# Patient Record
Sex: MALE | Race: WHITE | Employment: FULL TIME | URBAN - NONMETROPOLITAN AREA
[De-identification: names, ages, dates, MRNs, and addresses within clinical notes are randomized per-mention and may not be internally consistent; named-entity substitution may affect disease eponyms.]

---

## 2017-01-05 ENCOUNTER — HOSPITAL ENCOUNTER (INPATIENT)
Age: 52
LOS: 4 days | Discharge: HOME | DRG: 885 | End: 2017-01-09
Attending: EMERGENCY MEDICINE | Admitting: PSYCHIATRY & NEUROLOGY
Payer: COMMERCIAL

## 2017-01-05 DIAGNOSIS — R45.851 DEPRESSION WITH SUICIDAL IDEATION: Primary | ICD-10-CM

## 2017-01-05 DIAGNOSIS — F32.A DEPRESSION WITH SUICIDAL IDEATION: Primary | ICD-10-CM

## 2017-01-05 LAB
ACETAMINOPHEN LEVEL: <15 UG/ML
ALBUMIN SERPL-MCNC: 5.2 G/DL (ref 3.5–5.2)
ALP BLD-CCNC: 108 U/L (ref 40–130)
ALT SERPL-CCNC: 14 U/L (ref 5–41)
AMPHETAMINE SCREEN, URINE: NEGATIVE
ANION GAP SERPL CALCULATED.3IONS-SCNC: 16 MMOL/L (ref 7–19)
AST SERPL-CCNC: 20 U/L (ref 5–40)
BARBITURATE SCREEN URINE: NEGATIVE
BENZODIAZEPINE SCREEN, URINE: POSITIVE
BILIRUB SERPL-MCNC: 0.3 MG/DL (ref 0.2–1.2)
BILIRUBIN URINE: NEGATIVE
BLOOD, URINE: NEGATIVE
BUN BLDV-MCNC: 11 MG/DL (ref 6–20)
CALCIUM SERPL-MCNC: 10.3 MG/DL (ref 8.6–10)
CANNABINOID SCREEN URINE: NEGATIVE
CHLORIDE BLD-SCNC: 97 MMOL/L (ref 98–111)
CLARITY: ABNORMAL
CO2: 30 MMOL/L (ref 22–29)
COCAINE METABOLITE SCREEN URINE: NEGATIVE
COLOR: YELLOW
CREAT SERPL-MCNC: 1 MG/DL (ref 0.5–1.2)
ETHANOL: <10 MG/DL (ref 0–0.08)
GFR NON-AFRICAN AMERICAN: >60
GLOBULIN: 2.4 G/DL
GLUCOSE BLD-MCNC: 102 MG/DL (ref 74–109)
GLUCOSE URINE: NEGATIVE MG/DL
HCT VFR BLD CALC: 46.3 % (ref 42–52)
HEMOGLOBIN: 15.6 G/DL (ref 14–18)
KETONES, URINE: 15 MG/DL
LEUKOCYTE ESTERASE, URINE: NEGATIVE
Lab: ABNORMAL
MCH RBC QN AUTO: 30.8 PG (ref 27–31)
MCHC RBC AUTO-ENTMCNC: 33.7 G/DL (ref 33–37)
MCV RBC AUTO: 91.3 FL (ref 80–94)
NITRITE, URINE: NEGATIVE
OPIATE SCREEN URINE: NEGATIVE
PDW BLD-RTO: 13.2 % (ref 11.5–14.5)
PH UA: 7
PLATELET # BLD: 223 K/UL (ref 130–400)
PMV BLD AUTO: 11.4 FL (ref 7.4–10.4)
POTASSIUM SERPL-SCNC: 4.3 MMOL/L (ref 3.5–5)
PROTEIN UA: NEGATIVE MG/DL
RBC # BLD: 5.07 M/UL (ref 4.7–6.1)
SALICYLATE, SERUM: <3 MG/DL (ref 3–10)
SODIUM BLD-SCNC: 143 MMOL/L (ref 136–145)
SPECIFIC GRAVITY UA: 1.02
TOTAL PROTEIN: 7.6 G/DL (ref 6.6–8.7)
UROBILINOGEN, URINE: 0.2 E.U./DL
WBC # BLD: 7.8 K/UL (ref 4.8–10.8)

## 2017-01-05 PROCEDURE — G0480 DRUG TEST DEF 1-7 CLASSES: HCPCS

## 2017-01-05 PROCEDURE — 85027 COMPLETE CBC AUTOMATED: CPT

## 2017-01-05 PROCEDURE — 1240000000 HC EMOTIONAL WELLNESS R&B

## 2017-01-05 PROCEDURE — 36415 COLL VENOUS BLD VENIPUNCTURE: CPT

## 2017-01-05 PROCEDURE — 80307 DRUG TEST PRSMV CHEM ANLYZR: CPT

## 2017-01-05 PROCEDURE — 81003 URINALYSIS AUTO W/O SCOPE: CPT

## 2017-01-05 PROCEDURE — 6370000000 HC RX 637 (ALT 250 FOR IP): Performed by: PSYCHIATRY & NEUROLOGY

## 2017-01-05 PROCEDURE — 99284 EMERGENCY DEPT VISIT MOD MDM: CPT | Performed by: EMERGENCY MEDICINE

## 2017-01-05 PROCEDURE — 99285 EMERGENCY DEPT VISIT HI MDM: CPT

## 2017-01-05 PROCEDURE — 80053 COMPREHEN METABOLIC PANEL: CPT

## 2017-01-05 RX ORDER — MAGNESIUM HYDROXIDE/ALUMINUM HYDROXICE/SIMETHICONE 120; 1200; 1200 MG/30ML; MG/30ML; MG/30ML
30 SUSPENSION ORAL PRN
Status: DISCONTINUED | OUTPATIENT
Start: 2017-01-05 | End: 2017-01-09 | Stop reason: HOSPADM

## 2017-01-05 RX ORDER — DIAZEPAM 10 MG/1
10 TABLET ORAL DAILY
Status: ON HOLD | COMMUNITY
End: 2017-01-09 | Stop reason: HOSPADM

## 2017-01-05 RX ORDER — INDOMETHACIN 50 MG/1
50 CAPSULE ORAL 2 TIMES DAILY WITH MEALS
Status: ON HOLD | COMMUNITY
End: 2017-01-09 | Stop reason: HOSPADM

## 2017-01-05 RX ORDER — DIAZEPAM 5 MG/1
10 TABLET ORAL EVERY 6 HOURS PRN
Status: DISCONTINUED | OUTPATIENT
Start: 2017-01-05 | End: 2017-01-06

## 2017-01-05 RX ORDER — RABEPRAZOLE SODIUM 20 MG/1
20 TABLET, DELAYED RELEASE ORAL DAILY
Status: ON HOLD | COMMUNITY
End: 2017-01-09 | Stop reason: HOSPADM

## 2017-01-05 RX ORDER — IBUPROFEN 400 MG/1
400 TABLET ORAL EVERY 6 HOURS PRN
Status: DISCONTINUED | OUTPATIENT
Start: 2017-01-05 | End: 2017-01-06

## 2017-01-05 RX ORDER — ACETAMINOPHEN 325 MG/1
650 TABLET ORAL EVERY 4 HOURS PRN
Status: DISCONTINUED | OUTPATIENT
Start: 2017-01-05 | End: 2017-01-09 | Stop reason: HOSPADM

## 2017-01-05 RX ORDER — PANTOPRAZOLE SODIUM 40 MG/1
40 TABLET, DELAYED RELEASE ORAL
Status: DISCONTINUED | OUTPATIENT
Start: 2017-01-06 | End: 2017-01-09 | Stop reason: HOSPADM

## 2017-01-05 RX ORDER — LOSARTAN POTASSIUM 25 MG/1
25 TABLET ORAL DAILY
Status: DISCONTINUED | OUTPATIENT
Start: 2017-01-05 | End: 2017-01-09 | Stop reason: HOSPADM

## 2017-01-05 RX ORDER — TRAZODONE HYDROCHLORIDE 50 MG/1
50 TABLET ORAL NIGHTLY PRN
Status: DISCONTINUED | OUTPATIENT
Start: 2017-01-05 | End: 2017-01-09 | Stop reason: HOSPADM

## 2017-01-05 RX ORDER — INDOMETHACIN 25 MG/1
50 CAPSULE ORAL 2 TIMES DAILY WITH MEALS
Status: DISCONTINUED | OUTPATIENT
Start: 2017-01-05 | End: 2017-01-09 | Stop reason: HOSPADM

## 2017-01-05 RX ADMIN — DIAZEPAM 10 MG: 5 TABLET ORAL at 20:28

## 2017-01-06 PROBLEM — F31.81 BIPOLAR 2 DISORDER, MAJOR DEPRESSIVE EPISODE (HCC): Status: ACTIVE | Noted: 2017-01-06

## 2017-01-06 PROCEDURE — 6370000000 HC RX 637 (ALT 250 FOR IP): Performed by: PSYCHIATRY & NEUROLOGY

## 2017-01-06 PROCEDURE — 90791 PSYCH DIAGNOSTIC EVALUATION: CPT | Performed by: PSYCHIATRY & NEUROLOGY

## 2017-01-06 PROCEDURE — 1240000000 HC EMOTIONAL WELLNESS R&B

## 2017-01-06 RX ORDER — LAMOTRIGINE 25 MG/1
25 TABLET ORAL 2 TIMES DAILY
Status: DISCONTINUED | OUTPATIENT
Start: 2017-01-06 | End: 2017-01-09 | Stop reason: HOSPADM

## 2017-01-06 RX ORDER — NICOTINE 21 MG/24HR
1 PATCH, TRANSDERMAL 24 HOURS TRANSDERMAL DAILY
Status: DISCONTINUED | OUTPATIENT
Start: 2017-01-06 | End: 2017-01-09 | Stop reason: HOSPADM

## 2017-01-06 RX ORDER — LORAZEPAM 0.5 MG/1
0.25 TABLET ORAL 4 TIMES DAILY
Status: DISCONTINUED | OUTPATIENT
Start: 2017-01-06 | End: 2017-01-09

## 2017-01-06 RX ADMIN — LORAZEPAM 0.25 MG: 0.5 TABLET ORAL at 16:41

## 2017-01-06 RX ADMIN — TRAZODONE HYDROCHLORIDE 50 MG: 50 TABLET ORAL at 21:07

## 2017-01-06 RX ADMIN — LORAZEPAM 0.25 MG: 0.5 TABLET ORAL at 21:08

## 2017-01-06 RX ADMIN — PANTOPRAZOLE SODIUM 40 MG: 40 TABLET, DELAYED RELEASE ORAL at 06:21

## 2017-01-06 RX ADMIN — LOSARTAN POTASSIUM 25 MG: 25 TABLET ORAL at 09:39

## 2017-01-06 RX ADMIN — INDOMETHACIN 50 MG: 25 CAPSULE ORAL at 16:40

## 2017-01-06 RX ADMIN — LORAZEPAM 0.25 MG: 0.5 TABLET ORAL at 13:21

## 2017-01-06 RX ADMIN — LAMOTRIGINE 25 MG: 25 TABLET ORAL at 21:08

## 2017-01-06 RX ADMIN — LAMOTRIGINE 25 MG: 25 TABLET ORAL at 13:21

## 2017-01-06 ASSESSMENT — SLEEP AND FATIGUE QUESTIONNAIRES
AVERAGE NUMBER OF SLEEP HOURS: 4
DO YOU HAVE DIFFICULTY SLEEPING: COMMENT

## 2017-01-06 ASSESSMENT — LIFESTYLE VARIABLES: HISTORY_ALCOHOL_USE: NO

## 2017-01-06 ASSESSMENT — PATIENT HEALTH QUESTIONNAIRE - PHQ9: SUM OF ALL RESPONSES TO PHQ QUESTIONS 1-9: 18

## 2017-01-06 ASSESSMENT — PAIN SCALES - GENERAL
PAINLEVEL_OUTOF10: 4
PAINLEVEL_OUTOF10: 0

## 2017-01-07 LAB
TSH SERPL DL<=0.05 MIU/L-ACNC: 2.54 UIU/ML (ref 0.27–4.2)
VITAMIN B-12: 657 PG/ML (ref 211–946)
VITAMIN D 25-HYDROXY: 40.4 NG/ML

## 2017-01-07 PROCEDURE — 82607 VITAMIN B-12: CPT

## 2017-01-07 PROCEDURE — 6370000000 HC RX 637 (ALT 250 FOR IP): Performed by: PSYCHIATRY & NEUROLOGY

## 2017-01-07 PROCEDURE — 99233 SBSQ HOSP IP/OBS HIGH 50: CPT | Performed by: PSYCHIATRY & NEUROLOGY

## 2017-01-07 PROCEDURE — 36415 COLL VENOUS BLD VENIPUNCTURE: CPT

## 2017-01-07 PROCEDURE — 84443 ASSAY THYROID STIM HORMONE: CPT

## 2017-01-07 PROCEDURE — 1240000000 HC EMOTIONAL WELLNESS R&B

## 2017-01-07 PROCEDURE — 82306 VITAMIN D 25 HYDROXY: CPT

## 2017-01-07 RX ADMIN — MAGNESIUM HYDROXIDE 30 ML: 400 SUSPENSION ORAL at 21:27

## 2017-01-07 RX ADMIN — INDOMETHACIN 50 MG: 25 CAPSULE ORAL at 16:05

## 2017-01-07 RX ADMIN — LORAZEPAM 0.25 MG: 0.5 TABLET ORAL at 08:42

## 2017-01-07 RX ADMIN — LORAZEPAM 0.25 MG: 0.5 TABLET ORAL at 16:06

## 2017-01-07 RX ADMIN — TRAZODONE HYDROCHLORIDE 50 MG: 50 TABLET ORAL at 21:06

## 2017-01-07 RX ADMIN — LORAZEPAM 0.25 MG: 0.5 TABLET ORAL at 12:18

## 2017-01-07 RX ADMIN — LORAZEPAM 0.25 MG: 0.5 TABLET ORAL at 21:05

## 2017-01-07 RX ADMIN — PANTOPRAZOLE SODIUM 40 MG: 40 TABLET, DELAYED RELEASE ORAL at 06:13

## 2017-01-07 RX ADMIN — INDOMETHACIN 50 MG: 25 CAPSULE ORAL at 08:42

## 2017-01-07 RX ADMIN — LAMOTRIGINE 25 MG: 25 TABLET ORAL at 21:05

## 2017-01-07 RX ADMIN — LAMOTRIGINE 25 MG: 25 TABLET ORAL at 08:45

## 2017-01-07 ASSESSMENT — PAIN SCALES - GENERAL
PAINLEVEL_OUTOF10: 3
PAINLEVEL_OUTOF10: 4

## 2017-01-08 PROCEDURE — 6370000000 HC RX 637 (ALT 250 FOR IP): Performed by: PSYCHIATRY & NEUROLOGY

## 2017-01-08 PROCEDURE — 1240000000 HC EMOTIONAL WELLNESS R&B

## 2017-01-08 RX ADMIN — LORAZEPAM 0.25 MG: 0.5 TABLET ORAL at 14:34

## 2017-01-08 RX ADMIN — INDOMETHACIN 50 MG: 25 CAPSULE ORAL at 08:38

## 2017-01-08 RX ADMIN — TRAZODONE HYDROCHLORIDE 50 MG: 50 TABLET ORAL at 21:06

## 2017-01-08 RX ADMIN — MAGNESIUM HYDROXIDE 30 ML: 400 SUSPENSION ORAL at 20:09

## 2017-01-08 RX ADMIN — PANTOPRAZOLE SODIUM 40 MG: 40 TABLET, DELAYED RELEASE ORAL at 06:41

## 2017-01-08 RX ADMIN — LORAZEPAM 0.25 MG: 0.5 TABLET ORAL at 08:38

## 2017-01-08 RX ADMIN — LORAZEPAM 0.25 MG: 0.5 TABLET ORAL at 16:54

## 2017-01-08 RX ADMIN — LORAZEPAM 0.25 MG: 0.5 TABLET ORAL at 21:06

## 2017-01-08 RX ADMIN — LAMOTRIGINE 25 MG: 25 TABLET ORAL at 08:38

## 2017-01-08 RX ADMIN — LAMOTRIGINE 25 MG: 25 TABLET ORAL at 21:06

## 2017-01-08 RX ADMIN — INDOMETHACIN 50 MG: 25 CAPSULE ORAL at 16:53

## 2017-01-08 RX ADMIN — LOSARTAN POTASSIUM 25 MG: 25 TABLET ORAL at 08:38

## 2017-01-08 ASSESSMENT — PAIN SCALES - GENERAL
PAINLEVEL_OUTOF10: 3
PAINLEVEL_OUTOF10: 3

## 2017-01-09 VITALS
OXYGEN SATURATION: 100 % | DIASTOLIC BLOOD PRESSURE: 88 MMHG | TEMPERATURE: 98.2 F | RESPIRATION RATE: 20 BRPM | BODY MASS INDEX: 18.78 KG/M2 | HEIGHT: 76 IN | SYSTOLIC BLOOD PRESSURE: 145 MMHG | HEART RATE: 59 BPM | WEIGHT: 154.25 LBS

## 2017-01-09 PROCEDURE — 6370000000 HC RX 637 (ALT 250 FOR IP): Performed by: PSYCHIATRY & NEUROLOGY

## 2017-01-09 PROCEDURE — 99239 HOSP IP/OBS DSCHRG MGMT >30: CPT | Performed by: PSYCHIATRY & NEUROLOGY

## 2017-01-09 PROCEDURE — 5130000000 HC BRIDGE APPOINTMENT

## 2017-01-09 RX ORDER — NICOTINE 21 MG/24HR
1 PATCH, TRANSDERMAL 24 HOURS TRANSDERMAL DAILY
Qty: 30 PATCH | Refills: 1 | Status: SHIPPED | OUTPATIENT
Start: 2017-01-09 | End: 2017-03-03 | Stop reason: SDUPTHER

## 2017-01-09 RX ORDER — INDOMETHACIN 50 MG/1
50 CAPSULE ORAL 2 TIMES DAILY WITH MEALS
Qty: 60 CAPSULE | Refills: 3 | Status: SHIPPED | OUTPATIENT
Start: 2017-01-09 | End: 2017-06-17 | Stop reason: ALTCHOICE

## 2017-01-09 RX ORDER — TRAZODONE HYDROCHLORIDE 50 MG/1
50 TABLET ORAL NIGHTLY PRN
Qty: 30 TABLET | Refills: 1 | Status: SHIPPED | OUTPATIENT
Start: 2017-01-09 | End: 2017-05-01 | Stop reason: SDUPTHER

## 2017-01-09 RX ORDER — LORAZEPAM 0.5 MG/1
0.5 TABLET ORAL 4 TIMES DAILY
Status: DISCONTINUED | OUTPATIENT
Start: 2017-01-09 | End: 2017-01-09 | Stop reason: HOSPADM

## 2017-01-09 RX ORDER — PANTOPRAZOLE SODIUM 40 MG/1
40 TABLET, DELAYED RELEASE ORAL
Qty: 30 TABLET | Refills: 3 | Status: SHIPPED | OUTPATIENT
Start: 2017-01-09 | End: 2017-06-17 | Stop reason: ALTCHOICE

## 2017-01-09 RX ORDER — LOSARTAN POTASSIUM 25 MG/1
25 TABLET ORAL DAILY
Qty: 30 TABLET | Refills: 1 | Status: SHIPPED | OUTPATIENT
Start: 2017-01-09 | End: 2017-03-03 | Stop reason: SDUPTHER

## 2017-01-09 RX ORDER — ACETAMINOPHEN 325 MG/1
650 TABLET ORAL EVERY 4 HOURS PRN
Qty: 120 TABLET | Refills: 3 | Status: SHIPPED | OUTPATIENT
Start: 2017-01-09 | End: 2017-06-17 | Stop reason: ALTCHOICE

## 2017-01-09 RX ORDER — LAMOTRIGINE 25 MG/1
50 TABLET ORAL 2 TIMES DAILY
Qty: 120 TABLET | Refills: 1 | Status: SHIPPED | OUTPATIENT
Start: 2017-01-09 | End: 2017-03-03 | Stop reason: SDUPTHER

## 2017-01-09 RX ORDER — LORAZEPAM 0.5 MG/1
0.5 TABLET ORAL 4 TIMES DAILY
Qty: 120 TABLET | Refills: 1 | Status: SHIPPED | OUTPATIENT
Start: 2017-01-09 | End: 2017-02-08

## 2017-01-09 RX ORDER — MAGNESIUM HYDROXIDE/ALUMINUM HYDROXICE/SIMETHICONE 120; 1200; 1200 MG/30ML; MG/30ML; MG/30ML
30 SUSPENSION ORAL PRN
Qty: 1 BOTTLE | Refills: 0 | Status: SHIPPED | OUTPATIENT
Start: 2017-01-09 | End: 2017-06-17 | Stop reason: ALTCHOICE

## 2017-01-09 RX ADMIN — LOSARTAN POTASSIUM 25 MG: 25 TABLET ORAL at 13:45

## 2017-01-09 RX ADMIN — PANTOPRAZOLE SODIUM 40 MG: 40 TABLET, DELAYED RELEASE ORAL at 06:39

## 2017-01-09 RX ADMIN — LORAZEPAM 0.25 MG: 0.5 TABLET ORAL at 13:46

## 2017-01-09 RX ADMIN — INDOMETHACIN 50 MG: 25 CAPSULE ORAL at 09:05

## 2017-01-09 RX ADMIN — LORAZEPAM 0.25 MG: 0.5 TABLET ORAL at 09:06

## 2017-01-09 RX ADMIN — LAMOTRIGINE 25 MG: 25 TABLET ORAL at 09:06

## 2017-01-09 ASSESSMENT — PAIN SCALES - GENERAL: PAINLEVEL_OUTOF10: 3

## 2017-01-23 ENCOUNTER — OFFICE VISIT (OUTPATIENT)
Dept: PSYCHIATRY | Age: 52
End: 2017-01-23
Payer: COMMERCIAL

## 2017-01-23 VITALS
DIASTOLIC BLOOD PRESSURE: 76 MMHG | OXYGEN SATURATION: 98 % | SYSTOLIC BLOOD PRESSURE: 119 MMHG | HEART RATE: 75 BPM | HEIGHT: 76 IN | WEIGHT: 167.4 LBS | BODY MASS INDEX: 20.38 KG/M2

## 2017-01-23 DIAGNOSIS — F31.81 BIPOLAR 2 DISORDER, MAJOR DEPRESSIVE EPISODE (HCC): Primary | ICD-10-CM

## 2017-01-23 PROCEDURE — 90833 PSYTX W PT W E/M 30 MIN: CPT | Performed by: PSYCHIATRY & NEUROLOGY

## 2017-01-23 PROCEDURE — 99214 OFFICE O/P EST MOD 30 MIN: CPT | Performed by: PSYCHIATRY & NEUROLOGY

## 2017-01-23 RX ORDER — LORAZEPAM 0.5 MG/1
0.5 TABLET ORAL 4 TIMES DAILY
Qty: 120 TABLET | Refills: 0 | Status: SHIPPED | OUTPATIENT
Start: 2017-01-23 | End: 2017-02-22

## 2017-01-23 RX ORDER — LAMOTRIGINE 25 MG/1
75 TABLET ORAL 2 TIMES DAILY
Qty: 180 TABLET | Refills: 0 | Status: SHIPPED | OUTPATIENT
Start: 2017-01-23 | End: 2017-02-26 | Stop reason: SDUPTHER

## 2017-03-06 RX ORDER — LAMOTRIGINE 25 MG/1
50 TABLET ORAL 2 TIMES DAILY
Qty: 120 TABLET | Refills: 1 | Status: SHIPPED | OUTPATIENT
Start: 2017-03-06 | End: 2017-05-01 | Stop reason: SDUPTHER

## 2017-03-06 RX ORDER — LOSARTAN POTASSIUM 25 MG/1
25 TABLET ORAL DAILY
Qty: 30 TABLET | Refills: 1 | Status: SHIPPED | OUTPATIENT
Start: 2017-03-06 | End: 2017-05-01 | Stop reason: SDUPTHER

## 2017-03-06 RX ORDER — NICOTINE 21 MG/24HR
1 PATCH, TRANSDERMAL 24 HOURS TRANSDERMAL DAILY
Qty: 30 PATCH | Refills: 1 | Status: SHIPPED | OUTPATIENT
Start: 2017-03-06 | End: 2017-05-01 | Stop reason: SDUPTHER

## 2017-03-08 ENCOUNTER — OFFICE VISIT (OUTPATIENT)
Dept: PSYCHIATRY | Age: 52
End: 2017-03-08
Payer: COMMERCIAL

## 2017-03-08 VITALS
BODY MASS INDEX: 21.38 KG/M2 | WEIGHT: 175.6 LBS | OXYGEN SATURATION: 100 % | SYSTOLIC BLOOD PRESSURE: 128 MMHG | HEART RATE: 59 BPM | HEIGHT: 76 IN | DIASTOLIC BLOOD PRESSURE: 79 MMHG

## 2017-03-08 DIAGNOSIS — F31.81 BIPOLAR 2 DISORDER, MAJOR DEPRESSIVE EPISODE (HCC): Primary | ICD-10-CM

## 2017-03-08 PROCEDURE — 99214 OFFICE O/P EST MOD 30 MIN: CPT | Performed by: PSYCHIATRY & NEUROLOGY

## 2017-03-08 RX ORDER — LORAZEPAM 0.5 MG/1
0.5 TABLET ORAL EVERY 8 HOURS PRN
Qty: 90 TABLET | Refills: 1 | Status: SHIPPED | OUTPATIENT
Start: 2017-03-08 | End: 2017-04-07

## 2017-03-08 RX ORDER — LAMOTRIGINE 150 MG/1
150 TABLET ORAL DAILY
Qty: 30 TABLET | Refills: 3 | Status: SHIPPED | OUTPATIENT
Start: 2017-03-08 | End: 2017-06-28 | Stop reason: SDUPTHER

## 2017-03-08 RX ORDER — LOSARTAN POTASSIUM AND HYDROCHLOROTHIAZIDE 12.5; 5 MG/1; MG/1
TABLET ORAL DAILY
COMMUNITY
Start: 2016-12-19 | End: 2018-03-03 | Stop reason: SDUPTHER

## 2017-03-08 RX ORDER — BUPROPION HYDROCHLORIDE 100 MG/1
100 TABLET, EXTENDED RELEASE ORAL DAILY
Qty: 30 TABLET | Refills: 1 | Status: SHIPPED | OUTPATIENT
Start: 2017-03-08 | End: 2017-05-01 | Stop reason: SDUPTHER

## 2017-05-01 ENCOUNTER — OFFICE VISIT (OUTPATIENT)
Dept: PSYCHIATRY | Age: 52
End: 2017-05-01
Payer: COMMERCIAL

## 2017-05-01 VITALS
SYSTOLIC BLOOD PRESSURE: 136 MMHG | OXYGEN SATURATION: 100 % | WEIGHT: 177.4 LBS | HEART RATE: 54 BPM | BODY MASS INDEX: 24.03 KG/M2 | HEIGHT: 72 IN | DIASTOLIC BLOOD PRESSURE: 84 MMHG

## 2017-05-01 DIAGNOSIS — F31.81 BIPOLAR 2 DISORDER, MAJOR DEPRESSIVE EPISODE (HCC): ICD-10-CM

## 2017-05-01 DIAGNOSIS — F31.81 BIPOLAR 2 DISORDER, MAJOR DEPRESSIVE EPISODE (HCC): Primary | ICD-10-CM

## 2017-05-01 PROCEDURE — 99213 OFFICE O/P EST LOW 20 MIN: CPT | Performed by: PSYCHIATRY & NEUROLOGY

## 2017-05-01 PROCEDURE — 90833 PSYTX W PT W E/M 30 MIN: CPT | Performed by: PSYCHIATRY & NEUROLOGY

## 2017-05-01 RX ORDER — LAMOTRIGINE 25 MG/1
50 TABLET ORAL 2 TIMES DAILY
Qty: 120 TABLET | Refills: 1 | Status: SHIPPED | OUTPATIENT
Start: 2017-05-01 | End: 2017-06-17 | Stop reason: ALTCHOICE

## 2017-05-01 RX ORDER — NICOTINE 21 MG/24HR
1 PATCH, TRANSDERMAL 24 HOURS TRANSDERMAL DAILY
Qty: 30 PATCH | Refills: 1 | Status: SHIPPED | OUTPATIENT
Start: 2017-05-01 | End: 2017-07-02 | Stop reason: SDUPTHER

## 2017-05-01 RX ORDER — TRAZODONE HYDROCHLORIDE 50 MG/1
50 TABLET ORAL NIGHTLY PRN
Qty: 30 TABLET | Refills: 1 | Status: SHIPPED | OUTPATIENT
Start: 2017-05-01 | End: 2017-06-17 | Stop reason: ALTCHOICE

## 2017-05-01 RX ORDER — LOSARTAN POTASSIUM 25 MG/1
25 TABLET ORAL DAILY
Qty: 30 TABLET | Refills: 1 | Status: SHIPPED | OUTPATIENT
Start: 2017-05-01 | End: 2017-06-17 | Stop reason: ALTCHOICE

## 2017-05-01 RX ORDER — LORAZEPAM 0.5 MG/1
0.5 TABLET ORAL EVERY 8 HOURS PRN
Qty: 90 TABLET | Refills: 1 | Status: SHIPPED | OUTPATIENT
Start: 2017-05-01 | End: 2017-05-31

## 2017-05-01 RX ORDER — BUPROPION HYDROCHLORIDE 100 MG/1
TABLET, EXTENDED RELEASE ORAL
Qty: 30 TABLET | Refills: 0 | Status: SHIPPED | OUTPATIENT
Start: 2017-05-01 | End: 2017-05-25 | Stop reason: SDUPTHER

## 2017-05-25 DIAGNOSIS — F31.81 BIPOLAR 2 DISORDER, MAJOR DEPRESSIVE EPISODE (HCC): ICD-10-CM

## 2017-05-25 RX ORDER — BUPROPION HYDROCHLORIDE 100 MG/1
TABLET, EXTENDED RELEASE ORAL
Qty: 30 TABLET | Refills: 0 | Status: SHIPPED | OUTPATIENT
Start: 2017-05-25 | End: 2017-06-27 | Stop reason: SDUPTHER

## 2017-06-17 ENCOUNTER — OFFICE VISIT (OUTPATIENT)
Dept: URGENT CARE | Age: 52
End: 2017-06-17
Payer: COMMERCIAL

## 2017-06-17 VITALS
OXYGEN SATURATION: 98 % | TEMPERATURE: 98.5 F | HEIGHT: 76 IN | BODY MASS INDEX: 22.16 KG/M2 | WEIGHT: 182 LBS | SYSTOLIC BLOOD PRESSURE: 124 MMHG | RESPIRATION RATE: 20 BRPM | HEART RATE: 62 BPM | DIASTOLIC BLOOD PRESSURE: 72 MMHG

## 2017-06-17 DIAGNOSIS — T15.91XA FOREIGN BODY, EYE, RIGHT, INITIAL ENCOUNTER: Primary | ICD-10-CM

## 2017-06-17 PROCEDURE — 90471 IMMUNIZATION ADMIN: CPT | Performed by: NURSE PRACTITIONER

## 2017-06-17 PROCEDURE — 90714 TD VACC NO PRESV 7 YRS+ IM: CPT | Performed by: NURSE PRACTITIONER

## 2017-06-17 PROCEDURE — 99213 OFFICE O/P EST LOW 20 MIN: CPT | Performed by: NURSE PRACTITIONER

## 2017-06-17 RX ORDER — LORAZEPAM 0.5 MG/1
TABLET ORAL
Refills: 1 | COMMUNITY
Start: 2017-06-07 | End: 2017-08-02 | Stop reason: SDUPTHER

## 2017-06-17 RX ORDER — SUMATRIPTAN 100 MG/1
100 TABLET, FILM COATED ORAL PRN
Refills: 3 | COMMUNITY
Start: 2017-05-09 | End: 2019-09-27 | Stop reason: SDUPTHER

## 2017-06-17 RX ORDER — TOBRAMYCIN 3 MG/ML
1 SOLUTION/ DROPS OPHTHALMIC EVERY 4 HOURS
Qty: 1 BOTTLE | Refills: 0 | Status: SHIPPED | OUTPATIENT
Start: 2017-06-17 | End: 2017-06-24

## 2017-06-17 RX ORDER — RABEPRAZOLE SODIUM 20 MG/1
20 TABLET, DELAYED RELEASE ORAL DAILY
COMMUNITY
Start: 2017-04-23 | End: 2017-11-09 | Stop reason: ALTCHOICE

## 2017-06-17 ASSESSMENT — ENCOUNTER SYMPTOMS
BLURRED VISION: 0
SORE THROAT: 0
EYE PAIN: 1
EYE REDNESS: 1
PHOTOPHOBIA: 0
DOUBLE VISION: 0
NAUSEA: 0
FOREIGN BODY SENSATION: 1
VOMITING: 0
EYE DISCHARGE: 0

## 2017-06-27 DIAGNOSIS — F31.81 BIPOLAR 2 DISORDER, MAJOR DEPRESSIVE EPISODE (HCC): ICD-10-CM

## 2017-06-27 RX ORDER — BUPROPION HYDROCHLORIDE 100 MG/1
TABLET, EXTENDED RELEASE ORAL
Qty: 30 TABLET | Refills: 3 | Status: SHIPPED | OUTPATIENT
Start: 2017-06-27 | End: 2017-10-29 | Stop reason: SDUPTHER

## 2017-06-28 DIAGNOSIS — F31.81 BIPOLAR 2 DISORDER, MAJOR DEPRESSIVE EPISODE (HCC): ICD-10-CM

## 2017-06-28 RX ORDER — LAMOTRIGINE 150 MG/1
TABLET ORAL
Qty: 30 TABLET | Refills: 0 | Status: SHIPPED | OUTPATIENT
Start: 2017-06-28 | End: 2017-08-02 | Stop reason: SDUPTHER

## 2017-07-04 RX ORDER — NICOTINE 21 MG/24HR
PATCH, TRANSDERMAL 24 HOURS TRANSDERMAL
Qty: 28 PATCH | Refills: 0 | Status: SHIPPED | OUTPATIENT
Start: 2017-07-04 | End: 2017-11-09 | Stop reason: ALTCHOICE

## 2017-07-27 ENCOUNTER — TELEPHONE (OUTPATIENT)
Dept: PSYCHIATRY | Age: 52
End: 2017-07-27

## 2017-07-27 RX ORDER — LAMOTRIGINE 25 MG/1
TABLET ORAL
COMMUNITY
Start: 2017-06-25 | End: 2017-09-05 | Stop reason: DRUGHIGH

## 2017-08-01 ENCOUNTER — TELEPHONE (OUTPATIENT)
Dept: INTERNAL MEDICINE | Age: 52
End: 2017-08-01

## 2017-08-02 DIAGNOSIS — F31.81 BIPOLAR 2 DISORDER, MAJOR DEPRESSIVE EPISODE (HCC): ICD-10-CM

## 2017-08-02 RX ORDER — LAMOTRIGINE 150 MG/1
TABLET ORAL
Qty: 30 TABLET | Refills: 0 | Status: SHIPPED | OUTPATIENT
Start: 2017-08-02 | End: 2017-09-04 | Stop reason: SDUPTHER

## 2017-08-02 RX ORDER — LORAZEPAM 0.5 MG/1
TABLET ORAL
Qty: 30 TABLET | Refills: 0 | Status: SHIPPED | OUTPATIENT
Start: 2017-08-02 | End: 2017-09-13 | Stop reason: SDUPTHER

## 2017-08-04 ENCOUNTER — TELEPHONE (OUTPATIENT)
Dept: PSYCHIATRY | Age: 52
End: 2017-08-04

## 2017-09-04 DIAGNOSIS — F31.81 BIPOLAR 2 DISORDER, MAJOR DEPRESSIVE EPISODE (HCC): ICD-10-CM

## 2017-09-05 RX ORDER — LAMOTRIGINE 150 MG/1
TABLET ORAL
Qty: 30 TABLET | Refills: 0 | Status: SHIPPED | OUTPATIENT
Start: 2017-09-05 | End: 2017-10-03 | Stop reason: SDUPTHER

## 2017-09-11 ENCOUNTER — TELEPHONE (OUTPATIENT)
Dept: INTERNAL MEDICINE | Age: 52
End: 2017-09-11

## 2017-09-14 RX ORDER — LORAZEPAM 0.5 MG/1
TABLET ORAL
Qty: 90 TABLET | Refills: 0 | Status: SHIPPED | OUTPATIENT
Start: 2017-09-14 | End: 2017-10-10 | Stop reason: SDUPTHER

## 2017-09-15 ENCOUNTER — TELEPHONE (OUTPATIENT)
Dept: PSYCHIATRY | Age: 52
End: 2017-09-15

## 2017-10-03 DIAGNOSIS — F31.81 BIPOLAR 2 DISORDER, MAJOR DEPRESSIVE EPISODE (HCC): ICD-10-CM

## 2017-10-04 RX ORDER — LAMOTRIGINE 150 MG/1
TABLET ORAL
Qty: 30 TABLET | Refills: 0 | Status: SHIPPED | OUTPATIENT
Start: 2017-10-04 | End: 2017-11-02 | Stop reason: SDUPTHER

## 2017-10-04 NOTE — TELEPHONE ENCOUNTER
Pharmacy sent  Last OV 5-1-17 last ov canceled per office  Next OV 10-24-17    Requested Prescriptions     Pending Prescriptions Disp Refills    lamoTRIgine (LAMICTAL) 150 MG tablet [Pharmacy Med Name: LAMOTRIGINE 150MG TABLETS] 30 tablet 0     Sig: TAKE 1 TABLET BY MOUTH EVERY DAY     10/4/2017 8:55 AM   Progress Note        Olman Hurst 1965  Psychotherapy Time Spent: 18 min      Psychotherapy Topics: health    Chief Complaint   Patient presents with    Medication Refill     Follow up appointment. History of cindy and depression  Subjective:      Patient reports side effects as follows: none. Reports NO suicidal ideation. Reports compliance with medications as good . Pt says he can tell that he is feeling better. Says that about two weeks ago he noticed that he was starting to feel like he did before he \"got sick\". Wife is present and she agrees with the patient. No major issues today. Review of Systems - History obtained from chart review and the patient  Psychological ROS: positive for - anxiety  14 point review is negative except for above  Current Meds:    Prior to Admission medications    Medication Sig Start Date End Date Taking?  Authorizing Provider   LORazepam (ATIVAN) 0.5 MG tablet TAKE 1 TABLET BY MOUTH EVERY 8 HOURS AS NEEDED FOR PAIN 9/14/17   GUILLAUME Hirsch   lamoTRIgine (LAMICTAL) 150 MG tablet TAKE 1 TABLET BY MOUTH EVERY DAY 9/5/17   GUILLAUME Hirsch   nicotine (NICODERM CQ) 21 MG/24HR PLACE 1 PATCH ON THE SKIN EVERY DAY REMOVE AT BEDTIME 7/4/17   Leo Bundy DO   buPROPion [facility] SR) 100 MG extended release tablet TAKE 1 TABLET BY MOUTH EVERY DAY 6/27/17   Leo Bundy DO   RABEprazole (ACIPHEX) 20 MG tablet Take 20 mg by mouth daily  4/23/17   Historical Provider, MD   SUMAtriptan (IMITREX) 100 MG tablet Take 100 mg by mouth once as needed  5/9/17   Historical Provider, MD   losartan-hydrochlorothiazide (HYZAAR) 50-12.5 MG per tablet Take by mouth daily

## 2017-10-11 RX ORDER — LORAZEPAM 0.5 MG/1
TABLET ORAL
Qty: 90 TABLET | Refills: 0 | Status: SHIPPED | OUTPATIENT
Start: 2017-10-11 | End: 2017-11-13 | Stop reason: SDUPTHER

## 2017-10-12 NOTE — TELEPHONE ENCOUNTER
Called into [facility]  Ativan 0.5 mg tablet  Left on prescriber vm per  Dr. Aldana Getting
tablet Take by mouth daily 12/19/16   Historical Provider, MD       @    YASH:  Patient is  A & O x3. Appearance:  well-appearing  Cognition:  Recent memory intact , remote memory intact , good fund of knowledge, above average intelligence level. Speech:  normal  Language: Naming: intact; Word Finding: intact  Conversation no evidence of delusions  Behavior:  Cooperative  Mood: anxious  Affect: congruent with mood  Thought Content: negative   Thought Process: goal directed  Judgement Insight:  normal and appropriate  Gait and Station:normal gait and station   Musculoskeletal:no issues    Assesment: Bipolar Type 2, EDUARDO  Plan:  1. The risks, benefits, side effects, indications, contraindications, and adverse effects of the medications have been discussed. 2. The pt has verbalized understanding and has capacity to give informed consent. 3. The Ramonita Bae report has been reviewed according to [facility] regulations. 4. Supportive therapy offered. 5. Individual therapy: options discussed  6. Follow up: 3 months7. The patient has been advised to call with any problems. 8. Controlled substance Treatment Plan: this is a maintenance dose. .  9. The above listed medications have been continued, modifications in meds and other orders/labs as follows: per his PCP   No orders of the defined types were placed in this encounter. No orders of the defined types were placed in this encounter. 10. Additional comments:    ·  Cognitive reframing used as a therapeutic intervention              Cori Godfrey Ed.D.D.O.DFAPA

## 2017-10-23 DIAGNOSIS — Z00.00 GENERAL MEDICAL EXAMINATION: Primary | ICD-10-CM

## 2017-10-29 DIAGNOSIS — F31.81 BIPOLAR 2 DISORDER, MAJOR DEPRESSIVE EPISODE (HCC): ICD-10-CM

## 2017-10-30 RX ORDER — BUPROPION HYDROCHLORIDE 100 MG/1
TABLET, EXTENDED RELEASE ORAL
Qty: 30 TABLET | Refills: 0 | Status: SHIPPED | OUTPATIENT
Start: 2017-10-30 | End: 2017-11-09 | Stop reason: ALTCHOICE

## 2017-11-02 DIAGNOSIS — F31.81 BIPOLAR 2 DISORDER, MAJOR DEPRESSIVE EPISODE (HCC): ICD-10-CM

## 2017-11-02 DIAGNOSIS — Z00.00 GENERAL MEDICAL EXAMINATION: ICD-10-CM

## 2017-11-02 LAB
ALBUMIN SERPL-MCNC: 4.3 G/DL (ref 3.5–5.2)
ALP BLD-CCNC: 81 U/L (ref 40–130)
ALT SERPL-CCNC: 17 U/L (ref 5–41)
AMYLASE: 114 U/L (ref 28–100)
ANION GAP SERPL CALCULATED.3IONS-SCNC: 15 MMOL/L (ref 7–19)
AST SERPL-CCNC: 24 U/L (ref 5–40)
BILIRUB SERPL-MCNC: <0.2 MG/DL (ref 0.2–1.2)
BUN BLDV-MCNC: 15 MG/DL (ref 6–20)
CALCIUM SERPL-MCNC: 9.5 MG/DL (ref 8.6–10)
CHLORIDE BLD-SCNC: 101 MMOL/L (ref 98–111)
CHOLESTEROL, TOTAL: 164 MG/DL (ref 160–199)
CO2: 27 MMOL/L (ref 22–29)
CREAT SERPL-MCNC: 1.1 MG/DL (ref 0.5–1.2)
GFR NON-AFRICAN AMERICAN: >60
GLUCOSE BLD-MCNC: 96 MG/DL (ref 74–109)
HCT VFR BLD CALC: 42 % (ref 42–52)
HDLC SERPL-MCNC: 62 MG/DL (ref 55–121)
HEMOGLOBIN: 14 G/DL (ref 14–18)
LDL CHOLESTEROL CALCULATED: 91 MG/DL
LIPASE: 56 U/L (ref 13–60)
MCH RBC QN AUTO: 30.1 PG (ref 27–31)
MCHC RBC AUTO-ENTMCNC: 33.3 G/DL (ref 33–37)
MCV RBC AUTO: 90.3 FL (ref 80–94)
PDW BLD-RTO: 12.8 % (ref 11.5–14.5)
PLATELET # BLD: 197 K/UL (ref 130–400)
PMV BLD AUTO: 10.7 FL (ref 9.4–12.4)
POTASSIUM SERPL-SCNC: 4.1 MMOL/L (ref 3.5–5)
PROSTATE SPECIFIC ANTIGEN: 1.83 NG/ML (ref 0–4)
RBC # BLD: 4.65 M/UL (ref 4.7–6.1)
SODIUM BLD-SCNC: 143 MMOL/L (ref 136–145)
TOTAL PROTEIN: 6.5 G/DL (ref 6.6–8.7)
TRIGL SERPL-MCNC: 54 MG/DL (ref 0–149)
TSH SERPL DL<=0.05 MIU/L-ACNC: 2.17 UIU/ML (ref 0.27–4.2)
WBC # BLD: 7.5 K/UL (ref 4.8–10.8)

## 2017-11-02 RX ORDER — LAMOTRIGINE 150 MG/1
TABLET ORAL
Qty: 30 TABLET | Refills: 2 | Status: SHIPPED | OUTPATIENT
Start: 2017-11-02 | End: 2017-11-09

## 2017-11-09 ENCOUNTER — OFFICE VISIT (OUTPATIENT)
Dept: INTERNAL MEDICINE | Age: 52
End: 2017-11-09
Payer: COMMERCIAL

## 2017-11-09 VITALS
WEIGHT: 183 LBS | SYSTOLIC BLOOD PRESSURE: 118 MMHG | HEIGHT: 76 IN | BODY MASS INDEX: 22.29 KG/M2 | DIASTOLIC BLOOD PRESSURE: 66 MMHG | RESPIRATION RATE: 18 BRPM | HEART RATE: 61 BPM | OXYGEN SATURATION: 98 %

## 2017-11-09 DIAGNOSIS — Z00.00 ANNUAL PHYSICAL EXAM: Primary | ICD-10-CM

## 2017-11-09 DIAGNOSIS — Z12.11 COLON CANCER SCREENING: ICD-10-CM

## 2017-11-09 DIAGNOSIS — K59.02 CONSTIPATION DUE TO OUTLET DYSFUNCTION: ICD-10-CM

## 2017-11-09 DIAGNOSIS — F31.81 BIPOLAR 2 DISORDER, MAJOR DEPRESSIVE EPISODE (HCC): ICD-10-CM

## 2017-11-09 DIAGNOSIS — M62.89 PELVIC FLOOR DYSFUNCTION: ICD-10-CM

## 2017-11-09 PROCEDURE — 99386 PREV VISIT NEW AGE 40-64: CPT | Performed by: INTERNAL MEDICINE

## 2017-11-09 RX ORDER — LAMOTRIGINE 200 MG/1
200 TABLET ORAL DAILY
Qty: 90 TABLET | Refills: 3 | Status: SHIPPED | OUTPATIENT
Start: 2017-11-09 | End: 2019-11-28 | Stop reason: SDUPTHER

## 2017-11-09 RX ORDER — BUPROPION HYDROCHLORIDE 300 MG/1
100 TABLET ORAL EVERY MORNING
COMMUNITY
End: 2017-11-30 | Stop reason: SDUPTHER

## 2017-11-09 ASSESSMENT — PATIENT HEALTH QUESTIONNAIRE - PHQ9
SUM OF ALL RESPONSES TO PHQ QUESTIONS 1-9: 1
SUM OF ALL RESPONSES TO PHQ9 QUESTIONS 1 & 2: 1
2. FEELING DOWN, DEPRESSED OR HOPELESS: 1
1. LITTLE INTEREST OR PLEASURE IN DOING THINGS: 0

## 2017-11-09 NOTE — PROGRESS NOTES
Chief Complaint   Patient presents with    Annual Exam     Mr. Otf Lopes is here today for his annual wellness exam with complaints of right shoulder pain.  Shoulder Pain       HPI: Patient is here today for annual physical exam and follow-up of bipolar disorder he feels a little more depressed. He is not suicidal and not currently agitated feels like he may be getting a little more down. He's also having right shoulder pain pain with range of motion of his right shoulder he has ongoing issues with pelvic floor pain and difficulty with bowel movements. Is a long-standing problem since 2008. Past Medical History:   Diagnosis Date    Bipolar disorder ([address]     BPH (benign prostatic hypertrophy)     Constipation     Depression     Headache(784.0)     Hypertension     Pelvic floor dysfunction        Past Surgical History:   Procedure Laterality Date    ACHILLES TENDON SURGERY      CHOLECYSTECTOMY      TONSILLECTOMY AND ADENOIDECTOMY         History reviewed. No pertinent family history. Social History     Social History    Marital status:      Spouse name: N/A    Number of children: N/A    Years of education: N/A     Occupational History    Not on file.      Social History Main Topics    Smoking status: Former Smoker    Smokeless tobacco: Never Used    Alcohol use No    Drug use: No    Sexual activity: Not on file     Other Topics Concern    Not on file     Social History Narrative    No narrative on file       No Known Allergies    Current Outpatient Prescriptions   Medication Sig Dispense Refill    buPROPion (WELLBUTRIN XL) 300 MG extended release tablet Take 100 mg by mouth every morning       lamoTRIgine (LAMICTAL) 200 MG tablet Take 1 tablet by mouth daily 90 tablet 3    SUMAtriptan (IMITREX) 100 MG tablet Take 100 mg by mouth once as needed   3    losartan-hydrochlorothiazide (HYZAAR) 50-12.5 MG per tablet Take by mouth daily      LORazepam (ATIVAN) 0.5 MG tablet TAKE 1 TABLET BY MOUTH EVERY 8 HOURS AS NEEDED FOR ANXIETY 90 tablet 0     No current facility-administered medications for this visit. Review of Systems   Constitutional: Negative for chills, fatigue and fever. HENT: Negative for congestion and sinus pressure. Eyes: Negative for discharge and redness. Respiratory: Negative for cough and shortness of breath. Cardiovascular: Negative for chest pain, palpitations and leg swelling. Gastrointestinal: Positive for constipation. Negative for abdominal distention and abdominal pain. Genitourinary: Negative for dysuria, frequency and urgency. Musculoskeletal: Positive for arthralgias. Negative for back pain. Skin: Negative for rash and wound. Neurological: Negative for dizziness, light-headedness and headaches. Psychiatric/Behavioral: Positive for dysphoric mood. Negative for sleep disturbance. The patient is not nervous/anxious. /66 (Site: Right Arm)   Pulse 61   Resp 18   Ht 6' 4\" (1.93 m)   Wt 183 lb (83 kg)   SpO2 98%   BMI 22.28 kg/m²     Physical Exam   Constitutional: He is oriented to person, place, and time. He appears well-developed. No distress. HENT:   Right Ear: External ear normal. Tympanic membrane is not injected. Left Ear: External ear normal. Tympanic membrane is not injected. Mouth/Throat: Oropharynx is clear and moist. No oropharyngeal exudate. Eyes: Conjunctivae are normal. No scleral icterus. Neck: Neck supple. Carotid bruit is not present. No thyroid mass and no thyromegaly present. Cardiovascular: Normal rate, regular rhythm, S1 normal and S2 normal.  Exam reveals no S3 and no S4. No murmur heard. Pulses:       Carotid pulses are 0 on the right side, and 0 on the left side. Pulmonary/Chest: Effort normal and breath sounds normal. No respiratory distress. Abdominal: Soft. Normal appearance and bowel sounds are normal. He exhibits no distension and no mass. There is no hepatosplenomegaly.

## 2017-11-13 RX ORDER — LORAZEPAM 0.5 MG/1
TABLET ORAL
Qty: 90 TABLET | Refills: 0 | Status: SHIPPED | OUTPATIENT
Start: 2017-11-13 | End: 2017-12-10 | Stop reason: SDUPTHER

## 2017-11-13 NOTE — TELEPHONE ENCOUNTER
Called pt Ativan into [facility] per Sreekanth Ramsey NP/ Dr. Maverick Senior. Left message on pharmacist vm.

## 2017-11-13 NOTE — TELEPHONE ENCOUNTER
Pharmacy sent for a refill of the following Rx. Pt was last seen on 05/01/17 and will be scheduled a follow up by office. Requested Prescriptions     Signed Prescriptions Disp Refills    LORazepam (ATIVAN) 0.5 MG tablet 90 tablet 0     Sig: TAKE 1 TABLET BY MOUTH EVERY 8 HOURS AS NEEDED FOR ANXIETY     Authorizing Provider: Alexi Haynes     11/13/2017 3:26 PM   Progress Note        Irvin Villarreal 1965  Psychotherapy Time Spent: 18 min      Psychotherapy Topics: health    Chief Complaint   Patient presents with    Medication Refill     Follow up appointment. History of cindy and depression  Subjective:      Patient reports side effects as follows: none. Reports NO suicidal ideation. Reports compliance with medications as good . Pt says he can tell that he is feeling better. Says that about two weeks ago he noticed that he was starting to feel like he did before he \"got sick\". Wife is present and she agrees with the patient. No major issues today. Review of Systems - History obtained from chart review and the patient  Psychological ROS: positive for - anxiety  14 point review is negative except for above  Current Meds:    Prior to Admission medications    Medication Sig Start Date End Date Taking? Authorizing Provider   LORazepam (ATIVAN) 0.5 MG tablet TAKE 1 TABLET BY MOUTH EVERY 8 HOURS AS NEEDED FOR ANXIETY 11/13/17  Yes Jari Pallas, APRN   buPROPion (WELLBUTRIN XL) 300 MG extended release tablet Take 100 mg by mouth every morning     Historical Provider, MD   lamoTRIgine (LAMICTAL) 200 MG tablet Take 1 tablet by mouth daily 11/9/17   Joe Rucker MD   SUMAtriptan (IMITREX) 100 MG tablet Take 100 mg by mouth once as needed  5/9/17   Historical Provider, MD   losartan-hydrochlorothiazide [facility]) 50-12.5 MG per tablet Take by mouth daily 12/19/16   Historical Provider, MD       @    MSE:  Patient is  A & O x3.   Appearance:  well-appearing  Cognition:  Recent memory intact , remote memory

## 2017-11-21 PROBLEM — Z00.00 ANNUAL PHYSICAL EXAM: Status: ACTIVE | Noted: 2017-11-21

## 2017-11-21 ASSESSMENT — ENCOUNTER SYMPTOMS
CONSTIPATION: 1
COUGH: 0
ABDOMINAL PAIN: 0
EYE REDNESS: 0
SINUS PRESSURE: 0
BACK PAIN: 0
SHORTNESS OF BREATH: 0
ABDOMINAL DISTENTION: 0
EYE DISCHARGE: 0

## 2017-11-30 RX ORDER — BUPROPION HYDROCHLORIDE 300 MG/1
100 TABLET ORAL EVERY MORNING
Qty: 90 TABLET | Refills: 3 | Status: SHIPPED | OUTPATIENT
Start: 2017-11-30 | End: 2017-12-04 | Stop reason: SDUPTHER

## 2017-12-04 RX ORDER — BUPROPION HYDROCHLORIDE 300 MG/1
100 TABLET ORAL EVERY MORNING
Qty: 90 TABLET | Refills: 3 | Status: SHIPPED | OUTPATIENT
Start: 2017-12-04 | End: 2019-12-17

## 2017-12-11 RX ORDER — LORAZEPAM 0.5 MG/1
TABLET ORAL
Qty: 90 TABLET | Refills: 0 | Status: SHIPPED | OUTPATIENT
Start: 2017-12-11

## 2017-12-12 NOTE — TELEPHONE ENCOUNTER
Called pt Ativan into [facility] per Avtar Stewart NP/ Micki Shirley NP. Pt notified.
intact , good fund of knowledge, above average intelligence level. Speech:  normal  Language: Naming: intact; Word Finding: intact  Conversation no evidence of delusions  Behavior:  Cooperative  Mood: anxious  Affect: congruent with mood  Thought Content: negative   Thought Process: goal directed  Judgement Insight:  normal and appropriate  Gait and Station:normal gait and station   Musculoskeletal:no issues    Assesment: Bipolar Type 2, EDUARDO  Plan:  1. The risks, benefits, side effects, indications, contraindications, and adverse effects of the medications have been discussed. 2. The pt has verbalized understanding and has capacity to give informed consent. 3. The [facility] report has been reviewed according to [facility] regulations. 4. Supportive therapy offered. 5. Individual therapy: options discussed  6. Follow up: 3 months7. The patient has been advised to call with any problems. 8. Controlled substance Treatment Plan: this is a maintenance dose. .  9. The above listed medications have been continued, modifications in meds and other orders/labs as follows: per his PCP   No orders of the defined types were placed in this encounter. No orders of the defined types were placed in this encounter. 10. Additional comments:    ·  Cognitive reframing used as a therapeutic intervention              Mohan Godfrey Ed.D.D.O.DFAPA

## 2018-01-02 ENCOUNTER — OFFICE VISIT (OUTPATIENT)
Dept: GASTROENTEROLOGY | Age: 53
End: 2018-01-02
Payer: COMMERCIAL

## 2018-01-02 VITALS
HEIGHT: 76 IN | HEART RATE: 73 BPM | BODY MASS INDEX: 22.19 KG/M2 | SYSTOLIC BLOOD PRESSURE: 140 MMHG | DIASTOLIC BLOOD PRESSURE: 82 MMHG | WEIGHT: 182.2 LBS | OXYGEN SATURATION: 98 %

## 2018-01-02 DIAGNOSIS — Z86.010 HISTORY OF COLON POLYPS: Primary | ICD-10-CM

## 2018-01-02 DIAGNOSIS — M62.89 PELVIC FLOOR DYSFUNCTION: ICD-10-CM

## 2018-01-02 PROBLEM — Z86.0100 HISTORY OF COLON POLYPS: Status: ACTIVE | Noted: 2018-01-02

## 2018-01-02 PROCEDURE — 99214 OFFICE O/P EST MOD 30 MIN: CPT | Performed by: NURSE PRACTITIONER

## 2018-01-02 RX ORDER — RABEPRAZOLE SODIUM 20 MG/1
20 TABLET, DELAYED RELEASE ORAL DAILY
COMMUNITY
End: 2018-01-14 | Stop reason: SDUPTHER

## 2018-01-02 ASSESSMENT — ENCOUNTER SYMPTOMS
VOMITING: 0
CHEST TIGHTNESS: 0
BACK PAIN: 0
COUGH: 0
VOICE CHANGE: 0
DIARRHEA: 0
BLOOD IN STOOL: 0
SHORTNESS OF BREATH: 0
ABDOMINAL DISTENTION: 0
NAUSEA: 0
RECTAL PAIN: 0
ABDOMINAL PAIN: 0
CONSTIPATION: 0
SORE THROAT: 0

## 2018-01-02 NOTE — PROGRESS NOTES
mood and affect. His behavior is normal. Thought content normal. Cognition and memory are normal.       Assessment:      1. History of colon polyps    2. Pelvic floor dysfunction            Plan:      Schedule colonoscopy  screening    Instruct on bowel prep. Nothing to eat or drink after midnight the day of the exam.  Unable to drive for 24 hours after the procedure. No aspirin or nonsteroidal anti-inflammatories for 5 days before procedure. Risks, benefits and alternatives to colonoscopy were discussed. Patient voices understanding of risk of perforation, bleeding and infection. Time was allowed for questions which were answered to patients satisfaction. Patient is agreeable to proceed. Plan for anesthesia: MAC  no reported complications    I have discussed with the patient and/or the patient representative the indication, alternatives, and the possible risks and/or complications of the planned anesthesia methods.

## 2018-01-15 RX ORDER — RABEPRAZOLE SODIUM 20 MG/1
TABLET, DELAYED RELEASE ORAL
Qty: 90 TABLET | Refills: 3 | Status: SHIPPED | OUTPATIENT
Start: 2018-01-15 | End: 2018-12-30 | Stop reason: SDUPTHER

## 2018-01-30 ENCOUNTER — TELEPHONE (OUTPATIENT)
Dept: INTERNAL MEDICINE | Age: 53
End: 2018-01-30

## 2018-02-10 ENCOUNTER — OFFICE VISIT (OUTPATIENT)
Dept: URGENT CARE | Age: 53
End: 2018-02-10
Payer: COMMERCIAL

## 2018-02-10 VITALS
HEART RATE: 82 BPM | TEMPERATURE: 98.5 F | HEIGHT: 76 IN | SYSTOLIC BLOOD PRESSURE: 141 MMHG | OXYGEN SATURATION: 98 % | WEIGHT: 185 LBS | DIASTOLIC BLOOD PRESSURE: 86 MMHG | BODY MASS INDEX: 22.53 KG/M2

## 2018-02-10 DIAGNOSIS — R05.9 COUGH: ICD-10-CM

## 2018-02-10 DIAGNOSIS — J06.9 VIRAL UPPER RESPIRATORY TRACT INFECTION: Primary | ICD-10-CM

## 2018-02-10 LAB
INFLUENZA A ANTIBODY: NORMAL
INFLUENZA B ANTIBODY: NORMAL
S PYO AG THROAT QL: NORMAL

## 2018-02-10 PROCEDURE — 87880 STREP A ASSAY W/OPTIC: CPT | Performed by: NURSE PRACTITIONER

## 2018-02-10 PROCEDURE — 99213 OFFICE O/P EST LOW 20 MIN: CPT | Performed by: NURSE PRACTITIONER

## 2018-02-10 PROCEDURE — 87804 INFLUENZA ASSAY W/OPTIC: CPT | Performed by: NURSE PRACTITIONER

## 2018-02-10 RX ORDER — BENZONATATE 100 MG/1
100 CAPSULE ORAL 3 TIMES DAILY PRN
Qty: 21 CAPSULE | Refills: 0 | Status: SHIPPED | OUTPATIENT
Start: 2018-02-10 | End: 2018-02-17

## 2018-02-10 ASSESSMENT — ENCOUNTER SYMPTOMS
NAUSEA: 0
COUGH: 1
DIARRHEA: 0
RHINORRHEA: 1
ABDOMINAL PAIN: 0
SHORTNESS OF BREATH: 0
SORE THROAT: 1
VOMITING: 0

## 2018-02-10 NOTE — PATIENT INSTRUCTIONS
light yellow or clear like water. Hot fluids, such as tea or soup, may help relieve congestion in your nose and throat. If you have kidney, heart, or liver disease and have to limit fluids, talk with your doctor before you increase the amount of fluids you drink. · Try to clear mucus from your lungs by breathing deeply and coughing. · Gargle with warm salt water once an hour. This can help reduce swelling and throat pain. Use 1 teaspoon of salt mixed in 1 cup of warm water. · Do not smoke or allow others to smoke around you. If you need help quitting, talk to your doctor about stop-smoking programs and medicines. These can increase your chances of quitting for good. To avoid spreading the virus  · Cough or sneeze into a tissue. Then throw the tissue away. · If you don't have a tissue, use your hand to cover your cough or sneeze. Then clean your hand. You can also cough into your sleeve. · Wash your hands often. Use soap and warm water. Wash for 15 to 20 seconds each time. · If you don't have soap and water near you, you can clean your hands with alcohol wipes or gel. When should you call for help? Call your doctor now or seek immediate medical care if:  ? · You have a new or higher fever. ? · Your fever lasts more than 48 hours. ? · You have trouble breathing. ? · You have a fever with a stiff neck or a severe headache. ? · You are sensitive to light. ? · You feel very sleepy or confused. ? Watch closely for changes in your health, and be sure to contact your doctor if:  ? · You do not get better as expected. Where can you learn more? Go to https://Autrement (HotelHotel)peLoandesk.CoreOS. org and sign in to your Linki account. Enter D245 in the [facility] box to learn more about \"Viral Respiratory Infection: Care Instructions. \"     If you do not have an account, please click on the \"Sign Up Now\" link. Current as of:  May 12, 2017  Content Version: 11.5  © 5227-0649 Healthwise, Incorporated. Care instructions adapted under license by [facility] ([facility]). If you have questions about a medical condition or this instruction, always ask your healthcare professional. [address] any warranty or liability for your use of this information. Patient Education        Cough: Care Instructions  Your Care Instructions    A cough is your body's response to something that bothers your throat or airways. Many things can cause a cough. You might cough because of a cold or the flu, bronchitis, or asthma. Smoking, postnasal drip, allergies, and stomach acid that backs up into your throat also can cause coughs. A cough is a symptom, not a disease. Most coughs stop when the cause, such as a cold, goes away. You can take a few steps at home to cough less and feel better. Follow-up care is a key part of your treatment and safety. Be sure to make and go to all appointments, and call your doctor if you are having problems. It's also a good idea to know your test results and keep a list of the medicines you take. How can you care for yourself at home? · Drink lots of water and other fluids. This helps thin the mucus and soothes a dry or sore throat. Honey or lemon juice in hot water or tea may ease a dry cough. · Take cough medicine as directed by your doctor. · Prop up your head on pillows to help you breathe and ease a dry cough. · Try cough drops to soothe a dry or sore throat. Cough drops don't stop a cough. Medicine-flavored cough drops are no better than candy-flavored drops or hard candy. · Do not smoke. Avoid secondhand smoke. If you need help quitting, talk to your doctor about stop-smoking programs and medicines. These can increase your chances of quitting for good. When should you call for help? Call 911 anytime you think you may need emergency care. For example, call if:  ? · You have severe trouble breathing.    ?Call your doctor now or seek immediate medical care if:  ?

## 2018-02-10 NOTE — PROGRESS NOTES
[address] [facility] [address] [address]  Dept: [phone]  Loc: [phone]    Cheko Solano is a 45 y.o. male who presents today for his medical conditions/complaints as noted below. Cheko Solano is c/o of Cough; Head Congestion; and Pharyngitis        HPI:     Cough   This is a new problem. The current episode started yesterday. The problem has been unchanged. The cough is non-productive. Associated symptoms include chills, headaches, rhinorrhea and a sore throat. Pertinent negatives include no fever, rash or shortness of breath. Nothing aggravates the symptoms. He has tried OTC cough suppressant for the symptoms. The treatment provided mild relief. Pharyngitis   This is a new problem. The current episode started yesterday. The problem has been unchanged. Associated symptoms include chills, congestion, coughing, headaches and a sore throat. Pertinent negatives include no abdominal pain, fever, nausea, rash or vomiting.        Past Medical History:   Diagnosis Date    Bipolar disorder ([address]     BPH (benign prostatic hypertrophy)     Constipation     Depression     GERD (gastroesophageal reflux disease)     Headache(784.0)     Hypertension     Pelvic floor dysfunction       Past Surgical History:   Procedure Laterality Date    ACHILLES TENDON SURGERY      CHOLECYSTECTOMY      TONSILLECTOMY AND ADENOIDECTOMY         Family History   Problem Relation Age of Onset    Colon Cancer Neg Hx     Colon Polyps Neg Hx     Liver Cancer Neg Hx     Liver Disease Neg Hx     Esophageal Cancer Neg Hx     Stomach Cancer Neg Hx     Rectal Cancer Neg Hx        Social History   Substance Use Topics    Smoking status: Former Smoker    Smokeless tobacco: Never Used    Alcohol use No      Current Outpatient Prescriptions   Medication Sig Dispense Refill    benzonatate (TESSALON PERLES) 100 MG capsule Take 1 capsule by mouth 3 times daily as needed for Cough 21 capsule 0    RABEprazole (ACIPHEX) 20 MG tablet TAKE 1 TABLET DAILY 90 tablet 3    LORazepam (ATIVAN) 0.5 MG tablet TAKE 1 TABLET BY MOUTH EVERY 8 HOURS AS NEEDED FOR ANXIETY 90 tablet 0    buPROPion (WELLBUTRIN XL) 300 MG extended release tablet Take 1 tablet by mouth every morning 90 tablet 3    lamoTRIgine (LAMICTAL) 200 MG tablet Take 1 tablet by mouth daily 90 tablet 3    SUMAtriptan (IMITREX) 100 MG tablet Take 100 mg by mouth once as needed   3    losartan-hydrochlorothiazide (HYZAAR) 50-12.5 MG per tablet Take by mouth daily       No current facility-administered medications for this visit. No Known Allergies    Health Maintenance   Topic Date Due    Hepatitis C screen  1965    HIV screen  06/10/1978    Colon cancer screen colonoscopy  06/10/2013    DTaP/Tdap/Td vaccine (1 - Tdap) 06/18/2017    Flu vaccine (1) 09/01/2017    Potassium monitoring  11/02/2018    Creatinine monitoring  11/02/2018    Lipid screen  11/02/2022       Subjective:      Review of Systems   Constitutional: Positive for chills. Negative for fever. HENT: Positive for congestion, rhinorrhea and sore throat. Respiratory: Positive for cough. Negative for shortness of breath. Gastrointestinal: Negative for abdominal pain, diarrhea, nausea and vomiting. Skin: Negative for rash. Neurological: Positive for headaches. Objective:     Physical Exam   Constitutional: He is oriented to person, place, and time. He appears well-developed and well-nourished. No distress. HENT:   Head: Normocephalic. Right Ear: External ear and ear canal normal. A middle ear effusion (clear) is present. Left Ear: External ear and ear canal normal.   Nose: Nose normal.   Mouth/Throat: Uvula is midline and mucous membranes are normal. Posterior oropharyngeal erythema present. No posterior oropharyngeal edema. Eyes: Pupils are equal, round, and reactive to light. Neck: Normal range of motion. These can increase your chances of quitting for good. When should you call for help? Call 911 anytime you think you may need emergency care. For example, call if:  ? · You have severe trouble breathing. ?Call your doctor now or seek immediate medical care if:  ? · You cough up blood. ? · You have new or worse trouble breathing. ? · You have a new or higher fever. ? · You have a new rash. ? Watch closely for changes in your health, and be sure to contact your doctor if:  ? · You cough more deeply or more often, especially if you notice more mucus or a change in the color of your mucus. ? · You have new symptoms, such as a sore throat, an earache, or sinus pain. ? · You do not get better as expected. Where can you learn more? Go to https://eMinorpepiceweb.IQ Elite. org and sign in to your shoutr account. Enter D279 in the GoPago box to learn more about \"Cough: Care Instructions. \"     If you do not have an account, please click on the \"Sign Up Now\" link. Current as of: May 12, 2017  Content Version: 11.5  © 5737-8372 Healthwise, Incorporated. Care instructions adapted under license by [facility] ([facility]). If you have questions about a medical condition or this instruction, always ask your healthcare professional. [address] any warranty or liability for your use of this information.              Electronically signed by GUILLAUME Leonardo on 2/10/2018 at 8:42 AM

## 2018-03-05 RX ORDER — LOSARTAN POTASSIUM AND HYDROCHLOROTHIAZIDE 12.5; 5 MG/1; MG/1
TABLET ORAL
Qty: 90 TABLET | Refills: 3 | Status: SHIPPED | OUTPATIENT
Start: 2018-03-05 | End: 2018-12-11 | Stop reason: SDUPTHER

## 2018-04-12 PROBLEM — Z00.00 ANNUAL PHYSICAL EXAM: Status: RESOLVED | Noted: 2017-11-21 | Resolved: 2018-04-12

## 2018-04-12 PROBLEM — Z12.11 COLON CANCER SCREENING: Status: RESOLVED | Noted: 2017-11-09 | Resolved: 2018-04-12

## 2018-08-27 ENCOUNTER — HOSPITAL ENCOUNTER (INPATIENT)
Age: 53
LOS: 4 days | Discharge: HOME | DRG: 392 | End: 2018-09-01
Attending: EMERGENCY MEDICINE | Admitting: SURGERY
Payer: COMMERCIAL

## 2018-08-27 DIAGNOSIS — K57.20 DIVERTICULITIS OF LARGE INTESTINE WITH PERFORATION WITHOUT ABSCESS OR BLEEDING: Primary | ICD-10-CM

## 2018-08-27 PROCEDURE — 99285 EMERGENCY DEPT VISIT HI MDM: CPT

## 2018-08-27 RX ORDER — ASPIRIN 325 MG
325 TABLET ORAL DAILY
COMMUNITY

## 2018-08-27 ASSESSMENT — PAIN SCALES - GENERAL: PAINLEVEL_OUTOF10: 8

## 2018-08-27 ASSESSMENT — PAIN DESCRIPTION - ORIENTATION: ORIENTATION: LEFT;LOWER

## 2018-08-27 ASSESSMENT — PAIN DESCRIPTION - LOCATION: LOCATION: ABDOMEN

## 2018-08-27 ASSESSMENT — PAIN DESCRIPTION - PAIN TYPE: TYPE: ACUTE PAIN

## 2018-08-27 ASSESSMENT — PAIN DESCRIPTION - FREQUENCY: FREQUENCY: CONTINUOUS

## 2018-08-27 ASSESSMENT — PAIN DESCRIPTION - DESCRIPTORS: DESCRIPTORS: SHARP;STABBING

## 2018-08-28 ENCOUNTER — APPOINTMENT (OUTPATIENT)
Dept: CT IMAGING | Age: 53
DRG: 392 | End: 2018-08-28
Payer: COMMERCIAL

## 2018-08-28 PROBLEM — K57.92 DIVERTICULITIS: Status: ACTIVE | Noted: 2018-08-28

## 2018-08-28 PROBLEM — K21.9 GASTROESOPHAGEAL REFLUX DISEASE WITHOUT ESOPHAGITIS: Chronic | Status: ACTIVE | Noted: 2018-08-28

## 2018-08-28 PROBLEM — I10 ESSENTIAL HYPERTENSION: Chronic | Status: ACTIVE | Noted: 2018-08-28

## 2018-08-28 PROBLEM — F31.81 BIPOLAR 2 DISORDER, MAJOR DEPRESSIVE EPISODE (HCC): Chronic | Status: ACTIVE | Noted: 2017-01-06

## 2018-08-28 LAB
ALBUMIN SERPL-MCNC: 4 G/DL (ref 3.5–5.2)
ALP BLD-CCNC: 78 U/L (ref 40–130)
ALT SERPL-CCNC: 18 U/L (ref 5–41)
ANION GAP SERPL CALCULATED.3IONS-SCNC: 11 MMOL/L (ref 7–19)
AST SERPL-CCNC: 22 U/L (ref 5–40)
BASOPHILS ABSOLUTE: 0 K/UL (ref 0–0.2)
BASOPHILS RELATIVE PERCENT: 0.2 % (ref 0–1)
BILIRUB SERPL-MCNC: <0.2 MG/DL (ref 0.2–1.2)
BILIRUBIN URINE: NEGATIVE
BLOOD, URINE: NEGATIVE
BUN BLDV-MCNC: 15 MG/DL (ref 6–20)
CALCIUM SERPL-MCNC: 9.3 MG/DL (ref 8.6–10)
CHLORIDE BLD-SCNC: 101 MMOL/L (ref 98–111)
CLARITY: CLEAR
CO2: 29 MMOL/L (ref 22–29)
COLOR: YELLOW
CREAT SERPL-MCNC: 1.2 MG/DL (ref 0.5–1.2)
EOSINOPHILS ABSOLUTE: 0.3 K/UL (ref 0–0.6)
EOSINOPHILS RELATIVE PERCENT: 2.4 % (ref 0–5)
GFR NON-AFRICAN AMERICAN: >60
GLUCOSE BLD-MCNC: 126 MG/DL (ref 74–109)
GLUCOSE URINE: NEGATIVE MG/DL
HCT VFR BLD CALC: 41.4 % (ref 42–52)
HEMOGLOBIN: 13.4 G/DL (ref 14–18)
KETONES, URINE: NEGATIVE MG/DL
LEUKOCYTE ESTERASE, URINE: NEGATIVE
LIPASE: 60 U/L (ref 13–60)
LYMPHOCYTES ABSOLUTE: 1.1 K/UL (ref 1.1–4.5)
LYMPHOCYTES RELATIVE PERCENT: 10.4 % (ref 20–40)
MCH RBC QN AUTO: 29.3 PG (ref 27–31)
MCHC RBC AUTO-ENTMCNC: 32.4 G/DL (ref 33–37)
MCV RBC AUTO: 90.6 FL (ref 80–94)
MONOCYTES ABSOLUTE: 0.5 K/UL (ref 0–0.9)
MONOCYTES RELATIVE PERCENT: 4.9 % (ref 0–10)
NEUTROPHILS ABSOLUTE: 8.7 K/UL (ref 1.5–7.5)
NEUTROPHILS RELATIVE PERCENT: 81.8 % (ref 50–65)
NITRITE, URINE: NEGATIVE
PDW BLD-RTO: 12.8 % (ref 11.5–14.5)
PH UA: 7
PLATELET # BLD: 205 K/UL (ref 130–400)
PMV BLD AUTO: 10.2 FL (ref 9.4–12.4)
POTASSIUM SERPL-SCNC: 3.8 MMOL/L (ref 3.5–5)
PROTEIN UA: NEGATIVE MG/DL
RBC # BLD: 4.57 M/UL (ref 4.7–6.1)
SODIUM BLD-SCNC: 141 MMOL/L (ref 136–145)
SPECIFIC GRAVITY UA: 1.04
TOTAL PROTEIN: 6 G/DL (ref 6.6–8.7)
URINE REFLEX TO CULTURE: NORMAL
UROBILINOGEN, URINE: 0.2 E.U./DL
WBC # BLD: 10.6 K/UL (ref 4.8–10.8)

## 2018-08-28 PROCEDURE — 2580000003 HC RX 258: Performed by: SURGERY

## 2018-08-28 PROCEDURE — 74177 CT ABD & PELVIS W/CONTRAST: CPT

## 2018-08-28 PROCEDURE — 6360000004 HC RX CONTRAST MEDICATION: Performed by: EMERGENCY MEDICINE

## 2018-08-28 PROCEDURE — 81003 URINALYSIS AUTO W/O SCOPE: CPT

## 2018-08-28 PROCEDURE — 6370000000 HC RX 637 (ALT 250 FOR IP): Performed by: NURSE PRACTITIONER

## 2018-08-28 PROCEDURE — 99223 1ST HOSP IP/OBS HIGH 75: CPT | Performed by: SURGERY

## 2018-08-28 PROCEDURE — 51798 US URINE CAPACITY MEASURE: CPT

## 2018-08-28 PROCEDURE — 6360000002 HC RX W HCPCS: Performed by: EMERGENCY MEDICINE

## 2018-08-28 PROCEDURE — 1210000000 HC MED SURG R&B

## 2018-08-28 PROCEDURE — 80053 COMPREHEN METABOLIC PANEL: CPT

## 2018-08-28 PROCEDURE — 2700000000 HC OXYGEN THERAPY PER DAY

## 2018-08-28 PROCEDURE — 2580000003 HC RX 258: Performed by: EMERGENCY MEDICINE

## 2018-08-28 PROCEDURE — 6360000002 HC RX W HCPCS: Performed by: SURGERY

## 2018-08-28 PROCEDURE — 96376 TX/PRO/DX INJ SAME DRUG ADON: CPT

## 2018-08-28 PROCEDURE — 85025 COMPLETE CBC W/AUTO DIFF WBC: CPT

## 2018-08-28 PROCEDURE — 2500000003 HC RX 250 WO HCPCS: Performed by: EMERGENCY MEDICINE

## 2018-08-28 PROCEDURE — 96365 THER/PROPH/DIAG IV INF INIT: CPT

## 2018-08-28 PROCEDURE — 83690 ASSAY OF LIPASE: CPT

## 2018-08-28 PROCEDURE — 99285 EMERGENCY DEPT VISIT HI MDM: CPT | Performed by: EMERGENCY MEDICINE

## 2018-08-28 PROCEDURE — 99253 IP/OBS CNSLTJ NEW/EST LOW 45: CPT | Performed by: FAMILY MEDICINE

## 2018-08-28 PROCEDURE — 36415 COLL VENOUS BLD VENIPUNCTURE: CPT

## 2018-08-28 PROCEDURE — 96375 TX/PRO/DX INJ NEW DRUG ADDON: CPT

## 2018-08-28 PROCEDURE — 6370000000 HC RX 637 (ALT 250 FOR IP): Performed by: SURGERY

## 2018-08-28 RX ORDER — PANTOPRAZOLE SODIUM 40 MG/1
40 TABLET, DELAYED RELEASE ORAL
Status: DISCONTINUED | OUTPATIENT
Start: 2018-08-29 | End: 2018-09-01 | Stop reason: HOSPADM

## 2018-08-28 RX ORDER — LOSARTAN POTASSIUM AND HYDROCHLOROTHIAZIDE 12.5; 5 MG/1; MG/1
1 TABLET ORAL DAILY
Status: DISCONTINUED | OUTPATIENT
Start: 2018-08-28 | End: 2018-08-28 | Stop reason: SDUPTHER

## 2018-08-28 RX ORDER — 0.9 % SODIUM CHLORIDE 0.9 %
1000 INTRAVENOUS SOLUTION INTRAVENOUS ONCE
Status: COMPLETED | OUTPATIENT
Start: 2018-08-28 | End: 2018-08-28

## 2018-08-28 RX ORDER — ONDANSETRON 2 MG/ML
4 INJECTION INTRAMUSCULAR; INTRAVENOUS EVERY 4 HOURS PRN
Status: DISCONTINUED | OUTPATIENT
Start: 2018-08-28 | End: 2018-09-01 | Stop reason: HOSPADM

## 2018-08-28 RX ORDER — LORAZEPAM 0.5 MG/1
0.5 TABLET ORAL EVERY 8 HOURS PRN
Status: DISCONTINUED | OUTPATIENT
Start: 2018-08-28 | End: 2018-08-29

## 2018-08-28 RX ORDER — LOSARTAN POTASSIUM 50 MG/1
50 TABLET ORAL DAILY
Status: DISCONTINUED | OUTPATIENT
Start: 2018-08-28 | End: 2018-08-31

## 2018-08-28 RX ORDER — MORPHINE SULFATE/0.9% NACL/PF 1 MG/ML
2 SYRINGE (ML) INJECTION
Status: DISCONTINUED | OUTPATIENT
Start: 2018-08-28 | End: 2018-09-01 | Stop reason: HOSPADM

## 2018-08-28 RX ORDER — ONDANSETRON 2 MG/ML
4 INJECTION INTRAMUSCULAR; INTRAVENOUS ONCE
Status: COMPLETED | OUTPATIENT
Start: 2018-08-28 | End: 2018-08-28

## 2018-08-28 RX ORDER — SODIUM CHLORIDE 0.9 % (FLUSH) 0.9 %
10 SYRINGE (ML) INJECTION PRN
Status: DISCONTINUED | OUTPATIENT
Start: 2018-08-28 | End: 2018-09-01 | Stop reason: HOSPADM

## 2018-08-28 RX ORDER — BUPROPION HYDROCHLORIDE 300 MG/1
100 TABLET ORAL EVERY MORNING
Status: DISCONTINUED | OUTPATIENT
Start: 2018-08-29 | End: 2018-08-28 | Stop reason: SDUPTHER

## 2018-08-28 RX ORDER — ACETAMINOPHEN 325 MG/1
650 TABLET ORAL EVERY 4 HOURS PRN
Status: DISCONTINUED | OUTPATIENT
Start: 2018-08-28 | End: 2018-09-01 | Stop reason: HOSPADM

## 2018-08-28 RX ORDER — SODIUM CHLORIDE 9 MG/ML
INJECTION, SOLUTION INTRAVENOUS CONTINUOUS
Status: DISCONTINUED | OUTPATIENT
Start: 2018-08-28 | End: 2018-09-01 | Stop reason: HOSPADM

## 2018-08-28 RX ORDER — HYDROMORPHONE HCL IN 0.9% NACL 0.5 MG/ML
1 SYRINGE (ML) INTRAVENOUS
Status: DISCONTINUED | OUTPATIENT
Start: 2018-08-28 | End: 2018-09-01 | Stop reason: HOSPADM

## 2018-08-28 RX ORDER — GUAIFENESIN 400 MG/1
400 TABLET ORAL EVERY 4 HOURS PRN
COMMUNITY
End: 2018-10-25

## 2018-08-28 RX ORDER — BUPROPION HYDROCHLORIDE 150 MG/1
300 TABLET ORAL DAILY
Status: DISCONTINUED | OUTPATIENT
Start: 2018-08-29 | End: 2018-09-01 | Stop reason: HOSPADM

## 2018-08-28 RX ORDER — LAMOTRIGINE 100 MG/1
200 TABLET ORAL DAILY
Status: DISCONTINUED | OUTPATIENT
Start: 2018-08-28 | End: 2018-09-01 | Stop reason: HOSPADM

## 2018-08-28 RX ORDER — NICOTINE 21 MG/24HR
1 PATCH, TRANSDERMAL 24 HOURS TRANSDERMAL DAILY
Status: DISCONTINUED | OUTPATIENT
Start: 2018-08-28 | End: 2018-09-01 | Stop reason: HOSPADM

## 2018-08-28 RX ORDER — HYDROMORPHONE HCL IN 0.9% NACL 0.5 MG/ML
0.5 SYRINGE (ML) INTRAVENOUS
Status: DISCONTINUED | OUTPATIENT
Start: 2018-08-28 | End: 2018-09-01 | Stop reason: HOSPADM

## 2018-08-28 RX ORDER — HYDROCHLOROTHIAZIDE 12.5 MG/1
12.5 CAPSULE, GELATIN COATED ORAL DAILY
Status: DISCONTINUED | OUTPATIENT
Start: 2018-08-28 | End: 2018-08-31

## 2018-08-28 RX ORDER — GUAIFENESIN 200 MG/1
400 TABLET ORAL EVERY 4 HOURS PRN
Status: DISCONTINUED | OUTPATIENT
Start: 2018-08-28 | End: 2018-09-01 | Stop reason: HOSPADM

## 2018-08-28 RX ORDER — SODIUM CHLORIDE 0.9 % (FLUSH) 0.9 %
10 SYRINGE (ML) INJECTION EVERY 12 HOURS SCHEDULED
Status: DISCONTINUED | OUTPATIENT
Start: 2018-08-28 | End: 2018-09-01 | Stop reason: HOSPADM

## 2018-08-28 RX ORDER — AZITHROMYCIN 1 G
1 PACKET (EA) ORAL ONCE
COMMUNITY
End: 2018-09-26 | Stop reason: ALTCHOICE

## 2018-08-28 RX ORDER — ASPIRIN 325 MG
325 TABLET ORAL DAILY
Status: DISCONTINUED | OUTPATIENT
Start: 2018-08-28 | End: 2018-08-28

## 2018-08-28 RX ADMIN — Medication 2 MG: at 05:58

## 2018-08-28 RX ADMIN — SODIUM CHLORIDE: 9 INJECTION, SOLUTION INTRAVENOUS at 02:42

## 2018-08-28 RX ADMIN — ENOXAPARIN SODIUM 40 MG: 40 INJECTION SUBCUTANEOUS at 08:41

## 2018-08-28 RX ADMIN — Medication 1 MG: at 02:42

## 2018-08-28 RX ADMIN — ACETAMINOPHEN 650 MG: 325 TABLET ORAL at 22:07

## 2018-08-28 RX ADMIN — SODIUM CHLORIDE: 9 INJECTION, SOLUTION INTRAVENOUS at 21:58

## 2018-08-28 RX ADMIN — Medication 1 MG: at 00:41

## 2018-08-28 RX ADMIN — Medication 1 MG: at 21:58

## 2018-08-28 RX ADMIN — IOPAMIDOL 95 ML: 755 INJECTION, SOLUTION INTRAVENOUS at 01:05

## 2018-08-28 RX ADMIN — Medication 0.5 MG: at 14:09

## 2018-08-28 RX ADMIN — ONDANSETRON HYDROCHLORIDE 4 MG: 2 INJECTION, SOLUTION INTRAMUSCULAR; INTRAVENOUS at 21:58

## 2018-08-28 RX ADMIN — CEFTRIAXONE 1 G: 1 INJECTION, POWDER, FOR SOLUTION INTRAMUSCULAR; INTRAVENOUS at 23:58

## 2018-08-28 RX ADMIN — SODIUM CHLORIDE 1000 ML: 9 INJECTION, SOLUTION INTRAVENOUS at 00:41

## 2018-08-28 RX ADMIN — Medication 1 MG: at 17:19

## 2018-08-28 RX ADMIN — Medication 10 ML: at 21:59

## 2018-08-28 RX ADMIN — METRONIDAZOLE 500 MG: 500 INJECTION, SOLUTION INTRAVENOUS at 11:39

## 2018-08-28 RX ADMIN — Medication 2 MG: at 08:50

## 2018-08-28 RX ADMIN — METRONIDAZOLE 500 MG: 500 INJECTION, SOLUTION INTRAVENOUS at 18:47

## 2018-08-28 RX ADMIN — METRONIDAZOLE 500 MG: 500 INJECTION, SOLUTION INTRAVENOUS at 02:42

## 2018-08-28 RX ADMIN — ONDANSETRON HYDROCHLORIDE 4 MG: 2 INJECTION, SOLUTION INTRAMUSCULAR; INTRAVENOUS at 00:41

## 2018-08-28 RX ADMIN — Medication 2 MG: at 12:35

## 2018-08-28 RX ADMIN — CEFTRIAXONE 1 G: 1 INJECTION, POWDER, FOR SOLUTION INTRAMUSCULAR; INTRAVENOUS at 02:42

## 2018-08-28 RX ADMIN — SODIUM CHLORIDE: 9 INJECTION, SOLUTION INTRAVENOUS at 11:39

## 2018-08-28 RX ADMIN — ONDANSETRON HYDROCHLORIDE 4 MG: 2 INJECTION, SOLUTION INTRAMUSCULAR; INTRAVENOUS at 16:41

## 2018-08-28 RX ADMIN — LORAZEPAM 0.5 MG: 0.5 TABLET ORAL at 17:18

## 2018-08-28 ASSESSMENT — ENCOUNTER SYMPTOMS
BACK PAIN: 0
NAUSEA: 0
DIARRHEA: 0
VOMITING: 0
ABDOMINAL PAIN: 1
SHORTNESS OF BREATH: 0
RHINORRHEA: 0
SORE THROAT: 0

## 2018-08-28 ASSESSMENT — PAIN SCALES - GENERAL
PAINLEVEL_OUTOF10: 8
PAINLEVEL_OUTOF10: 7
PAINLEVEL_OUTOF10: 3
PAINLEVEL_OUTOF10: 3
PAINLEVEL_OUTOF10: 7
PAINLEVEL_OUTOF10: 9
PAINLEVEL_OUTOF10: 3
PAINLEVEL_OUTOF10: 7
PAINLEVEL_OUTOF10: 3
PAINLEVEL_OUTOF10: 6
PAINLEVEL_OUTOF10: 3
PAINLEVEL_OUTOF10: 8
PAINLEVEL_OUTOF10: 8
PAINLEVEL_OUTOF10: 6

## 2018-08-28 NOTE — ED PROVIDER NOTES
[facility]  eMERGENCY dEPARTMENT eNCOUnter      Pt Name: Obdulia Mace  MRN: [id number]  [place] 1965  Date of evaluation: 8/27/2018  Provider: Mora Joshi MD    [address] [place] [address]       Chief Complaint   Patient presents with    Abdominal Pain     Started around 2000 described as sharp, stabbing and sensitive in left lower Quadrant. HISTORY OF PRESENT ILLNESS   (Location/Symptom, Timing/Onset, Context/Setting, Quality, Duration, Modifying Factors, Severity)  Note limiting factors. Obdulia Mace is a 52 y.o. male who presents to the emergency department With severe sharp stabbing left lower quadrant abdominal pain radiating to his rectum. It started abruptly around 8:00. Patient denies any history of diverticulitis or colitis in the past. No dysuria or hematuria. No history of kidney stones. Hurts to lie on his left side. Denies any nausea vomiting or diarrhea. No bloody stool. HPI    Nursing Notes were reviewed. REVIEW OF SYSTEMS    (2-9 systems for level 4, 10 or more for level 5)     Review of Systems   Constitutional: Negative for chills and fever. HENT: Negative for rhinorrhea and sore throat. Respiratory: Negative for shortness of breath. Cardiovascular: Negative for chest pain and leg swelling. Gastrointestinal: Positive for abdominal pain. Negative for diarrhea, nausea and vomiting. Genitourinary: Negative for difficulty urinating. Musculoskeletal: Negative for back pain and neck pain. Skin: Negative for rash. Neurological: Negative for weakness and headaches. Psychiatric/Behavioral: Negative for confusion. A complete review of systems was performed and is negative except as noted above in the HPI.        PAST MEDICAL HISTORY     Past Medical History:   Diagnosis Date    Arthritis     Bipolar disorder ([facility] [address]     BPH (benign prostatic hypertrophy)     Constipation     Depression     GERD (gastroesophageal reflux disease)     Headache(784.0)     tobacco: Never Used    Alcohol use No    Drug use: No    Sexual activity: Not Asked     Other Topics Concern    None     Social History Narrative    None       SCREENINGS    Ransom Coma Scale  Eye Opening: Spontaneous  Best Verbal Response: Oriented  Best Motor Response: Obeys commands  Ransom Coma Scale Score: 15        PHYSICAL EXAM    (up to 7 for level 4, 8 or more for level 5)     ED Triage Vitals [08/27/18 2333]   BP Temp Temp Source Pulse Resp SpO2 Height Weight   126/71 97.8 °F (36.6 °C) Oral 61 18 98 % 6' 4\" (1.93 m) 185 lb (83.9 kg)       Physical Exam   Constitutional: He is oriented to person, place, and time. He appears well-developed and well-nourished. No distress. HENT:   Head: Normocephalic and atraumatic. Eyes: Pupils are equal, round, and reactive to light. Neck: Normal range of motion. Neck supple. Cardiovascular: Normal rate, regular rhythm, normal heart sounds and intact distal pulses. Pulmonary/Chest: Effort normal and breath sounds normal. No respiratory distress. Abdominal: Soft. Bowel sounds are normal. He exhibits no distension. There is tenderness (left lower quadrant). There is guarding. Musculoskeletal: Normal range of motion. He exhibits no edema. Neurological: He is alert and oriented to person, place, and time. No cranial nerve deficit. He exhibits normal muscle tone. Coordination normal.   Skin: Skin is warm and dry. No rash noted. He is not diaphoretic. Psychiatric: He has a normal mood and affect. His behavior is normal.   Nursing note and vitals reviewed.       DIAGNOSTIC RESULTS     EKG: All EKG's are interpreted by the Emergency Department Physician who either signs or Co-signs this chart in the absence of a cardiologist.      RADIOLOGY:   Non-plain film images such as CT, Ultrasound and MRI are read by the radiologist. Plain radiographic images are visualized and preliminarily interpreted by the emergency physician with the below (Please note that portions of this note were completed with a voice recognition program.  Efforts were made to edit the dictations but occasionally words are mis-transcribed.)    Chintan Lema MD (electronically signed)  Attending Emergency Physician           Chintan Lema MD  08/28/18 5905

## 2018-08-28 NOTE — PROGRESS NOTES
4 Eyes Skin Assessment    Nupur Garcia is being assessed upon: Admission    I agree that I, Sandy Victoria, along with Cole Yang RN (either 2 RN's or 1 LPN and 1 RN) have performed a thorough Head to Toe Skin Assessment on the patient. ALL assessment sites listed below have been assessed. Areas assessed by both nurses:     [x]   Head, Face, and Ears   [x]   Shoulders, Back, and Chest  [x]   Arms, Elbows, and Hands   [x]   Coccyx, Sacrum, and Ischium  [x]   Legs, Feet, and Heels    Does the Patient have Skin Breakdown?  No    Andrew Prevention initiated: No  Wound Care Orders initiated: No    WOC nurse consulted for Pressure Injury (Stage 3,4, Unstageable, DTI, NWPT, and Complex wounds) and New or Established Ostomies: No        Primary Nurse eSignature: Sandy Victoria RN on 8/28/2018 at 4:02 AM      Co-Signer eSignature: Electronically signed by Cole Yang RN on 8/28/18 at 4:19 AM

## 2018-08-28 NOTE — CONSULTS
MD   Stopped at 08/28/18 1353    0.9 % sodium chloride infusion   Intravenous Continuous Beck Medellin  mL/hr at 08/28/18 1139      sodium chloride flush 0.9 % injection 10 mL  10 mL Intravenous 2 times per day Bre Wayne MD        sodium chloride flush 0.9 % injection 10 mL  10 mL Intravenous PRN Bre Wayne MD        acetaminophen (TYLENOL) tablet 650 mg  650 mg Oral Q4H PRN Bre Wayne MD        enoxaparin (LOVENOX) injection 40 mg  40 mg Subcutaneous Daily Bre Wayne MD   40 mg at 08/28/18 0841    morphine PCA 1 mg/mL  2 mg Intravenous Q2H PRN Bre Wayne MD   2 mg at 08/28/18 1235    ondansetron (ZOFRAN) injection 4 mg  4 mg Intravenous Q4H PRN Bre Wayne MD        nicotine (NICODERM CQ) 21 MG/24HR 1 patch  1 patch Transdermal Daily Bre Wayne MD   1 patch at 08/28/18 5532    HYDROmorphone (DILAUDID) 0.5-0.9 MG/ML-% injection 0.5 mg  0.5 mg Intravenous Q2H PRN Bre Wayne MD   0.5 mg at 08/28/18 1409    aspirin tablet 325 mg  325 mg Oral Daily GUILLAUME Boswell        [START ON 8/29/2018] buPROPion (WELLBUTRIN XL) extended release tablet 300 mg  300 mg Oral QAM GUILLAUME Boswell        guaiFENesin tablet 400 mg  400 mg Oral Q4H PRN GUILLAUME Boswell        lamoTRIgine (LAMICTAL) tablet 200 mg  200 mg Oral Daily GUILLAUME Boswell        LORazepam (ATIVAN) tablet 0.5 mg  0.5 mg Oral Q8H PRN GUILLAUME Boswell        losartan-hydrochlorothiazide (HYZAAR) 50-12.5 MG per tablet 1 tablet  1 tablet Oral Daily GUILLAUME Boswell        [START ON 8/29/2018] pantoprazole (PROTONIX) tablet 40 mg  40 mg Oral QAM AC GUILLAUME Boswell           Social History:  Reports that he has quit smoking. He has never used smokeless tobacco. He reports that he does not drink alcohol or use drugs.     Family History:  Family History   Problem Relation Age of Onset    Colon Cancer Neg Hx     Colon Polyps Neg Hx     Liver Cancer Neg Hx     Liver Disease Neg Hx     Esophageal Cancer Neg Hx     Stomach Cancer Neg Hx     Rectal Cancer Neg Hx        REVIEW OF SYSTEMS:  General: Denies any fever or chills. Denies any unexplained weight loss or gain. Denies any change in activity or endurance. HEENT: Denies any headaches or visual changes. Respiratory: Denies any cough or hoarseness. Cardiac: Denies any chest pain or pressure. Denies any palpitations. Denies any presyncope or syncope. Denies any orthopnea or PND. Denies any lower extremity edema. GI: Abdominal pain, LLQ radiating to rectum. No nausea/vomiting. : Denies any hematuria, frequency, hesitancy, or dysuria. Musculoskeletal: Denies any pain or swelling in his joints. Neurological: Denies any paraesthesias. Denies any history of seizure or stroke symptoms. Psychological: Denies any problems with anxiety or depression. All other systems are negative except where stated above. PHYSICAL EXAM:  BP (!) 146/80   Pulse 82   Temp 99.6 °F (37.6 °C) (Oral)   Resp 18   Ht 6' 4\" (1.93 m)   Wt 188 lb 3.2 oz (85.4 kg)   SpO2 96%   BMI 22.91 kg/m²   General appearance: Demonstrates a well-developed, well-nourished male who is alert and oriented in no acute distress. NECK: Supple. NO JVD. No carotids bruits auscultated. Chest: Clear to auscultation bilaterally without wheezes or rhonchi. Cardiac: Normal heart tones with regular rate and rhythm, S1, S2 normal. No murmurs, gallops, or rubs auscultated. Abdomen: Soft, tender in mid to left side without guarding; non-distended normal bowel sounds no masses, no organomegaly. Extremities: No clubbing or cyanosis. No peripheral edema. Peripheral pulses palpable. Skin: Skin color, texture, turgor normal. No rashes or lesions  Neurologic: Grossly intact. LABS AND DIAGNOSTICS:    CT Abdomen Pelvis with IV Contrast:  1.  Findings suspicious for a perforated sigmoid diverticulitis with a small amount of extraluminal free air and dependent free fluid in the pelvis. No loculated abscess. 2. Distended stomach may relate to ileus. Moderate volume of stool in the colon. 3. Moderate atherosclerotic changes of the abdominal aorta extending into the iliac arteries. CBC with Differential:   Lab Results   Component Value Date    WBC 10.6 08/27/2018    RBC 4.57 08/27/2018    HGB 13.4 08/27/2018    HCT 41.4 08/27/2018     08/27/2018    MCV 90.6 08/27/2018    LYMPHOPCT 10.4 08/27/2018     BMP:   Lab Results   Component Value Date     08/27/2018    K 3.8 08/27/2018     08/27/2018    CO2 29 08/27/2018    BUN 15 08/27/2018    CREATININE 1.2 08/27/2018    CALCIUM 9.3 08/27/2018    LABGLOM >60 08/27/2018    GLUCOSE 126 08/27/2018         ASSESSMENT:    1. Ruptured Diverticulum - Followed by General Surgery  2. Essential HTN  3. Bipolar Disorder  4. GERD  5. Pelvic Floor Dysfunction     PLAN:    1. Resume Home Medications as Clinically Indicated  2. Medically stable, call if needed.     Cindi Breaux, UGILLAUME

## 2018-08-28 NOTE — ED NOTES
Pt placed on 2L NC, 02 sat dropping down to 80% after pain meds. MD notified.       Shazia Reed RN  08/28/18 3597

## 2018-08-29 PROCEDURE — 6360000002 HC RX W HCPCS: Performed by: SURGERY

## 2018-08-29 PROCEDURE — 6370000000 HC RX 637 (ALT 250 FOR IP): Performed by: INTERNAL MEDICINE

## 2018-08-29 PROCEDURE — 2580000003 HC RX 258: Performed by: EMERGENCY MEDICINE

## 2018-08-29 PROCEDURE — 99232 SBSQ HOSP IP/OBS MODERATE 35: CPT | Performed by: INTERNAL MEDICINE

## 2018-08-29 PROCEDURE — APPSS15 APP SPLIT SHARED TIME 0-15 MINUTES: Performed by: NURSE PRACTITIONER

## 2018-08-29 PROCEDURE — 2700000000 HC OXYGEN THERAPY PER DAY

## 2018-08-29 PROCEDURE — 2500000003 HC RX 250 WO HCPCS: Performed by: EMERGENCY MEDICINE

## 2018-08-29 PROCEDURE — 1210000000 HC MED SURG R&B

## 2018-08-29 PROCEDURE — 6370000000 HC RX 637 (ALT 250 FOR IP): Performed by: NURSE PRACTITIONER

## 2018-08-29 PROCEDURE — 6370000000 HC RX 637 (ALT 250 FOR IP): Performed by: SURGERY

## 2018-08-29 PROCEDURE — 2580000003 HC RX 258: Performed by: SURGERY

## 2018-08-29 PROCEDURE — 99232 SBSQ HOSP IP/OBS MODERATE 35: CPT | Performed by: SURGERY

## 2018-08-29 RX ORDER — TAMSULOSIN HYDROCHLORIDE 0.4 MG/1
0.4 CAPSULE ORAL DAILY
Status: DISCONTINUED | OUTPATIENT
Start: 2018-08-29 | End: 2018-09-01 | Stop reason: HOSPADM

## 2018-08-29 RX ORDER — PROMETHAZINE HYDROCHLORIDE 25 MG/ML
12.5 INJECTION, SOLUTION INTRAMUSCULAR; INTRAVENOUS EVERY 6 HOURS PRN
Status: DISCONTINUED | OUTPATIENT
Start: 2018-08-29 | End: 2018-09-01 | Stop reason: HOSPADM

## 2018-08-29 RX ORDER — PROMETHAZINE HYDROCHLORIDE 25 MG/ML
6.25 INJECTION, SOLUTION INTRAMUSCULAR; INTRAVENOUS EVERY 6 HOURS PRN
Status: DISCONTINUED | OUTPATIENT
Start: 2018-08-29 | End: 2018-08-29

## 2018-08-29 RX ORDER — LORAZEPAM 0.5 MG/1
0.5 TABLET ORAL 3 TIMES DAILY
Status: DISCONTINUED | OUTPATIENT
Start: 2018-08-29 | End: 2018-09-01 | Stop reason: HOSPADM

## 2018-08-29 RX ADMIN — PROMETHAZINE HYDROCHLORIDE 6.25 MG: 25 INJECTION INTRAMUSCULAR; INTRAVENOUS at 14:19

## 2018-08-29 RX ADMIN — Medication 1 MG: at 03:21

## 2018-08-29 RX ADMIN — PROMETHAZINE HYDROCHLORIDE 6.25 MG: 25 INJECTION INTRAMUSCULAR; INTRAVENOUS at 21:12

## 2018-08-29 RX ADMIN — LORAZEPAM 0.5 MG: 0.5 TABLET ORAL at 15:03

## 2018-08-29 RX ADMIN — SODIUM CHLORIDE: 9 INJECTION, SOLUTION INTRAVENOUS at 21:13

## 2018-08-29 RX ADMIN — Medication 1 MG: at 00:03

## 2018-08-29 RX ADMIN — LORAZEPAM 0.5 MG: 0.5 TABLET ORAL at 21:12

## 2018-08-29 RX ADMIN — Medication 10 ML: at 21:12

## 2018-08-29 RX ADMIN — PANTOPRAZOLE SODIUM 40 MG: 40 TABLET, DELAYED RELEASE ORAL at 06:15

## 2018-08-29 RX ADMIN — LOSARTAN POTASSIUM 50 MG: 50 TABLET ORAL at 08:37

## 2018-08-29 RX ADMIN — BUPROPION HYDROCHLORIDE 300 MG: 150 TABLET, FILM COATED, EXTENDED RELEASE ORAL at 21:12

## 2018-08-29 RX ADMIN — ACETAMINOPHEN 650 MG: 325 TABLET ORAL at 06:21

## 2018-08-29 RX ADMIN — LORAZEPAM 0.5 MG: 0.5 TABLET ORAL at 08:36

## 2018-08-29 RX ADMIN — Medication 1 MG: at 21:12

## 2018-08-29 RX ADMIN — PROMETHAZINE HYDROCHLORIDE 12.5 MG: 25 INJECTION INTRAMUSCULAR; INTRAVENOUS at 22:13

## 2018-08-29 RX ADMIN — ACETAMINOPHEN 650 MG: 325 TABLET ORAL at 11:13

## 2018-08-29 RX ADMIN — METRONIDAZOLE 500 MG: 500 INJECTION, SOLUTION INTRAVENOUS at 11:06

## 2018-08-29 RX ADMIN — GUAIFENESIN 400 MG: 200 TABLET ORAL at 15:02

## 2018-08-29 RX ADMIN — METRONIDAZOLE 500 MG: 500 INJECTION, SOLUTION INTRAVENOUS at 03:09

## 2018-08-29 RX ADMIN — Medication 1 MG: at 15:03

## 2018-08-29 RX ADMIN — ONDANSETRON HYDROCHLORIDE 4 MG: 2 INJECTION, SOLUTION INTRAMUSCULAR; INTRAVENOUS at 03:21

## 2018-08-29 RX ADMIN — TAMSULOSIN HYDROCHLORIDE 0.4 MG: 0.4 CAPSULE ORAL at 11:13

## 2018-08-29 RX ADMIN — HYDROCHLOROTHIAZIDE 12.5 MG: 12.5 CAPSULE ORAL at 08:36

## 2018-08-29 RX ADMIN — ENOXAPARIN SODIUM 40 MG: 40 INJECTION SUBCUTANEOUS at 08:37

## 2018-08-29 RX ADMIN — Medication 1 MG: at 11:05

## 2018-08-29 RX ADMIN — METRONIDAZOLE 500 MG: 500 INJECTION, SOLUTION INTRAVENOUS at 21:12

## 2018-08-29 RX ADMIN — GUAIFENESIN 400 MG: 200 TABLET ORAL at 21:12

## 2018-08-29 RX ADMIN — Medication 1 MG: at 08:19

## 2018-08-29 RX ADMIN — ONDANSETRON HYDROCHLORIDE 4 MG: 2 INJECTION, SOLUTION INTRAMUSCULAR; INTRAVENOUS at 11:05

## 2018-08-29 RX ADMIN — LAMOTRIGINE 200 MG: 100 TABLET ORAL at 08:36

## 2018-08-29 RX ADMIN — GUAIFENESIN 400 MG: 200 TABLET ORAL at 08:45

## 2018-08-29 ASSESSMENT — PAIN SCALES - GENERAL
PAINLEVEL_OUTOF10: 3
PAINLEVEL_OUTOF10: 7
PAINLEVEL_OUTOF10: 2
PAINLEVEL_OUTOF10: 7
PAINLEVEL_OUTOF10: 3
PAINLEVEL_OUTOF10: 7
PAINLEVEL_OUTOF10: 3
PAINLEVEL_OUTOF10: 7
PAINLEVEL_OUTOF10: 5
PAINLEVEL_OUTOF10: 3
PAINLEVEL_OUTOF10: 7

## 2018-08-29 ASSESSMENT — PAIN DESCRIPTION - LOCATION: LOCATION: ABDOMEN

## 2018-08-29 ASSESSMENT — PAIN DESCRIPTION - PAIN TYPE: TYPE: ACUTE PAIN

## 2018-08-29 NOTE — PROGRESS NOTES
premix  500 mg Intravenous Q8H Todd Babcock  mL/hr at 08/29/18 1106 500 mg at 08/29/18 1106    0.9 % sodium chloride infusion   Intravenous Continuous Todd Babcock  mL/hr at 08/28/18 2158      sodium chloride flush 0.9 % injection 10 mL  10 mL Intravenous 2 times per day Zandra Sanabria MD   10 mL at 08/28/18 2159    sodium chloride flush 0.9 % injection 10 mL  10 mL Intravenous PRN Zandra Sanabria MD        acetaminophen (TYLENOL) tablet 650 mg  650 mg Oral Q4H PRN Zandra Sanabria MD   650 mg at 08/29/18 1113    enoxaparin (LOVENOX) injection 40 mg  40 mg Subcutaneous Daily Zandra Sanabria MD   40 mg at 08/29/18 7055    morphine PCA 1 mg/mL  2 mg Intravenous Q2H PRN Zandra Sanabria MD   2 mg at 08/28/18 1235    ondansetron (ZOFRAN) injection 4 mg  4 mg Intravenous Q4H PRN Zandra Sanabria MD   4 mg at 08/29/18 1105    nicotine (NICODERM CQ) 21 MG/24HR 1 patch  1 patch Transdermal Daily Zandra Sanabria MD   1 patch at 08/29/18 0841    HYDROmorphone (DILAUDID) 0.5-0.9 MG/ML-% injection 0.5 mg  0.5 mg Intravenous Q2H PRN Zandra Sanabria MD   0.5 mg at 08/28/18 1409    guaiFENesin tablet 400 mg  400 mg Oral Q4H PRN Cody Espino, APRN   400 mg at 08/29/18 0845    lamoTRIgine (LAMICTAL) tablet 200 mg  200 mg Oral Daily Cody Gona, APRN   200 mg at 08/29/18 0836    pantoprazole (PROTONIX) tablet 40 mg  40 mg Oral QAM AC Cody Espino, APRN   40 mg at 08/29/18 0615    buPROPion (WELLBUTRIN XL) extended release tablet 300 mg  300 mg Oral Daily Cody Espino, APRN        losartan (COZAAR) tablet 50 mg  50 mg Oral Daily Cody Kenzie, APRN   50 mg at 08/29/18 9024    And    hydrochlorothiazide (MICROZIDE) capsule 12.5 mg  12.5 mg Oral Daily GUILLAUME Ivey   12.5 mg at 08/29/18 0836    HYDROmorphone (DILAUDID) 0.5-0.9 MG/ML-% injection 1 mg  1 mg Intravenous Q2H PRN Zandra Sanabria MD   1 mg at 08/29/18 1105        Labs:     Recent Labs      08/27/18   2344   WBC  10.6 RBC  4.57*   HGB  13.4*   HCT  41.4*   MCV  90.6   MCH  29.3   MCHC  32.4*   PLT  205     Recent Labs      08/27/18   2344   NA  141   K  3.8   ANIONGAP  11   CL  101   CO2  29   BUN  15   CREATININE  1.2   GLUCOSE  126*   CALCIUM  9.3       Recent Labs      08/27/18   2344   AST  22   ALT  18   BILITOT  <0.2   ALKPHOS  78         Objective:   Vitals: /75   Pulse 63   Temp 98.4 °F (36.9 °C) (Temporal)   Resp 20   Ht 6' 4\" (1.93 m)   Wt 188 lb 3.2 oz (85.4 kg)   SpO2 98%   BMI 22.91 kg/m²   24HR INTAKE/OUTPUT:    Intake/Output Summary (Last 24 hours) at 08/29/18 1245  Last data filed at 08/29/18 0411   Gross per 24 hour   Intake             1600 ml   Output             1600 ml   Net                0 ml     General appearance: alert and cooperative with exam  HEENT: atraumatic, eyes with clear conjunctiva and normal lids, pupils and irises normal, external ears normal, lips normal.  Neck without masses, lympadenopathy, bruit, thyroid normal  Lungs: no increased work of breathing, clear to auscultation bilaterally  Heart: regular rate and rhythm, S1, S2 normal, no murmur, click, rub or gallop  Abdomen: soft with mild tenderness on left side without guarding; active bowel sounds  Extremities: extremities normal, atraumatic, no cyanosis or edema  Neurologic: No focal neurologic deficits, normal sensation, alert and oriented, affect and mood appropriate. Skin: no rashes, nodules. Assessment and Plan:     Principal Problem:    Diverticulitis of large intestine with perforation without abscess or bleeding - per General Surgery    Active Problems:    Essential hypertension - Controlled    Bipolar 2 disorder, major depressive episode (HCC) - Wellbutrin, Ativan    Pelvic floor dysfunction - Allred at present. Started on Flomax    Gastroesophageal reflux disease without esophagitis - PPI          Advance Directive: Full Code    DVT prophylaxis: Lovenox    Discharge planning: TBD.  Stable from medical standpoint, will continue to follow blood pressure. GUILLAUME Trejo    ------------------------------------------------------------------------  I have independently interviewed and examined Eva Vaughn. I have discussed key elements of the care plan with the PA or GUILLAUME and I agree with the findings and care plan as stated above unless otherwise noted.       Additional Comments:     Chief complaint: Abdominal pain      Objective:  VS as above  General appearance: alert and cooperative with exam  HEENT: atraumatic, eyes with clear conjunctiva and normal lids, pupils and irises normal, external ears normal, lips normal.  Neck without masses, lympadenopathy, bruit, thyroid normal  Lungs: no increased work of breathing, clear to auscultation bilaterally  Heart: regular rate and rhythm, S1, S2 normal, no murmur, click, rub or gallop  Abdomen: soft with mild tenderness on left side without guarding; active bowel sounds    Impression / Plan:  Change ativan from q8h to TID  Add flomax  Continue other Rx at current regimen    Electronically signed by Vikki Cordova MD on 8/29/2018 at 1:09 PM

## 2018-08-29 NOTE — PLAN OF CARE
Problem: Pain:  Goal: Pain level will decrease  Pain level will decrease   Outcome: Ongoing    Goal: Control of acute pain  Control of acute pain   Outcome: Ongoing      Problem:  Bowel/Gastric:  Goal: Bowel function will improve  Bowel function will improve   Outcome: Ongoing    Goal: Complications related to the disease process, condition or treatment will be avoided or minimized  Complications related to the disease process, condition or treatment will be avoided or minimized   Outcome: Ongoing

## 2018-08-29 NOTE — PROGRESS NOTES
Citlali Orlando M.D. [facility]  Daily Progress Note    Pt Name: [address] [place] [address] Record Number: [id number]  Date of Birth 1965   Today's Date: 8/29/2018    Chief Complaint:  Chief Complaint   Patient presents with    Abdominal Pain     Started around 2000 described as sharp, stabbing and sensitive in left lower Quadrant. SUBJECTIVE:     Asif Martins is doing well. Pain better on Dilaudid. Still a lot of nausea.   Try phenergan    Significantly less tender today    MEDS:     Scheduled Meds:   cefTRIAXone (ROCEPHIN) IV  1 g Intravenous Q24H    metroNIDAZOLE  500 mg Intravenous Q8H    sodium chloride flush  10 mL Intravenous 2 times per day    enoxaparin  40 mg Subcutaneous Daily    nicotine  1 patch Transdermal Daily    lamoTRIgine  200 mg Oral Daily    pantoprazole  40 mg Oral QAM AC    buPROPion  300 mg Oral Daily    losartan  50 mg Oral Daily    And    hydrochlorothiazide  12.5 mg Oral Daily     Continuous Infusions:   sodium chloride 100 mL/hr at 08/28/18 2158     PRN Meds:  sodium chloride flush 10 mL PRN   acetaminophen 650 mg Q4H PRN   morphine 2 mg Q2H PRN   ondansetron 4 mg Q4H PRN   HYDROmorphone 0.5 mg Q2H PRN   guaiFENesin 400 mg Q4H PRN   LORazepam 0.5 mg Q8H PRN   HYDROmorphone 1 mg Q2H PRN       OBJECTIVE:     Patient Vitals for the past 24 hrs:   BP Temp Temp src Pulse Resp SpO2   08/29/18 0614 133/80 98.6 °F (37 °C) Temporal 66 18 96 %   08/29/18 0411 (!) 100/50 96.8 °F (36 °C) Temporal 68 16 97 %   08/29/18 0036 114/74 97.8 °F (36.6 °C) Temporal 63 16 97 %   08/28/18 1510 (!) 146/80 99.6 °F (37.6 °C) Oral 82 18 96 %   08/28/18 1255 - 100.2 °F (37.9 °C) Oral - - -   08/28/18 1010 108/60 100.1 °F (37.8 °C) Temporal 84 18 95 %         Intake/Output Summary (Last 24 hours) at 08/29/18 0844  Last data filed at 08/29/18 0411   Gross per 24 hour   Intake             1600 ml   Output             1600 ml   Net                0 ml       In: 600 [P.O.:600]  Out: 1000 [[facility]:3950]    I/O last 3 completed shifts: In: 1600 [P.O.:800; I.V.:800]  Out: 1600 [Urine:1600]       Date 08/29/18 0000 - 08/29/18 2359   Shift 0000-0759 2419-8385 9242-3056 24 Hour Total   I  N  T  A  K  E   P.O.  (mL/kg/hr) 200  (0.3)   200    Shift Total  (mL/kg) 200  (2.3)   200  (2.3)   O  U  T  P  U  T   Urine  (mL/kg/hr) 400  (0.6)   400    Shift Total  (mL/kg) 400  (4.7)   400  (4.7)   Weight (kg) 85.4 85.4 85.4 85.4       Wt Readings from Last 3 Encounters:   08/28/18 188 lb 3.2 oz (85.4 kg)   02/10/18 185 lb (83.9 kg)   01/02/18 182 lb 3.2 oz (82.6 kg)        Body mass index is 22.91 kg/m². Diet: DIET CLEAR LIQUID;    LABS:     CBC: Recent Labs      08/27/18   2344   WBC  10.6   RBC  4.57*   HGB  13.4*   HCT  41.4*   MCV  90.6   MCH  29.3   MCHC  32.4*   RDW  12.8   PLT  205   MPV  10.2      Last 3 CMP:   Recent Labs      08/27/18   2344   NA  141   K  3.8   CL  101   CO2  29   BUN  15   CREATININE  1.2   GLUCOSE  126*   CALCIUM  9.3   PROT  6.0*   LABALBU  4.0   BILITOT  <0.2   ALKPHOS  78   AST  22   ALT  18          PHYSICAL EXAM:     CONSTITUTIONAL: Alert, appropriate, no acute distress  SKIN: warm, dry with no rashes or lesions  EYES: Non icteric  CHEST/LUNGS: CTA bilaterally  CARDIOVASCULAR: RRR    ABDOMEN: soft, ND, appropriately TTP, +BS  NEUROLOGIC: CN II-XI grossly intact, no motor or sensory deficits   EXTREMITIES: warm, well perfused, no edema     ASSESSMENT:       1. HD # 1  Patient Active Problem List   Diagnosis    Bipolar 2 disorder, major depressive episode (HCC)    Constipation    Pelvic floor dysfunction    History of colon polyps    Diverticulitis    Essential hypertension    Gastroesophageal reflux disease without esophagitis    Diverticulitis of large intestine with perforation without abscess or bleeding   2. PLAN:     1. Try  phenergan  2. Continue abx and clears  3. Dae Lizama M.D.  FACS  Electronically signed 8/29/2018 at 8:44

## 2018-08-30 LAB
ALBUMIN SERPL-MCNC: 3.3 G/DL (ref 3.5–5.2)
ALP BLD-CCNC: 62 U/L (ref 40–130)
ALT SERPL-CCNC: 14 U/L (ref 5–41)
ANION GAP SERPL CALCULATED.3IONS-SCNC: 13 MMOL/L (ref 7–19)
AST SERPL-CCNC: 15 U/L (ref 5–40)
BASOPHILS ABSOLUTE: 0 K/UL (ref 0–0.2)
BASOPHILS RELATIVE PERCENT: 0.2 % (ref 0–1)
BILIRUB SERPL-MCNC: <0.2 MG/DL (ref 0.2–1.2)
BUN BLDV-MCNC: 11 MG/DL (ref 6–20)
CALCIUM SERPL-MCNC: 8.4 MG/DL (ref 8.6–10)
CHLORIDE BLD-SCNC: 100 MMOL/L (ref 98–111)
CO2: 27 MMOL/L (ref 22–29)
CREAT SERPL-MCNC: 0.9 MG/DL (ref 0.5–1.2)
EOSINOPHILS ABSOLUTE: 0 K/UL (ref 0–0.6)
EOSINOPHILS RELATIVE PERCENT: 0.2 % (ref 0–5)
GFR NON-AFRICAN AMERICAN: >60
GLUCOSE BLD-MCNC: 89 MG/DL (ref 74–109)
HCT VFR BLD CALC: 37.1 % (ref 42–52)
HEMOGLOBIN: 12 G/DL (ref 14–18)
LYMPHOCYTES ABSOLUTE: 0.7 K/UL (ref 1.1–4.5)
LYMPHOCYTES RELATIVE PERCENT: 5.5 % (ref 20–40)
MCH RBC QN AUTO: 29.2 PG (ref 27–31)
MCHC RBC AUTO-ENTMCNC: 32.3 G/DL (ref 33–37)
MCV RBC AUTO: 90.3 FL (ref 80–94)
MONOCYTES ABSOLUTE: 0.9 K/UL (ref 0–0.9)
MONOCYTES RELATIVE PERCENT: 7.1 % (ref 0–10)
NEUTROPHILS ABSOLUTE: 11 K/UL (ref 1.5–7.5)
NEUTROPHILS RELATIVE PERCENT: 85.9 % (ref 50–65)
PDW BLD-RTO: 12.5 % (ref 11.5–14.5)
PLATELET # BLD: 162 K/UL (ref 130–400)
PMV BLD AUTO: 10.8 FL (ref 9.4–12.4)
POTASSIUM SERPL-SCNC: 3.7 MMOL/L (ref 3.5–5)
RBC # BLD: 4.11 M/UL (ref 4.7–6.1)
SODIUM BLD-SCNC: 140 MMOL/L (ref 136–145)
TOTAL PROTEIN: 5.1 G/DL (ref 6.6–8.7)
WBC # BLD: 12.8 K/UL (ref 4.8–10.8)

## 2018-08-30 PROCEDURE — 80053 COMPREHEN METABOLIC PANEL: CPT

## 2018-08-30 PROCEDURE — 6370000000 HC RX 637 (ALT 250 FOR IP): Performed by: SURGERY

## 2018-08-30 PROCEDURE — 99231 SBSQ HOSP IP/OBS SF/LOW 25: CPT | Performed by: NURSE PRACTITIONER

## 2018-08-30 PROCEDURE — 6360000002 HC RX W HCPCS: Performed by: SURGERY

## 2018-08-30 PROCEDURE — 99231 SBSQ HOSP IP/OBS SF/LOW 25: CPT | Performed by: SURGERY

## 2018-08-30 PROCEDURE — 6370000000 HC RX 637 (ALT 250 FOR IP): Performed by: NURSE PRACTITIONER

## 2018-08-30 PROCEDURE — 6360000002 HC RX W HCPCS: Performed by: EMERGENCY MEDICINE

## 2018-08-30 PROCEDURE — 2580000003 HC RX 258: Performed by: SURGERY

## 2018-08-30 PROCEDURE — 85025 COMPLETE CBC W/AUTO DIFF WBC: CPT

## 2018-08-30 PROCEDURE — 2500000003 HC RX 250 WO HCPCS: Performed by: EMERGENCY MEDICINE

## 2018-08-30 PROCEDURE — 6370000000 HC RX 637 (ALT 250 FOR IP): Performed by: INTERNAL MEDICINE

## 2018-08-30 PROCEDURE — 36415 COLL VENOUS BLD VENIPUNCTURE: CPT

## 2018-08-30 PROCEDURE — 2580000003 HC RX 258: Performed by: EMERGENCY MEDICINE

## 2018-08-30 PROCEDURE — 1210000000 HC MED SURG R&B

## 2018-08-30 RX ADMIN — LORAZEPAM 0.5 MG: 0.5 TABLET ORAL at 21:27

## 2018-08-30 RX ADMIN — CEFTRIAXONE 1 G: 1 INJECTION, POWDER, FOR SOLUTION INTRAMUSCULAR; INTRAVENOUS at 23:21

## 2018-08-30 RX ADMIN — BUPROPION HYDROCHLORIDE 300 MG: 150 TABLET, FILM COATED, EXTENDED RELEASE ORAL at 21:29

## 2018-08-30 RX ADMIN — GUAIFENESIN 400 MG: 200 TABLET ORAL at 18:38

## 2018-08-30 RX ADMIN — HYDROCHLOROTHIAZIDE 12.5 MG: 12.5 CAPSULE ORAL at 08:49

## 2018-08-30 RX ADMIN — LORAZEPAM 0.5 MG: 0.5 TABLET ORAL at 14:04

## 2018-08-30 RX ADMIN — SODIUM CHLORIDE: 9 INJECTION, SOLUTION INTRAVENOUS at 08:49

## 2018-08-30 RX ADMIN — CEFTRIAXONE 1 G: 1 INJECTION, POWDER, FOR SOLUTION INTRAMUSCULAR; INTRAVENOUS at 00:28

## 2018-08-30 RX ADMIN — METRONIDAZOLE 500 MG: 500 INJECTION, SOLUTION INTRAVENOUS at 18:29

## 2018-08-30 RX ADMIN — GUAIFENESIN 400 MG: 200 TABLET ORAL at 08:49

## 2018-08-30 RX ADMIN — METRONIDAZOLE 500 MG: 500 INJECTION, SOLUTION INTRAVENOUS at 04:16

## 2018-08-30 RX ADMIN — Medication 1 MG: at 10:46

## 2018-08-30 RX ADMIN — ENOXAPARIN SODIUM 40 MG: 40 INJECTION SUBCUTANEOUS at 08:50

## 2018-08-30 RX ADMIN — Medication 1 MG: at 23:21

## 2018-08-30 RX ADMIN — SODIUM CHLORIDE: 9 INJECTION, SOLUTION INTRAVENOUS at 18:24

## 2018-08-30 RX ADMIN — TAMSULOSIN HYDROCHLORIDE 0.4 MG: 0.4 CAPSULE ORAL at 08:49

## 2018-08-30 RX ADMIN — GUAIFENESIN 400 MG: 200 TABLET ORAL at 14:41

## 2018-08-30 RX ADMIN — PANTOPRAZOLE SODIUM 40 MG: 40 TABLET, DELAYED RELEASE ORAL at 05:09

## 2018-08-30 RX ADMIN — Medication 10 ML: at 21:27

## 2018-08-30 RX ADMIN — METRONIDAZOLE 500 MG: 500 INJECTION, SOLUTION INTRAVENOUS at 11:27

## 2018-08-30 RX ADMIN — LAMOTRIGINE 200 MG: 100 TABLET ORAL at 08:49

## 2018-08-30 RX ADMIN — Medication 1 MG: at 18:31

## 2018-08-30 RX ADMIN — LORAZEPAM 0.5 MG: 0.5 TABLET ORAL at 08:49

## 2018-08-30 RX ADMIN — PROMETHAZINE HYDROCHLORIDE 12.5 MG: 25 INJECTION INTRAMUSCULAR; INTRAVENOUS at 19:13

## 2018-08-30 RX ADMIN — LOSARTAN POTASSIUM 50 MG: 50 TABLET ORAL at 08:50

## 2018-08-30 RX ADMIN — Medication 1 MG: at 14:28

## 2018-08-30 ASSESSMENT — PAIN SCALES - GENERAL
PAINLEVEL_OUTOF10: 6
PAINLEVEL_OUTOF10: 5
PAINLEVEL_OUTOF10: 5
PAINLEVEL_OUTOF10: 7
PAINLEVEL_OUTOF10: 4
PAINLEVEL_OUTOF10: 7

## 2018-08-30 NOTE — CARE COORDINATION
Pt resides at home with spouse/significant other. Pt did not have any services prior to hospital admission. Pt does not need assistance with medication coverage at this time. Pt plan is to DC home to same support system when medically cleared from the hospital.  Will continue to monitor Pt status and update as needed.  Electronically signed by Jane Estrada on 8/30/2018 at 2:42 PM
I will START or STAY ON the medications listed below when I get home from the hospital:    Keppra 100 mg/mL oral solution  -- 0.5 mL twice per day for one week, then 0.5 mL in the morning and 1.0 mL at night ONLY IF ANOTHER SEIZURE OCCURS  -- Check with your doctor before becoming pregnant.  It is very important that you take or use this exactly as directed.  Do not skip doses or discontinue unless directed by your doctor.  May cause drowsiness or dizziness.  Obtain medical advice before taking any non-prescription drugs as some may affect the action of this medication.  This drug may impair the ability to drive or operate machinery.  Use care until you become familiar with its effects.    -- Indication: For Seizure    raNITIdine 15 mg/mL oral syrup  -- 2 milliliter(s) by mouth 2 times a day  -- Indication: For GERD (gastroesophageal reflux disease)

## 2018-08-30 NOTE — PLAN OF CARE
Problem: Pain:  Goal: Pain level will decrease  Pain level will decrease   Outcome: Ongoing    Goal: Control of acute pain  Control of acute pain   Outcome: Ongoing      Problem:  Bowel/Gastric:  Goal: Bowel function will improve  Bowel function will improve   Outcome: Ongoing    Goal: Complications related to the disease process, condition or treatment will be avoided or minimized  Complications related to the disease process, condition or treatment will be avoided or minimized   Outcome: Ongoing      Problem: Falls - Risk of:  Goal: Will remain free from falls  Will remain free from falls   Outcome: Ongoing    Goal: Absence of physical injury  Absence of physical injury   Outcome: Ongoing

## 2018-08-30 NOTE — PROGRESS NOTES
Bipolar 2 disorder, major depressive episode (HCC) - Wellbutrin, Ativan    Pelvic floor dysfunction - Allred at present. Started on Flomax    Gastroesophageal reflux disease without esophagitis - PPI          Advance Directive: Full Code    DVT prophylaxis: Lovenox    Discharge planning: TBD. Stable from medical standpoint. Call if needed.       Hoda Quezada, APRN

## 2018-08-31 LAB
ALBUMIN SERPL-MCNC: 2.8 G/DL (ref 3.5–5.2)
ALP BLD-CCNC: 58 U/L (ref 40–130)
ALT SERPL-CCNC: 13 U/L (ref 5–41)
ANION GAP SERPL CALCULATED.3IONS-SCNC: 12 MMOL/L (ref 7–19)
AST SERPL-CCNC: 21 U/L (ref 5–40)
BASOPHILS ABSOLUTE: 0 K/UL (ref 0–0.2)
BASOPHILS RELATIVE PERCENT: 0.2 % (ref 0–1)
BILIRUB SERPL-MCNC: <0.2 MG/DL (ref 0.2–1.2)
BUN BLDV-MCNC: 8 MG/DL (ref 6–20)
CALCIUM SERPL-MCNC: 8.2 MG/DL (ref 8.6–10)
CHLORIDE BLD-SCNC: 101 MMOL/L (ref 98–111)
CO2: 27 MMOL/L (ref 22–29)
CREAT SERPL-MCNC: 0.8 MG/DL (ref 0.5–1.2)
EOSINOPHILS ABSOLUTE: 0.1 K/UL (ref 0–0.6)
EOSINOPHILS RELATIVE PERCENT: 1.1 % (ref 0–5)
GFR NON-AFRICAN AMERICAN: >60
GLUCOSE BLD-MCNC: 112 MG/DL (ref 74–109)
HCT VFR BLD CALC: 37.4 % (ref 42–52)
HEMOGLOBIN: 12 G/DL (ref 14–18)
LYMPHOCYTES ABSOLUTE: 1.1 K/UL (ref 1.1–4.5)
LYMPHOCYTES RELATIVE PERCENT: 10.9 % (ref 20–40)
MCH RBC QN AUTO: 28.9 PG (ref 27–31)
MCHC RBC AUTO-ENTMCNC: 32.1 G/DL (ref 33–37)
MCV RBC AUTO: 90.1 FL (ref 80–94)
MONOCYTES ABSOLUTE: 0.9 K/UL (ref 0–0.9)
MONOCYTES RELATIVE PERCENT: 9.6 % (ref 0–10)
NEUTROPHILS ABSOLUTE: 7.5 K/UL (ref 1.5–7.5)
NEUTROPHILS RELATIVE PERCENT: 77.7 % (ref 50–65)
PDW BLD-RTO: 12.5 % (ref 11.5–14.5)
PLATELET # BLD: 175 K/UL (ref 130–400)
PMV BLD AUTO: 10.4 FL (ref 9.4–12.4)
POTASSIUM SERPL-SCNC: 3.3 MMOL/L (ref 3.5–5)
RBC # BLD: 4.15 M/UL (ref 4.7–6.1)
SODIUM BLD-SCNC: 140 MMOL/L (ref 136–145)
TOTAL PROTEIN: 5.4 G/DL (ref 6.6–8.7)
WBC # BLD: 9.6 K/UL (ref 4.8–10.8)

## 2018-08-31 PROCEDURE — 99231 SBSQ HOSP IP/OBS SF/LOW 25: CPT | Performed by: SURGERY

## 2018-08-31 PROCEDURE — 6360000002 HC RX W HCPCS: Performed by: EMERGENCY MEDICINE

## 2018-08-31 PROCEDURE — 2580000003 HC RX 258: Performed by: EMERGENCY MEDICINE

## 2018-08-31 PROCEDURE — 85025 COMPLETE CBC W/AUTO DIFF WBC: CPT

## 2018-08-31 PROCEDURE — 6370000000 HC RX 637 (ALT 250 FOR IP): Performed by: SURGERY

## 2018-08-31 PROCEDURE — 36415 COLL VENOUS BLD VENIPUNCTURE: CPT

## 2018-08-31 PROCEDURE — 2700000000 HC OXYGEN THERAPY PER DAY

## 2018-08-31 PROCEDURE — 99232 SBSQ HOSP IP/OBS MODERATE 35: CPT | Performed by: INTERNAL MEDICINE

## 2018-08-31 PROCEDURE — 6370000000 HC RX 637 (ALT 250 FOR IP): Performed by: NURSE PRACTITIONER

## 2018-08-31 PROCEDURE — 6370000000 HC RX 637 (ALT 250 FOR IP): Performed by: INTERNAL MEDICINE

## 2018-08-31 PROCEDURE — 2500000003 HC RX 250 WO HCPCS: Performed by: EMERGENCY MEDICINE

## 2018-08-31 PROCEDURE — 1210000000 HC MED SURG R&B

## 2018-08-31 PROCEDURE — 80053 COMPREHEN METABOLIC PANEL: CPT

## 2018-08-31 PROCEDURE — 6360000002 HC RX W HCPCS: Performed by: SURGERY

## 2018-08-31 RX ORDER — LOSARTAN POTASSIUM 50 MG/1
50 TABLET ORAL ONCE
Status: COMPLETED | OUTPATIENT
Start: 2018-08-31 | End: 2018-08-31

## 2018-08-31 RX ORDER — LOSARTAN POTASSIUM 100 MG/1
100 TABLET ORAL DAILY
Status: DISCONTINUED | OUTPATIENT
Start: 2018-09-01 | End: 2018-09-01 | Stop reason: HOSPADM

## 2018-08-31 RX ORDER — POTASSIUM CHLORIDE 20 MEQ/1
40 TABLET, EXTENDED RELEASE ORAL 2 TIMES DAILY
Status: COMPLETED | OUTPATIENT
Start: 2018-08-31 | End: 2018-08-31

## 2018-08-31 RX ORDER — HYDROCHLOROTHIAZIDE 12.5 MG/1
12.5 CAPSULE, GELATIN COATED ORAL DAILY
Status: DISCONTINUED | OUTPATIENT
Start: 2018-09-01 | End: 2018-09-01 | Stop reason: HOSPADM

## 2018-08-31 RX ADMIN — Medication 1 MG: at 18:30

## 2018-08-31 RX ADMIN — Medication 1 MG: at 03:21

## 2018-08-31 RX ADMIN — LAMOTRIGINE 200 MG: 100 TABLET ORAL at 08:21

## 2018-08-31 RX ADMIN — SODIUM CHLORIDE: 9 INJECTION, SOLUTION INTRAVENOUS at 08:19

## 2018-08-31 RX ADMIN — METRONIDAZOLE 500 MG: 500 INJECTION, SOLUTION INTRAVENOUS at 03:11

## 2018-08-31 RX ADMIN — GUAIFENESIN 400 MG: 200 TABLET ORAL at 23:40

## 2018-08-31 RX ADMIN — Medication 1 MG: at 12:02

## 2018-08-31 RX ADMIN — TAMSULOSIN HYDROCHLORIDE 0.4 MG: 0.4 CAPSULE ORAL at 08:21

## 2018-08-31 RX ADMIN — GUAIFENESIN 400 MG: 200 TABLET ORAL at 08:32

## 2018-08-31 RX ADMIN — Medication 1 MG: at 20:26

## 2018-08-31 RX ADMIN — HYDROCHLOROTHIAZIDE 12.5 MG: 12.5 CAPSULE ORAL at 08:29

## 2018-08-31 RX ADMIN — POTASSIUM CHLORIDE 40 MEQ: 20 TABLET, EXTENDED RELEASE ORAL at 10:48

## 2018-08-31 RX ADMIN — LORAZEPAM 0.5 MG: 0.5 TABLET ORAL at 20:17

## 2018-08-31 RX ADMIN — LOSARTAN POTASSIUM 50 MG: 50 TABLET ORAL at 14:02

## 2018-08-31 RX ADMIN — CEFTRIAXONE 1 G: 1 INJECTION, POWDER, FOR SOLUTION INTRAMUSCULAR; INTRAVENOUS at 23:40

## 2018-08-31 RX ADMIN — LORAZEPAM 0.5 MG: 0.5 TABLET ORAL at 14:02

## 2018-08-31 RX ADMIN — SODIUM CHLORIDE: 9 INJECTION, SOLUTION INTRAVENOUS at 20:17

## 2018-08-31 RX ADMIN — LORAZEPAM 0.5 MG: 0.5 TABLET ORAL at 08:22

## 2018-08-31 RX ADMIN — GUAIFENESIN 400 MG: 200 TABLET ORAL at 18:30

## 2018-08-31 RX ADMIN — METRONIDAZOLE 500 MG: 500 INJECTION, SOLUTION INTRAVENOUS at 18:30

## 2018-08-31 RX ADMIN — LOSARTAN POTASSIUM 50 MG: 50 TABLET ORAL at 08:21

## 2018-08-31 RX ADMIN — ENOXAPARIN SODIUM 40 MG: 40 INJECTION SUBCUTANEOUS at 08:21

## 2018-08-31 RX ADMIN — GUAIFENESIN 400 MG: 200 TABLET ORAL at 14:02

## 2018-08-31 RX ADMIN — BUPROPION HYDROCHLORIDE 300 MG: 150 TABLET, FILM COATED, EXTENDED RELEASE ORAL at 20:17

## 2018-08-31 RX ADMIN — POTASSIUM CHLORIDE 40 MEQ: 20 TABLET, EXTENDED RELEASE ORAL at 20:17

## 2018-08-31 RX ADMIN — PANTOPRAZOLE SODIUM 40 MG: 40 TABLET, DELAYED RELEASE ORAL at 08:29

## 2018-08-31 RX ADMIN — Medication 1 MG: at 09:47

## 2018-08-31 RX ADMIN — Medication 1 MG: at 23:45

## 2018-08-31 RX ADMIN — METRONIDAZOLE 500 MG: 500 INJECTION, SOLUTION INTRAVENOUS at 10:47

## 2018-08-31 ASSESSMENT — PAIN DESCRIPTION - LOCATION
LOCATION: ABDOMEN

## 2018-08-31 ASSESSMENT — PAIN SCALES - GENERAL
PAINLEVEL_OUTOF10: 7
PAINLEVEL_OUTOF10: 7
PAINLEVEL_OUTOF10: 4
PAINLEVEL_OUTOF10: 7
PAINLEVEL_OUTOF10: 4
PAINLEVEL_OUTOF10: 7
PAINLEVEL_OUTOF10: 5

## 2018-08-31 ASSESSMENT — PAIN DESCRIPTION - ORIENTATION: ORIENTATION: LEFT;LOWER

## 2018-08-31 ASSESSMENT — PAIN DESCRIPTION - PAIN TYPE
TYPE: ACUTE PAIN

## 2018-08-31 NOTE — PROGRESS NOTES
Hospitalist Progress Note  8/31/2018 1:37 PM  Subjective:   Admit Date: 8/27/2018  PCP: Kvng Segura MD    Chief Complaint: Abdominal Pain, Nausea    Subjective: Patient seen and examined. No acute events overnight. BP increased,  No new complaints. Review of Systems:   Constitutional: Denies fever or chills. Denies change in energy level or malaise. HEENT: Denies headaches or visual disturbances. Denies difficulty swallowing or sore throat. Respiratory: Denies cough or hoarseness. Denies shortness of breath. Cardiovascular: Denies chest pain or pressure. Denies palpitations. Denies presyncope/syncope. Denies orthopnea/PND. Denies lower extremity edema. Gastrointestinal:  Denies nausea/vomiting. Denies change in bowel habits or history of recent GI tract blood loss. Genitourinary: Denies urinary urgency or frequency. Denies dysuria, hematuria. Musculoskeletal: Denies pain or swelling in joints. Neurological: Denies paresthesias. Denies headache. Denies seizure or stroke symptoms. Behavioral/Psych: Denies problems with anxiety or depression. All other ROS negative except where stated above. DIET LOW FIBER;     Intake/Output Summary (Last 24 hours) at 08/31/18 1337  Last data filed at 08/31/18 1050   Gross per 24 hour   Intake          4153.17 ml   Output             5750 ml   Net         -1596.83 ml     Medications:   sodium chloride 100 mL/hr at 08/31/18 5549     Current Facility-Administered Medications   Medication Dose Route Frequency Provider Last Rate Last Dose    potassium chloride (KLOR-CON M) extended release tablet 40 mEq  40 mEq Oral BID Amairani Bolanos MD   40 mEq at 08/31/18 1048    [START ON 9/1/2018] losartan (COZAAR) tablet 100 mg  100 mg Oral Daily Lucia Montero MD        And    [START ON 9/1/2018] hydrochlorothiazide (MICROZIDE) capsule 12.5 mg  12.5 mg Oral Daily Lucia Montero MD        losartan (COZAAR) tablet 50 mg  50 mg Oral Once Lucia Montero MD  LORazepam (ATIVAN) tablet 0.5 mg  0.5 mg Oral TID Sofia Deluna MD   0.5 mg at 08/31/18 1464    tamsulosin (FLOMAX) capsule 0.4 mg  0.4 mg Oral Daily Sofia Deluna MD   0.4 mg at 08/31/18 2400    promethazine (PHENERGAN) injection 12.5 mg  12.5 mg Intravenous Q6H PRN Carmita Webb MD   12.5 mg at 08/30/18 1913    cefTRIAXone (ROCEPHIN) 1 g in sterile water 10 mL IV syringe  1 g Intravenous Q24H Karl Barbosa MD 0 mL/hr at 08/28/18 0258 1 g at 08/30/18 2321    metronidazole (FLAGYL) 500 mg in NaCl 100 mL IVPB premix  500 mg Intravenous Q8H Karl Barbosa MD   Stopped at 08/31/18 1147    0.9 % sodium chloride infusion   Intravenous Continuous Karl Barbosa  mL/hr at 08/31/18 0819      sodium chloride flush 0.9 % injection 10 mL  10 mL Intravenous 2 times per day Carmita Webb MD   10 mL at 08/30/18 2127    sodium chloride flush 0.9 % injection 10 mL  10 mL Intravenous PRN Carmita Webb MD        acetaminophen (TYLENOL) tablet 650 mg  650 mg Oral Q4H PRN Carmita Webb MD   650 mg at 08/29/18 1113    enoxaparin (LOVENOX) injection 40 mg  40 mg Subcutaneous Daily Carmita Webb MD   40 mg at 08/31/18 3438    morphine PCA 1 mg/mL  2 mg Intravenous Q2H PRN Carmita Webb MD   2 mg at 08/28/18 1235    ondansetron (ZOFRAN) injection 4 mg  4 mg Intravenous Q4H PRN Carmita Webb MD   4 mg at 08/29/18 1105    nicotine (NICODERM CQ) 21 MG/24HR 1 patch  1 patch Transdermal Daily Carmita Webb MD   1 patch at 08/31/18 0824    HYDROmorphone (DILAUDID) 0.5-0.9 MG/ML-% injection 0.5 mg  0.5 mg Intravenous Q2H PRN Carmita Webb MD   0.5 mg at 08/28/18 1409    guaiFENesin tablet 400 mg  400 mg Oral Q4H PRN Mahsa Seek, APRN   400 mg at 08/31/18 5717    lamoTRIgine (LAMICTAL) tablet 200 mg  200 mg Oral Daily Mahsa Seek, APRN   200 mg at 08/31/18 4562    pantoprazole (PROTONIX) tablet 40 mg  40 mg Oral QAM AC Mahsa Seek, APRN   40 mg at 08/31/18 8906    buPROPion (WELLBUTRIN XL) extended release tablet 300 mg  300 mg Oral Daily GUILLAUME Wilson   300 mg at 08/30/18 2129    HYDROmorphone (DILAUDID) 0.5-0.9 MG/ML-% injection 1 mg  1 mg Intravenous Q2H PRN Marquis Flowers MD   1 mg at 08/31/18 1202        Labs:     Recent Labs      08/30/18   0241  08/31/18   0221   WBC  12.8*  9.6   RBC  4.11*  4.15*   HGB  12.0*  12.0*   HCT  37.1*  37.4*   MCV  90.3  90.1   MCH  29.2  28.9   MCHC  32.3*  32.1*   PLT  162  175     Recent Labs      08/30/18   0241  08/31/18   0221   NA  140  140   K  3.7  3.3*   ANIONGAP  13  12   CL  100  101   CO2  27  27   BUN  11  8   CREATININE  0.9  0.8   GLUCOSE  89  112*   CALCIUM  8.4*  8.2*       Recent Labs      08/30/18   0241  08/31/18   0221   AST  15  21   ALT  14  13   BILITOT  <0.2  <0.2   ALKPHOS  62  58         Objective:   Vitals: /75   Pulse 63   Temp 98.4 °F (36.9 °C) (Temporal)   Resp 20   Ht 6' 4\" (1.93 m)   Wt 188 lb 3.2 oz (85.4 kg)   SpO2 98%   BMI 22.91 kg/m²   24HR INTAKE/OUTPUT:      Intake/Output Summary (Last 24 hours) at 08/31/18 1337  Last data filed at 08/31/18 1050   Gross per 24 hour   Intake          4153.17 ml   Output             5750 ml   Net         -1596.83 ml     General appearance: alert and cooperative with exam  HEENT: atraumatic, eyes with clear conjunctiva and normal lids, pupils and irises normal, external ears normal, lips normal.  Neck without masses, lympadenopathy, bruit, thyroid normal  Lungs: no increased work of breathing, clear to auscultation bilaterally  Heart: regular rate and rhythm, S1, S2 normal, no murmur, click, rub or gallop  Abdomen: soft with mild tenderness on left side without guarding; active bowel sounds  Extremities: extremities normal, atraumatic, no cyanosis or edema  Neurologic: No focal neurologic deficits, normal sensation, alert and oriented, affect and mood appropriate. Skin: no rashes, nodules.     Assessment and Plan:     Principal Problem:

## 2018-08-31 NOTE — PROGRESS NOTES
Nutrition Education    Type and Reason for Visit: Initial, Consult    Patient provided with Low Fiber Diet information including food lists and sample menu. · Verbally reviewed following information with Patient: Low Fiber Diet Info  · Written educational materials provided.     Electronically signed by All Reeder RD, LD on 8/31/18 at 12:53 PM

## 2018-08-31 NOTE — PROGRESS NOTES
# 2  Patient Active Problem List   Diagnosis    Bipolar 2 disorder, major depressive episode ([facility] [address]    Constipation    Pelvic floor dysfunction    History of colon polyps    Diverticulitis    Essential hypertension    Gastroesophageal reflux disease without esophagitis    Diverticulitis of large intestine with perforation without abscess or bleeding   2. PLAN:     1. Full liquids ? Low residue tomorrow  2. Genaro Dakin, M.D.  FACS  Electronically signed 8/30/2018 at 9:38 PM

## 2018-08-31 NOTE — PLAN OF CARE
Problem:  Bowel/Gastric:  Goal: Bowel function will improve  Bowel function will improve   Outcome: Ongoing    Goal: Complications related to the disease process, condition or treatment will be avoided or minimized  Complications related to the disease process, condition or treatment will be avoided or minimized   Outcome: Ongoing

## 2018-09-01 VITALS
OXYGEN SATURATION: 98 % | WEIGHT: 188.2 LBS | RESPIRATION RATE: 18 BRPM | SYSTOLIC BLOOD PRESSURE: 138 MMHG | DIASTOLIC BLOOD PRESSURE: 82 MMHG | HEART RATE: 78 BPM | BODY MASS INDEX: 22.92 KG/M2 | TEMPERATURE: 98.5 F | HEIGHT: 76 IN

## 2018-09-01 LAB
ALBUMIN SERPL-MCNC: 2.8 G/DL (ref 3.5–5.2)
ALP BLD-CCNC: 58 U/L (ref 40–130)
ALT SERPL-CCNC: 17 U/L (ref 5–41)
ANION GAP SERPL CALCULATED.3IONS-SCNC: 13 MMOL/L (ref 7–19)
AST SERPL-CCNC: 24 U/L (ref 5–40)
BASOPHILS ABSOLUTE: 0 K/UL (ref 0–0.2)
BASOPHILS RELATIVE PERCENT: 0.2 % (ref 0–1)
BILIRUB SERPL-MCNC: <0.2 MG/DL (ref 0.2–1.2)
BUN BLDV-MCNC: 11 MG/DL (ref 6–20)
CALCIUM SERPL-MCNC: 8.4 MG/DL (ref 8.6–10)
CHLORIDE BLD-SCNC: 101 MMOL/L (ref 98–111)
CO2: 26 MMOL/L (ref 22–29)
CREAT SERPL-MCNC: 0.9 MG/DL (ref 0.5–1.2)
EOSINOPHILS ABSOLUTE: 0.3 K/UL (ref 0–0.6)
EOSINOPHILS RELATIVE PERCENT: 3.2 % (ref 0–5)
GFR NON-AFRICAN AMERICAN: >60
GLUCOSE BLD-MCNC: 130 MG/DL (ref 74–109)
HCT VFR BLD CALC: 35.8 % (ref 42–52)
HEMOGLOBIN: 11.7 G/DL (ref 14–18)
LYMPHOCYTES ABSOLUTE: 1.1 K/UL (ref 1.1–4.5)
LYMPHOCYTES RELATIVE PERCENT: 12.1 % (ref 20–40)
MCH RBC QN AUTO: 29.7 PG (ref 27–31)
MCHC RBC AUTO-ENTMCNC: 32.7 G/DL (ref 33–37)
MCV RBC AUTO: 90.9 FL (ref 80–94)
MONOCYTES ABSOLUTE: 1.1 K/UL (ref 0–0.9)
MONOCYTES RELATIVE PERCENT: 11.8 % (ref 0–10)
NEUTROPHILS ABSOLUTE: 6.5 K/UL (ref 1.5–7.5)
NEUTROPHILS RELATIVE PERCENT: 72.3 % (ref 50–65)
PDW BLD-RTO: 12.6 % (ref 11.5–14.5)
PLATELET # BLD: 188 K/UL (ref 130–400)
PMV BLD AUTO: 10.5 FL (ref 9.4–12.4)
POTASSIUM SERPL-SCNC: 3.7 MMOL/L (ref 3.5–5)
RBC # BLD: 3.94 M/UL (ref 4.7–6.1)
SODIUM BLD-SCNC: 140 MMOL/L (ref 136–145)
TOTAL PROTEIN: 5.1 G/DL (ref 6.6–8.7)
WBC # BLD: 8.9 K/UL (ref 4.8–10.8)

## 2018-09-01 PROCEDURE — 85025 COMPLETE CBC W/AUTO DIFF WBC: CPT

## 2018-09-01 PROCEDURE — 80053 COMPREHEN METABOLIC PANEL: CPT

## 2018-09-01 PROCEDURE — 6370000000 HC RX 637 (ALT 250 FOR IP): Performed by: NURSE PRACTITIONER

## 2018-09-01 PROCEDURE — 6370000000 HC RX 637 (ALT 250 FOR IP): Performed by: SURGERY

## 2018-09-01 PROCEDURE — 6360000002 HC RX W HCPCS: Performed by: SURGERY

## 2018-09-01 PROCEDURE — 6370000000 HC RX 637 (ALT 250 FOR IP): Performed by: INTERNAL MEDICINE

## 2018-09-01 PROCEDURE — 2500000003 HC RX 250 WO HCPCS: Performed by: EMERGENCY MEDICINE

## 2018-09-01 PROCEDURE — 99231 SBSQ HOSP IP/OBS SF/LOW 25: CPT | Performed by: INTERNAL MEDICINE

## 2018-09-01 PROCEDURE — 36415 COLL VENOUS BLD VENIPUNCTURE: CPT

## 2018-09-01 RX ORDER — LEVOFLOXACIN 500 MG/1
500 TABLET, FILM COATED ORAL DAILY
Qty: 10 TABLET | Refills: 0 | Status: SHIPPED | OUTPATIENT
Start: 2018-09-01 | End: 2018-09-26 | Stop reason: SDUPTHER

## 2018-09-01 RX ADMIN — TAMSULOSIN HYDROCHLORIDE 0.4 MG: 0.4 CAPSULE ORAL at 08:08

## 2018-09-01 RX ADMIN — LORAZEPAM 0.5 MG: 0.5 TABLET ORAL at 13:54

## 2018-09-01 RX ADMIN — LORAZEPAM 0.5 MG: 0.5 TABLET ORAL at 08:08

## 2018-09-01 RX ADMIN — GUAIFENESIN 400 MG: 200 TABLET ORAL at 08:07

## 2018-09-01 RX ADMIN — METRONIDAZOLE 500 MG: 500 INJECTION, SOLUTION INTRAVENOUS at 02:47

## 2018-09-01 RX ADMIN — PANTOPRAZOLE SODIUM 40 MG: 40 TABLET, DELAYED RELEASE ORAL at 05:43

## 2018-09-01 RX ADMIN — LAMOTRIGINE 200 MG: 100 TABLET ORAL at 08:08

## 2018-09-01 RX ADMIN — Medication 1 MG: at 02:48

## 2018-09-01 RX ADMIN — ENOXAPARIN SODIUM 40 MG: 40 INJECTION SUBCUTANEOUS at 08:05

## 2018-09-01 RX ADMIN — METRONIDAZOLE 500 MG: 500 INJECTION, SOLUTION INTRAVENOUS at 11:07

## 2018-09-01 RX ADMIN — Medication 0.5 MG: at 09:57

## 2018-09-01 RX ADMIN — LOSARTAN POTASSIUM 100 MG: 100 TABLET ORAL at 08:08

## 2018-09-01 RX ADMIN — HYDROCHLOROTHIAZIDE 12.5 MG: 12.5 CAPSULE ORAL at 08:08

## 2018-09-01 ASSESSMENT — PAIN SCALES - GENERAL
PAINLEVEL_OUTOF10: 3
PAINLEVEL_OUTOF10: 7
PAINLEVEL_OUTOF10: 7

## 2018-09-01 ASSESSMENT — PAIN DESCRIPTION - PAIN TYPE: TYPE: ACUTE PAIN

## 2018-09-01 ASSESSMENT — PAIN DESCRIPTION - LOCATION: LOCATION: ABDOMEN

## 2018-09-01 NOTE — PROGRESS NOTES
Hospitalist Progress Note  9/1/2018 8:59 AM  Subjective:   Admit Date: 8/27/2018  PCP: Diane Mcnair MD    Chief Complaint: Abdominal Pain, Nausea    Subjective: BP better controlled overnight. Patient states that he feels well. Wants to go home. No new complaints. Review of Systems:   Constitutional: Denies fever or chills. Denies change in energy level or malaise. HEENT: Denies headaches or visual disturbances. Denies difficulty swallowing or sore throat. Respiratory: Denies cough or hoarseness. Denies shortness of breath. Cardiovascular: Denies chest pain or pressure. Denies palpitations. Denies presyncope/syncope. Denies orthopnea/PND. Denies lower extremity edema. Gastrointestinal:  Denies nausea/vomiting. Denies change in bowel habits or history of recent GI tract blood loss. Genitourinary: Denies urinary urgency or frequency. Denies dysuria, hematuria. Musculoskeletal: Denies pain or swelling in joints. Neurological: Denies paresthesias. Denies headache. Denies seizure or stroke symptoms. Behavioral/Psych: Denies problems with anxiety or depression. All other ROS negative except where stated above. DIET LOW FIBER;     Intake/Output Summary (Last 24 hours) at 09/01/18 0859  Last data filed at 09/01/18 0843   Gross per 24 hour   Intake          1738.33 ml   Output             2575 ml   Net          -836.67 ml     Medications:   sodium chloride 100 mL/hr at 08/31/18 2017     Current Facility-Administered Medications   Medication Dose Route Frequency Provider Last Rate Last Dose    losartan (COZAAR) tablet 100 mg  100 mg Oral Daily Galo Michael MD   100 mg at 09/01/18 1356    And    hydrochlorothiazide (MICROZIDE) capsule 12.5 mg  12.5 mg Oral Daily Galo Michael MD   12.5 mg at 09/01/18 6757    LORazepam (ATIVAN) tablet 0.5 mg  0.5 mg Oral TID Galo Michael MD   0.5 mg at 09/01/18 7703    tamsulosin (FLOMAX) capsule 0.4 mg  0.4 mg Oral Daily Galo Michael MD

## 2018-09-01 NOTE — PROGRESS NOTES
Josr Lagunas M.D. [facility]  Daily Progress Note    Pt Name: [address] [place] [address] Record Number: [id number]  Date of Birth 1965   Today's Date: 9/1/2018    Chief Complaint:  Chief Complaint   Patient presents with    Abdominal Pain     Started around 2000 described as sharp, stabbing and sensitive in left lower Quadrant. SUBJECTIVE:     Terry Garcia is doing well. Tolerating low residue.   Home today    MEDS:     Scheduled Meds:   losartan  100 mg Oral Daily    And    hydrochlorothiazide  12.5 mg Oral Daily    LORazepam  0.5 mg Oral TID    tamsulosin  0.4 mg Oral Daily    cefTRIAXone (ROCEPHIN) IV  1 g Intravenous Q24H    metroNIDAZOLE  500 mg Intravenous Q8H    sodium chloride flush  10 mL Intravenous 2 times per day    enoxaparin  40 mg Subcutaneous Daily    nicotine  1 patch Transdermal Daily    lamoTRIgine  200 mg Oral Daily    pantoprazole  40 mg Oral QAM AC    buPROPion  300 mg Oral Daily     Continuous Infusions:   sodium chloride 100 mL/hr at 08/31/18 2017     PRN Meds:  promethazine 12.5 mg Q6H PRN   sodium chloride flush 10 mL PRN   acetaminophen 650 mg Q4H PRN   morphine 2 mg Q2H PRN   ondansetron 4 mg Q4H PRN   HYDROmorphone 0.5 mg Q2H PRN   guaiFENesin 400 mg Q4H PRN   HYDROmorphone 1 mg Q2H PRN       OBJECTIVE:     Patient Vitals for the past 24 hrs:   BP Temp Temp src Pulse Resp SpO2 Height   09/01/18 1107 (!) 148/87 99.1 °F (37.3 °C) Temporal 67 16 95 % -   09/01/18 0601 (!) 140/89 98.7 °F (37.1 °C) Temporal 74 16 95 % -   09/01/18 0335 138/83 98.4 °F (36.9 °C) Oral 69 16 94 % -   09/01/18 0219 - 98.8 °F (37.1 °C) - - 14 - -   08/31/18 2335 134/82 98.8 °F (37.1 °C) Oral 73 14 95 % -   08/31/18 1810 (!) 162/89 98.6 °F (37 °C) Oral 86 18 100 % -   08/31/18 1519 128/81 97 °F (36.1 °C) Temporal 77 16 98 % -   08/31/18 1316 (!) 157/97 - - - - - -   08/31/18 1251 - - - - - - 6' 4\" (1.93 m)         Intake/Output Summary (Last 24 hours) at 09/01/18 1220  Last data filed

## 2018-09-01 NOTE — DISCHARGE INSTR - DIET

## 2018-09-01 NOTE — PLAN OF CARE
Problem: Pain:  Goal: Control of acute pain  Control of acute pain   Outcome: Ongoing      Problem:  Bowel/Gastric:  Goal: Bowel function will improve  Bowel function will improve   Outcome: Ongoing

## 2018-09-14 NOTE — DISCHARGE SUMMARY
Physician Discharge Summary         Patient ID:  Mari Marquez  [id number]  52 y.o. Admit date: 8/27/2018    Discharge date and time: 9/1/2018  3:19 PM     Admitting Physician: Moses Henry MD     Discharge Diagnoses:   Acute diverticulitis with microperforation    Patient Active Problem List   Diagnosis    Bipolar 2 disorder, major depressive episode ([address]    Constipation    Pelvic floor dysfunction    History of colon polyps    Diverticulitis    Essential hypertension    Gastroesophageal reflux disease without esophagitis    Diverticulitis of large intestine with perforation without abscess or bleeding     Discharged Condition: good    Hospital Course:   See hospital chart    Consults:  hospitalist      Disposition: home    Patient Instructions: Activity: activity as tolerated  Diet: low residue  Wound Care: none needed  Follow-up with Lupe Hook in 2-3 weeks     Medication List      CONTINUE taking these medications    aspirin 325 MG tablet     azithromycin 1 g powder  Commonly known as:  ZITHROMAX     buPROPion 300 MG extended release tablet  Commonly known as:  WELLBUTRIN XL  Take 1 tablet by mouth every morning     guaiFENesin 400 MG tablet     lamoTRIgine 200 MG tablet  Commonly known as:  LAMICTAL  Take 1 tablet by mouth daily     LORazepam 0.5 MG tablet  Commonly known as:  ATIVAN  TAKE 1 TABLET BY MOUTH EVERY 8 HOURS AS NEEDED FOR ANXIETY     losartan-hydrochlorothiazide 50-12.5 MG per tablet  Commonly known as:  HYZAAR  TAKE 1 TABLET DAILY     MULTIVITAMIN ADULT PO     RABEprazole 20 MG tablet  Commonly known as:  ACIPHEX  TAKE 1 TABLET DAILY     SUMAtriptan 100 MG tablet  Commonly known as:  IMITREX        ASK your doctor about these medications    levofloxacin 500 MG tablet  Commonly known as:  LEVAQUIN  Take 1 tablet by mouth daily for 10 days  Ask about: Should I take this medication?            Where to Get Your Medications      You can get these medications from any pharmacy Bring a paper prescription for each of these medications  · levofloxacin 500 MG tablet         Signed:  Electronically signed by Francine Mcnair PA-C on 9/14/18 at 9:21 AM

## 2018-09-26 ENCOUNTER — OFFICE VISIT (OUTPATIENT)
Dept: SURGERY | Age: 53
End: 2018-09-26

## 2018-09-26 VITALS
DIASTOLIC BLOOD PRESSURE: 74 MMHG | HEIGHT: 76 IN | TEMPERATURE: 98.6 F | BODY MASS INDEX: 21.31 KG/M2 | WEIGHT: 175 LBS | SYSTOLIC BLOOD PRESSURE: 130 MMHG

## 2018-09-26 DIAGNOSIS — K57.92 DIVERTICULITIS: Primary | ICD-10-CM

## 2018-09-26 PROCEDURE — 99024 POSTOP FOLLOW-UP VISIT: CPT | Performed by: PHYSICIAN ASSISTANT

## 2018-09-26 RX ORDER — LEVOFLOXACIN 500 MG/1
500 TABLET, FILM COATED ORAL DAILY
Qty: 10 TABLET | Refills: 0 | Status: SHIPPED | OUTPATIENT
Start: 2018-09-26 | End: 2018-10-15 | Stop reason: SDUPTHER

## 2018-09-27 NOTE — PROGRESS NOTES
Subjective:  Mr Kyree Upton comes today in follow-up from his diverticular disease. He had episodes of diverticulitis which he was hospitalized for. He reports that he is feeling much better. He has no fevers. He has very mild left lower quadrant pain at this time. He reports no pneumaturia. Objective:  Patient Active Problem List    Diagnosis Date Noted    [facility] 01/06/2017     Priority: High    Diverticulitis 08/28/2018    Essential hypertension 08/28/2018    Gastroesophageal reflux disease without esophagitis 08/28/2018    Diverticulitis of large intestine with perforation without abscess or bleeding     History of colon polyps 01/02/2018    Constipation     Pelvic floor dysfunction      Current Outpatient Prescriptions   Medication Sig Dispense Refill    levofloxacin (LEVAQUIN) 500 MG tablet Take 1 tablet by mouth daily for 10 days 10 tablet 0    guaiFENesin 400 MG tablet Take 400 mg by mouth every 4 hours as needed for Cough      aspirin 325 MG tablet Take 325 mg by mouth daily      Multiple Vitamins-Minerals (MULTIVITAMIN ADULT PO) Take 1 tablet by mouth daily       losartan-hydrochlorothiazide (HYZAAR) 50-12.5 MG per tablet TAKE 1 TABLET DAILY 90 tablet 3    RABEprazole (ACIPHEX) 20 MG tablet TAKE 1 TABLET DAILY 90 tablet 3    LORazepam (ATIVAN) 0.5 MG tablet TAKE 1 TABLET BY MOUTH EVERY 8 HOURS AS NEEDED FOR ANXIETY 90 tablet 0    buPROPion (WELLBUTRIN XL) 300 MG extended release tablet Take 1 tablet by mouth every morning 90 tablet 3    lamoTRIgine (LAMICTAL) 200 MG tablet Take 1 tablet by mouth daily 90 tablet 3    SUMAtriptan (IMITREX) 100 MG tablet Take 100 mg by mouth as needed   3     No current facility-administered medications for this visit. Allergies: Patient has no known allergies.      Past Medical History:   Diagnosis Date    Arthritis     [facility]     BPH (benign prostatic hypertrophy)     Constipation     Depression     GERD (gastroesophageal reflux disease)     Headache(784.0)     History of blood transfusion     Hypertension     Pelvic floor dysfunction      Past Surgical History:   Procedure Laterality Date    ACHILLES TENDON SURGERY      CHOLECYSTECTOMY      ENDOSCOPY, COLON, DIAGNOSTIC      JOINT REPLACEMENT      left ankle replacement    TONSILLECTOMY AND ADENOIDECTOMY       Family History   Problem Relation Age of Onset    Colon Cancer Neg Hx     Colon Polyps Neg Hx     Liver Cancer Neg Hx     Liver Disease Neg Hx     Esophageal Cancer Neg Hx     Stomach Cancer Neg Hx     Rectal Cancer Neg Hx      Social History   Substance Use Topics    Smoking status: Former Smoker    Smokeless tobacco: Never Used    Alcohol use No     Exam  Blood pressure 130/74, temperature 98.6 °F (37 °C), temperature source Temporal, height 6' 4\" (1.93 m), weight 175 lb (79.4 kg). Abdomen  His bowel sounds are normal. There are no palpable masses. His mild pain palpation left lower quadrant. Assessment  Resolved diverticulitis    Plan  We will send him to GI for colonoscopy in November. He will call us if his symptoms worsen.

## 2018-09-28 ENCOUNTER — TELEPHONE (OUTPATIENT)
Dept: SURGERY | Age: 53
End: 2018-09-28

## 2018-09-28 NOTE — TELEPHONE ENCOUNTER
Called patient and left message on answering machine that he is scheduled to see Yanna Noland on 11/28/18 at 2:20, this will be mailed to him as well. Ellen Dunlap

## 2018-10-15 ENCOUNTER — TELEPHONE (OUTPATIENT)
Dept: SURGERY | Age: 53
End: 2018-10-15

## 2018-10-15 DIAGNOSIS — K57.92 DIVERTICULITIS: ICD-10-CM

## 2018-10-15 RX ORDER — LEVOFLOXACIN 500 MG/1
500 TABLET, FILM COATED ORAL DAILY
Qty: 10 TABLET | Refills: 3 | Status: SHIPPED | OUTPATIENT
Start: 2018-10-15 | End: 2018-10-25

## 2018-10-25 ENCOUNTER — OFFICE VISIT (OUTPATIENT)
Dept: GASTROENTEROLOGY | Age: 53
End: 2018-10-25
Payer: COMMERCIAL

## 2018-10-25 VITALS
HEIGHT: 76 IN | BODY MASS INDEX: 21.77 KG/M2 | DIASTOLIC BLOOD PRESSURE: 90 MMHG | SYSTOLIC BLOOD PRESSURE: 150 MMHG | WEIGHT: 178.8 LBS | OXYGEN SATURATION: 98 % | HEART RATE: 78 BPM

## 2018-10-25 DIAGNOSIS — Z12.11 SCREENING FOR COLON CANCER: ICD-10-CM

## 2018-10-25 DIAGNOSIS — Z87.19 HISTORY OF COLONIC DIVERTICULITIS: ICD-10-CM

## 2018-10-25 DIAGNOSIS — K57.30 DIVERTICULOSIS OF LARGE INTESTINE WITHOUT HEMORRHAGE: Primary | ICD-10-CM

## 2018-10-25 DIAGNOSIS — Z86.010 HISTORY OF ADENOMATOUS POLYP OF COLON: ICD-10-CM

## 2018-10-25 PROBLEM — Z86.0101 HISTORY OF ADENOMATOUS POLYP OF COLON: Status: ACTIVE | Noted: 2018-01-02

## 2018-10-25 PROCEDURE — 99214 OFFICE O/P EST MOD 30 MIN: CPT | Performed by: NURSE PRACTITIONER

## 2018-10-25 RX ORDER — LANOLIN ALCOHOL/MO/W.PET/CERES
400 CREAM (GRAM) TOPICAL DAILY
COMMUNITY

## 2018-10-25 ASSESSMENT — ENCOUNTER SYMPTOMS
SORE THROAT: 0
BLOOD IN STOOL: 0
ABDOMINAL PAIN: 0
COUGH: 0
NAUSEA: 0
DIARRHEA: 0
VOICE CHANGE: 0
CONSTIPATION: 0
RECTAL PAIN: 0
ABDOMINAL DISTENTION: 0
BACK PAIN: 0
CHEST TIGHTNESS: 0
VOMITING: 0
SHORTNESS OF BREATH: 0

## 2018-10-25 NOTE — PROGRESS NOTES
Subjective:      Patient ID: Pieter Dobson is a 52 y.o. male  Christoph Kruse MD    hospitals  Chief Complaint   Patient presents with    Diverticulitis     ref by NÉSTOR Aaron    Colon Cancer Screening       Patient recently in hospital for acute diverticulitis with microperforation. Placed on antibiotics and liquids. Pain resolved and patient was released. He is trying to watch his diet closely. He has never had diverticulitis before. He is overdue for screening colonoscopy. Adenomatous polyp removed in 2011. The patient denies abdominal or flank pain, anorexia, nausea or vomiting, dysphagia, change in bowel habits or black or bloody stools or weight loss. He has pelvic floor dysfunction and has difficulty with bm. Has tried PT and biofeedback. These were helpful for a time.          Family History   Problem Relation Age of Onset    Colon Cancer Neg Hx     Colon Polyps Neg Hx     Liver Cancer Neg Hx     Liver Disease Neg Hx     Esophageal Cancer Neg Hx     Stomach Cancer Neg Hx     Rectal Cancer Neg Hx        Past Medical History:   Diagnosis Date    Arthritis     Bipolar disorder ([address]     BPH (benign prostatic hypertrophy)     Constipation     Depression     GERD (gastroesophageal reflux disease)     Headache(784.0)     History of blood transfusion     Hypertension     Pelvic floor dysfunction        Past Surgical History:   Procedure Laterality Date    ACHILLES TENDON SURGERY      CHOLECYSTECTOMY      ENDOSCOPY, COLON, DIAGNOSTIC      JOINT REPLACEMENT      left ankle replacement    TONSILLECTOMY AND ADENOIDECTOMY         Current Outpatient Prescriptions   Medication Sig Dispense Refill    folic acid (FOLVITE) 161 MCG tablet Take 400 mcg by mouth daily      aspirin 325 MG tablet Take 325 mg by mouth daily      Multiple Vitamins-Minerals (MULTIVITAMIN ADULT PO) Take 1 tablet by mouth daily       losartan-hydrochlorothiazide (HYZAAR) 50-12.5 MG per tablet TAKE 1 TABLET DAILY 90 Effort normal and breath sounds normal. No respiratory distress. He has no wheezes. He has no rhonchi. He has no rales. Abdominal: Soft. Normal appearance and bowel sounds are normal. He exhibits no distension and no mass. There is no hepatomegaly. There is no tenderness. There is no rebound and no guarding. Musculoskeletal: He exhibits no edema. Neurological: He is alert and oriented to person, place, and time. He has normal strength. Skin: Skin is warm, dry and intact. No cyanosis. No pallor. Psychiatric: He has a normal mood and affect. His behavior is normal. Thought content normal. Cognition and memory are normal.       Assessment:      1. Diverticulosis of large intestine without hemorrhage    2. History of colonic diverticulitis    3. History of adenomatous polyp of colon    4. Screening for colon cancer          Plan:      Schedule colonoscopy screening    Instruct on bowel prep. Nothing to eat or drink after midnight the day of the exam.  Unable to drive for 24 hours after the procedure. No aspirin or nonsteroidal anti-inflammatories for 5 days before procedure. I have discussed the benefits, alternatives, and risks (including bleeding, perforation and death)  for pursuing Endoscopy (EGD/Colonscopy/EUS/ERCP) with the patient and they are willing to continue. We also discussed the need for anesthesia, IV access, proper dietary changes, medication changes if necessary, and need for bowel prep (if ordered) prior to their Endoscopic procedure. They are aware they must have someone accompany them to their scheduled procedure to drive them home - they agree to the above and are willing to continue.      Plan for anesthesia: MAC

## 2018-10-26 ENCOUNTER — TELEPHONE (OUTPATIENT)
Dept: SURGERY | Age: 53
End: 2018-10-26

## 2018-12-31 RX ORDER — RABEPRAZOLE SODIUM 20 MG/1
TABLET, DELAYED RELEASE ORAL
Qty: 90 TABLET | Refills: 3 | Status: SHIPPED | OUTPATIENT
Start: 2018-12-31 | End: 2019-04-15 | Stop reason: SDUPTHER

## 2019-03-11 ENCOUNTER — ANESTHESIA EVENT (OUTPATIENT)
Dept: OPERATING ROOM | Age: 54
End: 2019-03-11

## 2019-03-14 ENCOUNTER — APPOINTMENT (OUTPATIENT)
Dept: OPERATING ROOM | Age: 54
End: 2019-03-14

## 2019-03-14 ENCOUNTER — HOSPITAL ENCOUNTER (OUTPATIENT)
Age: 54
Setting detail: OUTPATIENT SURGERY
Discharge: HOME | End: 2019-03-14
Attending: INTERNAL MEDICINE | Admitting: INTERNAL MEDICINE
Payer: COMMERCIAL

## 2019-03-14 ENCOUNTER — ANESTHESIA (OUTPATIENT)
Dept: OPERATING ROOM | Age: 54
End: 2019-03-14

## 2019-03-14 VITALS
RESPIRATION RATE: 16 BRPM | HEART RATE: 54 BPM | BODY MASS INDEX: 22.53 KG/M2 | DIASTOLIC BLOOD PRESSURE: 84 MMHG | OXYGEN SATURATION: 100 % | WEIGHT: 185 LBS | SYSTOLIC BLOOD PRESSURE: 149 MMHG | HEIGHT: 76 IN | TEMPERATURE: 98 F

## 2019-03-14 VITALS — SYSTOLIC BLOOD PRESSURE: 134 MMHG | OXYGEN SATURATION: 98 % | DIASTOLIC BLOOD PRESSURE: 79 MMHG

## 2019-03-14 PROCEDURE — G0121 COLON CA SCRN NOT HI RSK IND: HCPCS

## 2019-03-14 PROCEDURE — 45378 DIAGNOSTIC COLONOSCOPY: CPT | Performed by: INTERNAL MEDICINE

## 2019-03-14 PROCEDURE — G8918 PT W/O PREOP ORDER IV AB PRO: HCPCS

## 2019-03-14 PROCEDURE — G8907 PT DOC NO EVENTS ON DISCHARG: HCPCS

## 2019-03-14 RX ORDER — SODIUM CHLORIDE, SODIUM LACTATE, POTASSIUM CHLORIDE, CALCIUM CHLORIDE 600; 310; 30; 20 MG/100ML; MG/100ML; MG/100ML; MG/100ML
INJECTION, SOLUTION INTRAVENOUS CONTINUOUS
Status: DISCONTINUED | OUTPATIENT
Start: 2019-03-14 | End: 2019-03-14 | Stop reason: SDUPTHER

## 2019-03-14 RX ORDER — LIDOCAINE HYDROCHLORIDE 10 MG/ML
INJECTION, SOLUTION INFILTRATION; PERINEURAL PRN
Status: DISCONTINUED | OUTPATIENT
Start: 2019-03-14 | End: 2019-03-14 | Stop reason: SDUPTHER

## 2019-03-14 RX ORDER — LIDOCAINE HYDROCHLORIDE 10 MG/ML
1 INJECTION, SOLUTION EPIDURAL; INFILTRATION; INTRACAUDAL; PERINEURAL
Status: DISCONTINUED | OUTPATIENT
Start: 2019-03-14 | End: 2019-03-14 | Stop reason: HOSPADM

## 2019-03-14 RX ORDER — SODIUM CHLORIDE 9 MG/ML
INJECTION, SOLUTION INTRAVENOUS CONTINUOUS
Status: DISCONTINUED | OUTPATIENT
Start: 2019-03-14 | End: 2019-03-14 | Stop reason: HOSPADM

## 2019-03-14 RX ORDER — PROPOFOL 10 MG/ML
INJECTION, EMULSION INTRAVENOUS PRN
Status: DISCONTINUED | OUTPATIENT
Start: 2019-03-14 | End: 2019-03-14 | Stop reason: SDUPTHER

## 2019-03-14 RX ADMIN — LIDOCAINE HYDROCHLORIDE 40 MG: 10 INJECTION, SOLUTION INFILTRATION; PERINEURAL at 09:00

## 2019-03-14 RX ADMIN — SODIUM CHLORIDE: 9 INJECTION, SOLUTION INTRAVENOUS at 08:07

## 2019-03-14 RX ADMIN — PROPOFOL 200 MG: 10 INJECTION, EMULSION INTRAVENOUS at 09:00

## 2019-03-14 ASSESSMENT — PAIN SCALES - GENERAL
PAINLEVEL_OUTOF10: 0
PAINLEVEL_OUTOF10: 0

## 2019-03-14 ASSESSMENT — PAIN - FUNCTIONAL ASSESSMENT: PAIN_FUNCTIONAL_ASSESSMENT: 0-10

## 2019-04-15 DIAGNOSIS — I10 ESSENTIAL HYPERTENSION: Primary | Chronic | ICD-10-CM

## 2019-04-15 RX ORDER — LOSARTAN POTASSIUM AND HYDROCHLOROTHIAZIDE 12.5; 5 MG/1; MG/1
TABLET ORAL
Qty: 90 TABLET | Refills: 3 | Status: SHIPPED | OUTPATIENT
Start: 2019-04-15 | End: 2019-05-02 | Stop reason: SDUPTHER

## 2019-04-15 RX ORDER — RABEPRAZOLE SODIUM 20 MG/1
TABLET, DELAYED RELEASE ORAL
Qty: 90 TABLET | Refills: 3 | Status: SHIPPED | OUTPATIENT
Start: 2019-04-15 | End: 2020-06-29

## 2019-04-15 RX ORDER — LOSARTAN POTASSIUM AND HYDROCHLOROTHIAZIDE 12.5; 5 MG/1; MG/1
TABLET ORAL
Qty: 30 TABLET | Refills: 1 | Status: SHIPPED | OUTPATIENT
Start: 2019-04-15 | End: 2019-04-15 | Stop reason: SDUPTHER

## 2019-04-23 PROBLEM — Z12.11 SCREENING FOR COLON CANCER: Status: RESOLVED | Noted: 2017-11-09 | Resolved: 2019-04-23

## 2019-04-26 ENCOUNTER — TELEPHONE (OUTPATIENT)
Dept: GASTROENTEROLOGY | Age: 54
End: 2019-04-26

## 2019-04-26 ENCOUNTER — TELEPHONE (OUTPATIENT)
Dept: INTERNAL MEDICINE | Age: 54
End: 2019-04-26

## 2019-04-26 NOTE — TELEPHONE ENCOUNTER
He is asking about a new medication for pelvic floor disfunction called Motegrity. He would like MD opinion.

## 2019-04-26 NOTE — TELEPHONE ENCOUNTER
Patient called today wanting to know if Peter Lewis would send in a script for Karen Bales. I told him that Peter Lewis was out ot the office today, but I would send her a message regarding the request, and I would call him back when I received a response from her. [address] with [facility] Recommended Oral Dose  Regimen  Adults 2 mg once daily. (2)  Patients with severe renal 1 mg once daily.  (2, 8.5, 8.6)  impairment (creatinine  clearance (CrCL) less  than 30 mL/min)

## 2019-05-02 ENCOUNTER — TELEPHONE (OUTPATIENT)
Dept: INTERNAL MEDICINE | Age: 54
End: 2019-05-02

## 2019-05-02 RX ORDER — LOSARTAN POTASSIUM AND HYDROCHLOROTHIAZIDE 12.5; 5 MG/1; MG/1
TABLET ORAL
Qty: 90 TABLET | Refills: 3 | Status: SHIPPED | OUTPATIENT
Start: 2019-05-02 | End: 2020-07-20

## 2019-05-02 NOTE — TELEPHONE ENCOUNTER
Patient left message stating his losartin has been re-called and wants to know if he should be switched to something else

## 2019-07-15 ENCOUNTER — OFFICE VISIT (OUTPATIENT)
Dept: INTERNAL MEDICINE | Age: 54
End: 2019-07-15
Payer: COMMERCIAL

## 2019-07-15 VITALS
WEIGHT: 176 LBS | OXYGEN SATURATION: 99 % | DIASTOLIC BLOOD PRESSURE: 78 MMHG | BODY MASS INDEX: 21.43 KG/M2 | HEIGHT: 76 IN | HEART RATE: 64 BPM | SYSTOLIC BLOOD PRESSURE: 120 MMHG

## 2019-07-15 DIAGNOSIS — K59.02 CONSTIPATION DUE TO OUTLET DYSFUNCTION: ICD-10-CM

## 2019-07-15 DIAGNOSIS — Z12.5 SCREENING FOR PROSTATE CANCER: ICD-10-CM

## 2019-07-15 DIAGNOSIS — F31.81 BIPOLAR 2 DISORDER, MAJOR DEPRESSIVE EPISODE (HCC): Chronic | ICD-10-CM

## 2019-07-15 DIAGNOSIS — R74.8 ELEVATED LIVER ENZYMES: ICD-10-CM

## 2019-07-15 DIAGNOSIS — I10 ESSENTIAL HYPERTENSION: Chronic | ICD-10-CM

## 2019-07-15 DIAGNOSIS — Z00.00 ANNUAL PHYSICAL EXAM: ICD-10-CM

## 2019-07-15 DIAGNOSIS — M62.89 PELVIC FLOOR DYSFUNCTION: ICD-10-CM

## 2019-07-15 DIAGNOSIS — Z00.00 ANNUAL PHYSICAL EXAM: Primary | ICD-10-CM

## 2019-07-15 LAB
ALBUMIN SERPL-MCNC: 4.9 G/DL (ref 3.5–5.2)
ALP BLD-CCNC: 81 U/L (ref 40–130)
ALT SERPL-CCNC: 15 U/L (ref 5–41)
AMYLASE: 100 U/L (ref 28–100)
ANION GAP SERPL CALCULATED.3IONS-SCNC: 12 MMOL/L (ref 7–19)
AST SERPL-CCNC: 23 U/L (ref 5–40)
BILIRUB SERPL-MCNC: 0.3 MG/DL (ref 0.2–1.2)
BUN BLDV-MCNC: 15 MG/DL (ref 6–20)
CALCIUM SERPL-MCNC: 10 MG/DL (ref 8.6–10)
CHLORIDE BLD-SCNC: 99 MMOL/L (ref 98–111)
CHOLESTEROL, TOTAL: 184 MG/DL (ref 160–199)
CO2: 31 MMOL/L (ref 22–29)
CREAT SERPL-MCNC: 1 MG/DL (ref 0.5–1.2)
GFR NON-AFRICAN AMERICAN: >60
GLUCOSE BLD-MCNC: 75 MG/DL (ref 74–109)
HCT VFR BLD CALC: 43.9 % (ref 42–52)
HDLC SERPL-MCNC: 70 MG/DL (ref 55–121)
HEMOGLOBIN: 14.1 G/DL (ref 14–18)
LDL CHOLESTEROL CALCULATED: 96 MG/DL
LIPASE: 44 U/L (ref 13–60)
MCH RBC QN AUTO: 30.5 PG (ref 27–31)
MCHC RBC AUTO-ENTMCNC: 32.1 G/DL (ref 33–37)
MCV RBC AUTO: 94.8 FL (ref 80–94)
PDW BLD-RTO: 12.9 % (ref 11.5–14.5)
PLATELET # BLD: 202 K/UL (ref 130–400)
PMV BLD AUTO: 10.5 FL (ref 9.4–12.4)
POTASSIUM SERPL-SCNC: 4 MMOL/L (ref 3.5–5)
PROSTATE SPECIFIC ANTIGEN: 1.83 NG/ML (ref 0–4)
RBC # BLD: 4.63 M/UL (ref 4.7–6.1)
SODIUM BLD-SCNC: 142 MMOL/L (ref 136–145)
TOTAL PROTEIN: 7.5 G/DL (ref 6.6–8.7)
TRIGL SERPL-MCNC: 92 MG/DL (ref 0–149)
TSH SERPL DL<=0.05 MIU/L-ACNC: 3.66 UIU/ML (ref 0.27–4.2)
WBC # BLD: 6.4 K/UL (ref 4.8–10.8)

## 2019-07-15 PROCEDURE — 99396 PREV VISIT EST AGE 40-64: CPT | Performed by: INTERNAL MEDICINE
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
## 2019-07-16 ASSESSMENT — ENCOUNTER SYMPTOMS
BACK PAIN: 0
EYE DISCHARGE: 0
ABDOMINAL DISTENTION: 1
SINUS PRESSURE: 0
SHORTNESS OF BREATH: 0
ABDOMINAL PAIN: 1
CONSTIPATION: 1
COUGH: 0
EYE REDNESS: 0

## 2019-07-19 LAB — CERULOPLASMIN: 26 MG/DL (ref 15–30)

## 2019-09-27 ENCOUNTER — OFFICE VISIT (OUTPATIENT)
Dept: INTERNAL MEDICINE | Age: 54
End: 2019-09-27
Payer: COMMERCIAL

## 2019-09-27 VITALS
SYSTOLIC BLOOD PRESSURE: 130 MMHG | HEIGHT: 76 IN | BODY MASS INDEX: 21.43 KG/M2 | RESPIRATION RATE: 18 BRPM | OXYGEN SATURATION: 98 % | WEIGHT: 176 LBS | HEART RATE: 62 BPM | DIASTOLIC BLOOD PRESSURE: 88 MMHG

## 2019-09-27 DIAGNOSIS — F31.81 BIPOLAR 2 DISORDER (HCC): ICD-10-CM

## 2019-09-27 DIAGNOSIS — M62.89 PELVIC FLOOR DYSFUNCTION: ICD-10-CM

## 2019-09-27 DIAGNOSIS — I10 ESSENTIAL HYPERTENSION: Primary | Chronic | ICD-10-CM

## 2019-09-27 PROCEDURE — 99214 OFFICE O/P EST MOD 30 MIN: CPT | Performed by: INTERNAL MEDICINE

## 2019-09-27 RX ORDER — SUMATRIPTAN 100 MG/1
100 TABLET, FILM COATED ORAL PRN
Qty: 9 TABLET | Refills: 3 | Status: SHIPPED | OUTPATIENT
Start: 2019-09-27

## 2019-09-27 RX ORDER — SUMATRIPTAN 100 MG/1
100 TABLET, FILM COATED ORAL PRN
Qty: 9 TABLET | Refills: 3 | Status: CANCELLED | OUTPATIENT
Start: 2019-09-27

## 2019-09-29 ENCOUNTER — APPOINTMENT (OUTPATIENT)
Dept: CT IMAGING | Age: 54
End: 2019-09-29
Payer: COMMERCIAL

## 2019-09-29 ENCOUNTER — HOSPITAL ENCOUNTER (OUTPATIENT)
Age: 54
Setting detail: OBSERVATION
Discharge: HOME | End: 2019-10-01
Attending: EMERGENCY MEDICINE | Admitting: SURGERY
Payer: COMMERCIAL

## 2019-09-29 DIAGNOSIS — R10.9 ACUTE ABDOMINAL PAIN: ICD-10-CM

## 2019-09-29 DIAGNOSIS — K56.609 SBO (SMALL BOWEL OBSTRUCTION) (HCC): Primary | ICD-10-CM

## 2019-09-29 LAB
ALBUMIN SERPL-MCNC: 4.9 G/DL (ref 3.5–5.2)
ALP BLD-CCNC: 96 U/L (ref 40–130)
ALT SERPL-CCNC: 20 U/L (ref 5–41)
ANION GAP SERPL CALCULATED.3IONS-SCNC: 12 MMOL/L (ref 7–19)
AST SERPL-CCNC: 25 U/L (ref 5–40)
BASOPHILS ABSOLUTE: 0.1 K/UL (ref 0–0.2)
BASOPHILS RELATIVE PERCENT: 0.4 % (ref 0–1)
BILIRUB SERPL-MCNC: 0.3 MG/DL (ref 0.2–1.2)
BILIRUBIN URINE: NEGATIVE
BLOOD, URINE: NEGATIVE
BUN BLDV-MCNC: 13 MG/DL (ref 6–20)
CALCIUM SERPL-MCNC: 9.8 MG/DL (ref 8.6–10)
CHLORIDE BLD-SCNC: 103 MMOL/L (ref 98–111)
CLARITY: ABNORMAL
CO2: 29 MMOL/L (ref 22–29)
COLOR: YELLOW
CREAT SERPL-MCNC: 1.1 MG/DL (ref 0.5–1.2)
EOSINOPHILS ABSOLUTE: 0.2 K/UL (ref 0–0.6)
EOSINOPHILS RELATIVE PERCENT: 1.3 % (ref 0–5)
GFR NON-AFRICAN AMERICAN: >60
GLUCOSE BLD-MCNC: 95 MG/DL (ref 74–109)
GLUCOSE URINE: NEGATIVE MG/DL
HCT VFR BLD CALC: 43.5 % (ref 42–52)
HEMOGLOBIN: 14.4 G/DL (ref 14–18)
IMMATURE GRANULOCYTES #: 0 K/UL
KETONES, URINE: NEGATIVE MG/DL
LEUKOCYTE ESTERASE, URINE: NEGATIVE
LYMPHOCYTES ABSOLUTE: 1.2 K/UL (ref 1.1–4.5)
LYMPHOCYTES RELATIVE PERCENT: 10.3 % (ref 20–40)
MCH RBC QN AUTO: 31 PG (ref 27–31)
MCHC RBC AUTO-ENTMCNC: 33.1 G/DL (ref 33–37)
MCV RBC AUTO: 93.5 FL (ref 80–94)
MONOCYTES ABSOLUTE: 1.1 K/UL (ref 0–0.9)
MONOCYTES RELATIVE PERCENT: 9.7 % (ref 0–10)
NEUTROPHILS ABSOLUTE: 8.9 K/UL (ref 1.5–7.5)
NEUTROPHILS RELATIVE PERCENT: 77.9 % (ref 50–65)
NITRITE, URINE: NEGATIVE
PDW BLD-RTO: 12.8 % (ref 11.5–14.5)
PH UA: 8 (ref 5–8)
PLATELET # BLD: 219 K/UL (ref 130–400)
PMV BLD AUTO: 10.6 FL (ref 9.4–12.4)
POTASSIUM SERPL-SCNC: 4.5 MMOL/L (ref 3.5–5)
PROTEIN UA: NEGATIVE MG/DL
RBC # BLD: 4.65 M/UL (ref 4.7–6.1)
SODIUM BLD-SCNC: 144 MMOL/L (ref 136–145)
SPECIFIC GRAVITY UA: 1.03 (ref 1–1.03)
TOTAL PROTEIN: 7 G/DL (ref 6.6–8.7)
URINE REFLEX TO CULTURE: YES
UROBILINOGEN, URINE: 0.2 E.U./DL
WBC # BLD: 11.4 K/UL (ref 4.8–10.8)

## 2019-09-29 PROCEDURE — 96375 TX/PRO/DX INJ NEW DRUG ADDON: CPT

## 2019-09-29 PROCEDURE — 99285 EMERGENCY DEPT VISIT HI MDM: CPT

## 2019-09-29 PROCEDURE — 6360000004 HC RX CONTRAST MEDICATION: Performed by: EMERGENCY MEDICINE

## 2019-09-29 PROCEDURE — 96374 THER/PROPH/DIAG INJ IV PUSH: CPT

## 2019-09-29 PROCEDURE — 6360000002 HC RX W HCPCS: Performed by: EMERGENCY MEDICINE

## 2019-09-29 PROCEDURE — 80053 COMPREHEN METABOLIC PANEL: CPT

## 2019-09-29 PROCEDURE — 2580000003 HC RX 258: Performed by: EMERGENCY MEDICINE

## 2019-09-29 PROCEDURE — 85025 COMPLETE CBC W/AUTO DIFF WBC: CPT

## 2019-09-29 PROCEDURE — 36415 COLL VENOUS BLD VENIPUNCTURE: CPT

## 2019-09-29 PROCEDURE — 96376 TX/PRO/DX INJ SAME DRUG ADON: CPT

## 2019-09-29 PROCEDURE — 74177 CT ABD & PELVIS W/CONTRAST: CPT

## 2019-09-29 RX ORDER — 0.9 % SODIUM CHLORIDE 0.9 %
1000 INTRAVENOUS SOLUTION INTRAVENOUS ONCE
Status: COMPLETED | OUTPATIENT
Start: 2019-09-29 | End: 2019-09-30

## 2019-09-29 RX ORDER — ONDANSETRON 2 MG/ML
4 INJECTION INTRAMUSCULAR; INTRAVENOUS ONCE
Status: COMPLETED | OUTPATIENT
Start: 2019-09-29 | End: 2019-09-29

## 2019-09-29 RX ADMIN — HYDROMORPHONE HYDROCHLORIDE 1 MG: 1 INJECTION, SOLUTION INTRAMUSCULAR; INTRAVENOUS; SUBCUTANEOUS at 22:59

## 2019-09-29 RX ADMIN — ONDANSETRON 4 MG: 2 INJECTION INTRAMUSCULAR; INTRAVENOUS at 22:13

## 2019-09-29 RX ADMIN — HYDROMORPHONE HYDROCHLORIDE 1 MG: 1 INJECTION, SOLUTION INTRAMUSCULAR; INTRAVENOUS; SUBCUTANEOUS at 22:13

## 2019-09-29 RX ADMIN — IOPAMIDOL 95 ML: 755 INJECTION, SOLUTION INTRAVENOUS at 22:31

## 2019-09-29 RX ADMIN — SODIUM CHLORIDE 1000 ML: 9 INJECTION, SOLUTION INTRAVENOUS at 22:13

## 2019-09-29 ASSESSMENT — ENCOUNTER SYMPTOMS
SHORTNESS OF BREATH: 0
VOMITING: 0
COUGH: 0
DIARRHEA: 0
SORE THROAT: 0
ABDOMINAL PAIN: 1
RHINORRHEA: 0
NAUSEA: 0

## 2019-09-29 ASSESSMENT — PAIN DESCRIPTION - ORIENTATION
ORIENTATION: LEFT
ORIENTATION: LEFT

## 2019-09-29 ASSESSMENT — PAIN DESCRIPTION - PAIN TYPE
TYPE: ACUTE PAIN

## 2019-09-29 ASSESSMENT — PAIN SCALES - GENERAL
PAINLEVEL_OUTOF10: 5
PAINLEVEL_OUTOF10: 3
PAINLEVEL_OUTOF10: 5

## 2019-09-29 ASSESSMENT — PAIN DESCRIPTION - LOCATION
LOCATION: ABDOMEN

## 2019-09-29 ASSESSMENT — PAIN DESCRIPTION - FREQUENCY: FREQUENCY: INTERMITTENT

## 2019-09-29 ASSESSMENT — PAIN DESCRIPTION - DESCRIPTORS: DESCRIPTORS: ACHING

## 2019-09-30 PROBLEM — K56.609 SMALL BOWEL OBSTRUCTION (HCC): Status: ACTIVE | Noted: 2019-09-30

## 2019-09-30 PROCEDURE — G0378 HOSPITAL OBSERVATION PER HR: HCPCS

## 2019-09-30 PROCEDURE — 6370000000 HC RX 637 (ALT 250 FOR IP): Performed by: SURGERY

## 2019-09-30 PROCEDURE — 96376 TX/PRO/DX INJ SAME DRUG ADON: CPT

## 2019-09-30 PROCEDURE — 99220 PR INITIAL OBSERVATION CARE/DAY 70 MINUTES: CPT | Performed by: SURGERY

## 2019-09-30 PROCEDURE — 2580000003 HC RX 258: Performed by: SURGERY

## 2019-09-30 PROCEDURE — 96372 THER/PROPH/DIAG INJ SC/IM: CPT

## 2019-09-30 PROCEDURE — 96375 TX/PRO/DX INJ NEW DRUG ADDON: CPT

## 2019-09-30 PROCEDURE — 81003 URINALYSIS AUTO W/O SCOPE: CPT

## 2019-09-30 PROCEDURE — 6360000002 HC RX W HCPCS: Performed by: SURGERY

## 2019-09-30 PROCEDURE — 94640 AIRWAY INHALATION TREATMENT: CPT

## 2019-09-30 PROCEDURE — C9113 INJ PANTOPRAZOLE SODIUM, VIA: HCPCS | Performed by: SURGERY

## 2019-09-30 PROCEDURE — 6360000002 HC RX W HCPCS: Performed by: EMERGENCY MEDICINE

## 2019-09-30 RX ORDER — SODIUM CHLORIDE 9 MG/ML
INJECTION, SOLUTION INTRAVENOUS CONTINUOUS
Status: DISCONTINUED | OUTPATIENT
Start: 2019-09-30 | End: 2019-10-01 | Stop reason: HOSPADM

## 2019-09-30 RX ORDER — ACETAMINOPHEN 325 MG/1
650 TABLET ORAL EVERY 4 HOURS PRN
Status: DISCONTINUED | OUTPATIENT
Start: 2019-09-30 | End: 2019-10-01 | Stop reason: HOSPADM

## 2019-09-30 RX ORDER — LORAZEPAM 2 MG/ML
0.5 INJECTION INTRAMUSCULAR EVERY 8 HOURS PRN
Status: DISCONTINUED | OUTPATIENT
Start: 2019-09-30 | End: 2019-10-01 | Stop reason: HOSPADM

## 2019-09-30 RX ORDER — NICOTINE 21 MG/24HR
1 PATCH, TRANSDERMAL 24 HOURS TRANSDERMAL DAILY
Status: DISCONTINUED | OUTPATIENT
Start: 2019-09-30 | End: 2019-10-01 | Stop reason: HOSPADM

## 2019-09-30 RX ORDER — IPRATROPIUM BROMIDE AND ALBUTEROL SULFATE 2.5; .5 MG/3ML; MG/3ML
1 SOLUTION RESPIRATORY (INHALATION)
Status: DISCONTINUED | OUTPATIENT
Start: 2019-09-30 | End: 2019-10-01

## 2019-09-30 RX ORDER — IPRATROPIUM BROMIDE AND ALBUTEROL SULFATE 2.5; .5 MG/3ML; MG/3ML
SOLUTION RESPIRATORY (INHALATION)
Status: DISPENSED
Start: 2019-09-30 | End: 2019-10-01

## 2019-09-30 RX ORDER — SODIUM CHLORIDE 0.9 % (FLUSH) 0.9 %
10 SYRINGE (ML) INJECTION EVERY 12 HOURS SCHEDULED
Status: DISCONTINUED | OUTPATIENT
Start: 2019-09-30 | End: 2019-10-01 | Stop reason: HOSPADM

## 2019-09-30 RX ORDER — SODIUM CHLORIDE 0.9 % (FLUSH) 0.9 %
10 SYRINGE (ML) INJECTION PRN
Status: DISCONTINUED | OUTPATIENT
Start: 2019-09-30 | End: 2019-10-01 | Stop reason: HOSPADM

## 2019-09-30 RX ORDER — 0.9 % SODIUM CHLORIDE 0.9 %
10 VIAL (ML) INJECTION DAILY
Status: DISCONTINUED | OUTPATIENT
Start: 2019-09-30 | End: 2019-10-01 | Stop reason: HOSPADM

## 2019-09-30 RX ORDER — PANTOPRAZOLE SODIUM 40 MG/10ML
40 INJECTION, POWDER, LYOPHILIZED, FOR SOLUTION INTRAVENOUS DAILY
Status: DISCONTINUED | OUTPATIENT
Start: 2019-09-30 | End: 2019-10-01 | Stop reason: HOSPADM

## 2019-09-30 RX ORDER — ONDANSETRON 2 MG/ML
4 INJECTION INTRAMUSCULAR; INTRAVENOUS EVERY 6 HOURS PRN
Status: DISCONTINUED | OUTPATIENT
Start: 2019-09-30 | End: 2019-10-01 | Stop reason: HOSPADM

## 2019-09-30 RX ADMIN — ONDANSETRON 4 MG: 2 INJECTION INTRAMUSCULAR; INTRAVENOUS at 14:56

## 2019-09-30 RX ADMIN — HYDROMORPHONE HYDROCHLORIDE 1 MG: 1 INJECTION, SOLUTION INTRAMUSCULAR; INTRAVENOUS; SUBCUTANEOUS at 14:51

## 2019-09-30 RX ADMIN — PANTOPRAZOLE SODIUM 40 MG: 40 INJECTION, POWDER, FOR SOLUTION INTRAVENOUS at 13:18

## 2019-09-30 RX ADMIN — Medication 10 ML: at 13:18

## 2019-09-30 RX ADMIN — SODIUM CHLORIDE: 9 INJECTION, SOLUTION INTRAVENOUS at 12:06

## 2019-09-30 RX ADMIN — IPRATROPIUM BROMIDE AND ALBUTEROL SULFATE 1 AMPULE: .5; 3 SOLUTION RESPIRATORY (INHALATION) at 13:51

## 2019-09-30 RX ADMIN — Medication 10 ML: at 08:25

## 2019-09-30 RX ADMIN — HYDROMORPHONE HYDROCHLORIDE 1 MG: 1 INJECTION, SOLUTION INTRAMUSCULAR; INTRAVENOUS; SUBCUTANEOUS at 06:25

## 2019-09-30 RX ADMIN — LORAZEPAM 0.5 MG: 2 INJECTION INTRAMUSCULAR; INTRAVENOUS at 13:18

## 2019-09-30 RX ADMIN — LORAZEPAM 0.5 MG: 2 INJECTION INTRAMUSCULAR; INTRAVENOUS at 23:09

## 2019-09-30 RX ADMIN — IPRATROPIUM BROMIDE AND ALBUTEROL SULFATE 1 AMPULE: .5; 3 SOLUTION RESPIRATORY (INHALATION) at 20:26

## 2019-09-30 RX ADMIN — ENOXAPARIN SODIUM 40 MG: 40 INJECTION SUBCUTANEOUS at 08:25

## 2019-09-30 RX ADMIN — HYDROMORPHONE HYDROCHLORIDE 1 MG: 1 INJECTION, SOLUTION INTRAMUSCULAR; INTRAVENOUS; SUBCUTANEOUS at 17:52

## 2019-09-30 RX ADMIN — HYDROMORPHONE HYDROCHLORIDE 1 MG: 1 INJECTION, SOLUTION INTRAMUSCULAR; INTRAVENOUS; SUBCUTANEOUS at 10:27

## 2019-09-30 RX ADMIN — SODIUM CHLORIDE: 9 INJECTION, SOLUTION INTRAVENOUS at 14:51

## 2019-09-30 RX ADMIN — HYDROMORPHONE HYDROCHLORIDE 1 MG: 1 INJECTION, SOLUTION INTRAMUSCULAR; INTRAVENOUS; SUBCUTANEOUS at 02:03

## 2019-09-30 RX ADMIN — ONDANSETRON 4 MG: 2 INJECTION INTRAMUSCULAR; INTRAVENOUS at 10:26

## 2019-09-30 RX ADMIN — Medication 10 ML: at 23:10

## 2019-09-30 ASSESSMENT — PAIN SCALES - GENERAL
PAINLEVEL_OUTOF10: 6
PAINLEVEL_OUTOF10: 5
PAINLEVEL_OUTOF10: 5
PAINLEVEL_OUTOF10: 6
PAINLEVEL_OUTOF10: 6
PAINLEVEL_OUTOF10: 7
PAINLEVEL_OUTOF10: 5
PAINLEVEL_OUTOF10: 5
PAINLEVEL_OUTOF10: 4
PAINLEVEL_OUTOF10: 7
PAINLEVEL_OUTOF10: 0
PAINLEVEL_OUTOF10: 7

## 2019-09-30 ASSESSMENT — PAIN DESCRIPTION - DESCRIPTORS: DESCRIPTORS: ACHING;CRAMPING

## 2019-09-30 ASSESSMENT — PAIN DESCRIPTION - ONSET: ONSET: ON-GOING

## 2019-09-30 ASSESSMENT — PAIN DESCRIPTION - PAIN TYPE: TYPE: ACUTE PAIN

## 2019-09-30 ASSESSMENT — PAIN DESCRIPTION - ORIENTATION: ORIENTATION: LEFT

## 2019-09-30 ASSESSMENT — PAIN DESCRIPTION - FREQUENCY: FREQUENCY: INTERMITTENT

## 2019-09-30 ASSESSMENT — PAIN DESCRIPTION - PROGRESSION: CLINICAL_PROGRESSION: NOT CHANGED

## 2019-09-30 ASSESSMENT — PAIN DESCRIPTION - LOCATION: LOCATION: ABDOMEN

## 2019-09-30 ASSESSMENT — PAIN - FUNCTIONAL ASSESSMENT: PAIN_FUNCTIONAL_ASSESSMENT: ACTIVITIES ARE NOT PREVENTED

## 2019-10-01 ENCOUNTER — TELEPHONE (OUTPATIENT)
Dept: INTERNAL MEDICINE | Age: 54
End: 2019-10-01

## 2019-10-01 VITALS
BODY MASS INDEX: 21.32 KG/M2 | SYSTOLIC BLOOD PRESSURE: 161 MMHG | HEIGHT: 76 IN | TEMPERATURE: 98.9 F | OXYGEN SATURATION: 99 % | DIASTOLIC BLOOD PRESSURE: 88 MMHG | WEIGHT: 175.1 LBS | RESPIRATION RATE: 20 BRPM | HEART RATE: 63 BPM

## 2019-10-01 PROCEDURE — 99212 OFFICE O/P EST SF 10 MIN: CPT | Performed by: SURGERY

## 2019-10-01 PROCEDURE — 2580000003 HC RX 258: Performed by: SURGERY

## 2019-10-01 PROCEDURE — G0378 HOSPITAL OBSERVATION PER HR: HCPCS

## 2019-10-01 PROCEDURE — 96372 THER/PROPH/DIAG INJ SC/IM: CPT

## 2019-10-01 PROCEDURE — 6360000002 HC RX W HCPCS: Performed by: SURGERY

## 2019-10-01 PROCEDURE — 99024 POSTOP FOLLOW-UP VISIT: CPT | Performed by: PHYSICIAN ASSISTANT

## 2019-10-01 PROCEDURE — 96376 TX/PRO/DX INJ SAME DRUG ADON: CPT

## 2019-10-01 PROCEDURE — 6370000000 HC RX 637 (ALT 250 FOR IP): Performed by: SURGERY

## 2019-10-01 RX ADMIN — SODIUM CHLORIDE: 9 INJECTION, SOLUTION INTRAVENOUS at 09:29

## 2019-10-01 RX ADMIN — ONDANSETRON 4 MG: 2 INJECTION INTRAMUSCULAR; INTRAVENOUS at 02:49

## 2019-10-01 RX ADMIN — HYDROMORPHONE HYDROCHLORIDE 1 MG: 1 INJECTION, SOLUTION INTRAMUSCULAR; INTRAVENOUS; SUBCUTANEOUS at 02:47

## 2019-10-01 RX ADMIN — ENOXAPARIN SODIUM 40 MG: 40 INJECTION SUBCUTANEOUS at 09:21

## 2019-10-01 ASSESSMENT — PAIN SCALES - GENERAL
PAINLEVEL_OUTOF10: 0
PAINLEVEL_OUTOF10: 8

## 2019-10-03 ENCOUNTER — TELEPHONE (OUTPATIENT)
Dept: INTERNAL MEDICINE | Age: 54
End: 2019-10-03

## 2019-10-09 ASSESSMENT — ENCOUNTER SYMPTOMS
ABDOMINAL PAIN: 1
SINUS PRESSURE: 0
BACK PAIN: 0
EYE REDNESS: 0
EYE DISCHARGE: 0
CONSTIPATION: 1
COUGH: 0
SHORTNESS OF BREATH: 0
ABDOMINAL DISTENTION: 1

## 2019-11-28 DIAGNOSIS — F31.81 BIPOLAR 2 DISORDER, MAJOR DEPRESSIVE EPISODE (HCC): ICD-10-CM

## 2019-12-02 RX ORDER — LAMOTRIGINE 200 MG/1
200 TABLET ORAL DAILY
Qty: 90 TABLET | Refills: 3 | Status: SHIPPED | OUTPATIENT
Start: 2019-12-02

## 2019-12-02 RX ORDER — BUPROPION HYDROCHLORIDE 300 MG/1
100 TABLET ORAL EVERY MORNING
Qty: 90 TABLET | Refills: 3 | Status: SHIPPED | OUTPATIENT
Start: 2019-12-02

## 2019-12-17 ENCOUNTER — OFFICE VISIT (OUTPATIENT)
Dept: INTERNAL MEDICINE | Age: 54
End: 2019-12-17
Payer: COMMERCIAL

## 2019-12-17 VITALS
OXYGEN SATURATION: 95 % | TEMPERATURE: 97.6 F | DIASTOLIC BLOOD PRESSURE: 96 MMHG | HEART RATE: 68 BPM | SYSTOLIC BLOOD PRESSURE: 160 MMHG

## 2019-12-17 DIAGNOSIS — J01.40 ACUTE NON-RECURRENT PANSINUSITIS: ICD-10-CM

## 2019-12-17 PROCEDURE — 96372 THER/PROPH/DIAG INJ SC/IM: CPT | Performed by: INTERNAL MEDICINE

## 2019-12-17 PROCEDURE — 99213 OFFICE O/P EST LOW 20 MIN: CPT | Performed by: INTERNAL MEDICINE

## 2019-12-17 RX ORDER — BENZONATATE 200 MG/1
200 CAPSULE ORAL 3 TIMES DAILY PRN
Qty: 30 CAPSULE | Refills: 0 | Status: SHIPPED | OUTPATIENT
Start: 2019-12-17 | End: 2019-12-27

## 2019-12-17 RX ORDER — AMOXICILLIN AND CLAVULANATE POTASSIUM 875; 125 MG/1; MG/1
1 TABLET, FILM COATED ORAL 2 TIMES DAILY
Qty: 20 TABLET | Refills: 0 | Status: SHIPPED | OUTPATIENT
Start: 2019-12-17 | End: 2019-12-27

## 2019-12-17 RX ORDER — METHYLPREDNISOLONE ACETATE 80 MG/ML
80 INJECTION, SUSPENSION INTRA-ARTICULAR; INTRALESIONAL; INTRAMUSCULAR; SOFT TISSUE ONCE
Status: COMPLETED | OUTPATIENT
Start: 2019-12-17 | End: 2019-12-17

## 2019-12-17 RX ADMIN — METHYLPREDNISOLONE ACETATE 80 MG: 80 INJECTION, SUSPENSION INTRA-ARTICULAR; INTRALESIONAL; INTRAMUSCULAR; SOFT TISSUE at 15:57

## 2019-12-17 ASSESSMENT — ENCOUNTER SYMPTOMS
SINUS PRESSURE: 1
COUGH: 1

## 2020-02-06 RX ORDER — PRUCALOPRIDE 2 MG/1
TABLET, FILM COATED ORAL
Qty: 30 TABLET | Refills: 3 | Status: SHIPPED | OUTPATIENT
Start: 2020-02-06 | End: 2020-06-16

## 2020-02-20 ENCOUNTER — OFFICE VISIT (OUTPATIENT)
Dept: INTERNAL MEDICINE | Age: 55
End: 2020-02-20
Payer: COMMERCIAL

## 2020-02-20 VITALS
WEIGHT: 176 LBS | OXYGEN SATURATION: 99 % | SYSTOLIC BLOOD PRESSURE: 140 MMHG | BODY MASS INDEX: 21.43 KG/M2 | HEIGHT: 76 IN | TEMPERATURE: 97.9 F | DIASTOLIC BLOOD PRESSURE: 90 MMHG | HEART RATE: 84 BPM

## 2020-02-20 PROCEDURE — 99213 OFFICE O/P EST LOW 20 MIN: CPT | Performed by: INTERNAL MEDICINE

## 2020-02-20 NOTE — PROGRESS NOTES
Smokeless tobacco: Never Used    Tobacco comment: 4 cigarettes per day   Substance and Sexual Activity    Alcohol use: No    Drug use: No    Sexual activity: Not on file   Lifestyle    Physical activity:     Days per week: Not on file     Minutes per session: Not on file    Stress: Not on file   Relationships    Social connections:     Talks on phone: Not on file     Gets together: Not on file     Attends Orthodoxy service: Not on file     Active member of club or organization: Not on file     Attends meetings of clubs or organizations: Not on file     Relationship status: Not on file    Intimate partner violence:     Fear of current or ex partner: Not on file     Emotionally abused: Not on file     Physically abused: Not on file     Forced sexual activity: Not on file   Other Topics Concern    Not on file   Social History Narrative    Not on file       No Known Allergies    Current Outpatient Medications   Medication Sig Dispense Refill    MOTEGRITY 2 MG TABS TAKE 1 TABLET BY MOUTH DAILY 30 tablet 3    buPROPion (WELLBUTRIN XL) 300 MG extended release tablet TAKE 1 TABLET BY MOUTH EVERY MORNING 90 tablet 3    lamoTRIgine (LAMICTAL) 200 MG tablet TAKE 1 TABLET BY MOUTH DAILY 90 tablet 3    SUMAtriptan (IMITREX) 100 MG tablet Take 1 tablet by mouth as needed for Migraine 9 tablet 3    losartan-hydrochlorothiazide (HYZAAR) 50-12.5 MG per tablet TAKE 1 TABLET BY MOUTH EVERY DAY. 90 tablet 3    RABEprazole (ACIPHEX) 20 MG tablet TAKE 1 TABLET DAILY 90 tablet 3    folic acid (FOLVITE) 624 MCG tablet Take 400 mcg by mouth daily      aspirin 325 MG tablet Take 325 mg by mouth daily      Multiple Vitamins-Minerals (MULTIVITAMIN ADULT PO) Take 1 tablet by mouth daily       LORazepam (ATIVAN) 0.5 MG tablet TAKE 1 TABLET BY MOUTH EVERY 8 HOURS AS NEEDED FOR ANXIETY (Patient taking differently: Take 0.5 mg by mouth 3 times daily. ) 90 tablet 0     No current facility-administered medications for this visit. Absolute 8.9 (H) 1.5 - 7.5 K/uL    Immature Granulocytes # 0.0 K/uL    Lymphocytes Absolute 1.2 1.1 - 4.5 K/uL    Monocytes Absolute 1.10 (H) 0.00 - 0.90 K/uL    Eosinophils Absolute 0.20 0.00 - 0.60 K/uL    Basophils Absolute 0.10 0.00 - 0.20 K/uL   Comprehensive Metabolic Panel   Result Value Ref Range    Sodium 144 136 - 145 mmol/L    Potassium 4.5 3.5 - 5.0 mmol/L    Chloride 103 98 - 111 mmol/L    CO2 29 22 - 29 mmol/L    Anion Gap 12 7 - 19 mmol/L    Glucose 95 74 - 109 mg/dL    BUN 13 6 - 20 mg/dL    CREATININE 1.1 0.5 - 1.2 mg/dL    GFR Non-African American >60 >60    Calcium 9.8 8.6 - 10.0 mg/dL    Total Protein 7.0 6.6 - 8.7 g/dL    Alb 4.9 3.5 - 5.2 g/dL    Total Bilirubin 0.3 0.2 - 1.2 mg/dL    Alkaline Phosphatase 96 40 - 130 U/L    ALT 20 5 - 41 U/L    AST 25 5 - 40 U/L   Urinalysis Reflex to Culture   Result Value Ref Range    Color, UA YELLOW Straw/Yellow    Clarity, UA TURBID (A) Clear    Glucose, Ur Negative Negative mg/dL    Bilirubin Urine Negative Negative    Ketones, Urine Negative Negative mg/dL    Specific Gravity, UA 1.027 1.005 - 1.030    Blood, Urine Negative Negative    pH, UA 8.0 5.0 - 8.0    Protein, UA Negative Negative mg/dL    Urobilinogen, Urine 0.2 <2.0 E.U./dL    Nitrite, Urine Negative Negative    Leukocyte Esterase, Urine Negative Negative    Urine Reflex to Culture YES        ASSESSMENT/ PLAN:  1. Viral URI  Continue with symptomatic care increase fluids if worsening or changing symptoms let us know    2. Callus of foot  Warm water Epson salts  - External Referral To Podiatry    3. Essential hypertension  Pressure elevated need to watch this closely follow-up  - CBC; Future  - Comprehensive Metabolic Panel; Future  - TSH without Reflex; Future  - Lipid Panel;  Future

## 2020-06-16 RX ORDER — PRUCALOPRIDE 2 MG/1
TABLET, FILM COATED ORAL
Qty: 30 TABLET | Refills: 3 | Status: SHIPPED | OUTPATIENT
Start: 2020-06-16 | End: 2020-10-13 | Stop reason: SDUPTHER

## 2020-06-29 RX ORDER — RABEPRAZOLE SODIUM 20 MG/1
TABLET, DELAYED RELEASE ORAL
Qty: 90 TABLET | Refills: 3 | Status: SHIPPED | OUTPATIENT
Start: 2020-06-29 | End: 2021-06-11

## 2020-07-20 RX ORDER — LOSARTAN POTASSIUM AND HYDROCHLOROTHIAZIDE 12.5; 5 MG/1; MG/1
TABLET ORAL
Qty: 90 TABLET | Refills: 3 | Status: SHIPPED | OUTPATIENT
Start: 2020-07-20 | End: 2020-08-21

## 2020-08-20 ENCOUNTER — TELEPHONE (OUTPATIENT)
Dept: INTERNAL MEDICINE | Age: 55
End: 2020-08-20

## 2020-08-20 NOTE — TELEPHONE ENCOUNTER
Vignesh requests that soledad's nurse return their call. Has appt scheduled 8/21, has not has labs. Wants to know if he should reschedule. Is not fasting today. The best time to reach him is Anytime. Thank you.

## 2020-08-21 ENCOUNTER — OFFICE VISIT (OUTPATIENT)
Dept: INTERNAL MEDICINE | Age: 55
End: 2020-08-21
Payer: COMMERCIAL

## 2020-08-21 VITALS
HEIGHT: 76 IN | WEIGHT: 186 LBS | OXYGEN SATURATION: 98 % | SYSTOLIC BLOOD PRESSURE: 150 MMHG | DIASTOLIC BLOOD PRESSURE: 90 MMHG | HEART RATE: 86 BPM | BODY MASS INDEX: 22.65 KG/M2

## 2020-08-21 DIAGNOSIS — Z12.5 SCREENING FOR PROSTATE CANCER: ICD-10-CM

## 2020-08-21 DIAGNOSIS — I10 ESSENTIAL HYPERTENSION: Chronic | ICD-10-CM

## 2020-08-21 DIAGNOSIS — Z87.19 HISTORY OF PANCREATITIS: ICD-10-CM

## 2020-08-21 LAB
ALBUMIN SERPL-MCNC: 4.7 G/DL (ref 3.5–5.2)
ALP BLD-CCNC: 71 U/L (ref 40–130)
ALT SERPL-CCNC: 23 U/L (ref 5–41)
AMYLASE: 120 U/L (ref 28–100)
ANION GAP SERPL CALCULATED.3IONS-SCNC: 15 MMOL/L (ref 7–19)
AST SERPL-CCNC: 29 U/L (ref 5–40)
BILIRUB SERPL-MCNC: <0.2 MG/DL (ref 0.2–1.2)
BUN BLDV-MCNC: 15 MG/DL (ref 6–20)
CALCIUM SERPL-MCNC: 10 MG/DL (ref 8.6–10)
CHLORIDE BLD-SCNC: 102 MMOL/L (ref 98–111)
CHOLESTEROL, TOTAL: 194 MG/DL (ref 160–199)
CO2: 26 MMOL/L (ref 22–29)
CREAT SERPL-MCNC: 1.4 MG/DL (ref 0.5–1.2)
GFR AFRICAN AMERICAN: >59
GFR NON-AFRICAN AMERICAN: 52
GLUCOSE BLD-MCNC: 108 MG/DL (ref 74–109)
HCT VFR BLD CALC: 47 % (ref 42–52)
HDLC SERPL-MCNC: 98 MG/DL (ref 55–121)
HEMOGLOBIN: 15 G/DL (ref 14–18)
LDL CHOLESTEROL CALCULATED: 87 MG/DL
LIPASE: 79 U/L (ref 13–60)
MCH RBC QN AUTO: 29.5 PG (ref 27–31)
MCHC RBC AUTO-ENTMCNC: 31.9 G/DL (ref 33–37)
MCV RBC AUTO: 92.3 FL (ref 80–94)
PDW BLD-RTO: 13.3 % (ref 11.5–14.5)
PLATELET # BLD: 233 K/UL (ref 130–400)
PMV BLD AUTO: 10.6 FL (ref 9.4–12.4)
POTASSIUM SERPL-SCNC: 4.9 MMOL/L (ref 3.5–5)
PROSTATE SPECIFIC ANTIGEN: 2.22 NG/ML (ref 0–4)
RBC # BLD: 5.09 M/UL (ref 4.7–6.1)
SODIUM BLD-SCNC: 143 MMOL/L (ref 136–145)
TOTAL PROTEIN: 7 G/DL (ref 6.6–8.7)
TRIGL SERPL-MCNC: 47 MG/DL (ref 0–149)
TSH SERPL DL<=0.05 MIU/L-ACNC: 2.89 UIU/ML (ref 0.27–4.2)
WBC # BLD: 6.5 K/UL (ref 4.8–10.8)

## 2020-08-21 PROCEDURE — 99396 PREV VISIT EST AGE 40-64: CPT | Performed by: INTERNAL MEDICINE

## 2020-08-21 RX ORDER — LOSARTAN POTASSIUM AND HYDROCHLOROTHIAZIDE 25; 100 MG/1; MG/1
1 TABLET ORAL DAILY
Qty: 90 TABLET | Refills: 3 | Status: SHIPPED | OUTPATIENT
Start: 2020-08-21 | End: 2021-08-03

## 2020-08-21 NOTE — PROGRESS NOTES
on file   Lifestyle    Physical activity     Days per week: Not on file     Minutes per session: Not on file    Stress: Not on file   Relationships    Social connections     Talks on phone: Not on file     Gets together: Not on file     Attends Scientologist service: Not on file     Active member of club or organization: Not on file     Attends meetings of clubs or organizations: Not on file     Relationship status: Not on file    Intimate partner violence     Fear of current or ex partner: Not on file     Emotionally abused: Not on file     Physically abused: Not on file     Forced sexual activity: Not on file   Other Topics Concern    Not on file   Social History Narrative    Not on file       No Known Allergies    Current Outpatient Medications   Medication Sig Dispense Refill    losartan-hydroCHLOROthiazide (HYZAAR) 100-25 MG per tablet Take 1 tablet by mouth daily 90 tablet 3    RABEprazole (ACIPHEX) 20 MG tablet TAKE 1 TABLET DAILY 90 tablet 3    MOTEGRITY 2 MG TABS TAKE 1 TABLET BY MOUTH DAILY 30 tablet 3    buPROPion (WELLBUTRIN XL) 300 MG extended release tablet TAKE 1 TABLET BY MOUTH EVERY MORNING 90 tablet 3    lamoTRIgine (LAMICTAL) 200 MG tablet TAKE 1 TABLET BY MOUTH DAILY 90 tablet 3    SUMAtriptan (IMITREX) 100 MG tablet Take 1 tablet by mouth as needed for Migraine 9 tablet 3    folic acid (FOLVITE) 704 MCG tablet Take 400 mcg by mouth daily      aspirin 325 MG tablet Take 325 mg by mouth daily      Multiple Vitamins-Minerals (MULTIVITAMIN ADULT PO) Take 1 tablet by mouth daily       LORazepam (ATIVAN) 0.5 MG tablet TAKE 1 TABLET BY MOUTH EVERY 8 HOURS AS NEEDED FOR ANXIETY (Patient taking differently: Take 0.5 mg by mouth 3 times daily. ) 90 tablet 0     No current facility-administered medications for this visit. Review of Systems   Constitutional: Negative for chills, fatigue and fever. HENT: Negative for congestion and sinus pressure.     Eyes: Negative for discharge and redness. Respiratory: Negative for cough and shortness of breath. Cardiovascular: Negative for chest pain, palpitations and leg swelling. Gastrointestinal: Positive for constipation. Genitourinary: Negative for dysuria, frequency and urgency. Musculoskeletal: Negative for arthralgias and back pain. Skin: Negative for rash and wound. Neurological: Negative for dizziness, light-headedness and headaches. Psychiatric/Behavioral: Negative for dysphoric mood and sleep disturbance. The patient is not nervous/anxious. BP (!) 150/90   Pulse 86   Ht 6' 4\" (1.93 m)   Wt 186 lb (84.4 kg)   SpO2 98%   BMI 22.64 kg/m²   BP Readings from Last 7 Encounters:   08/21/20 (!) 150/90   02/20/20 (!) 140/90   12/17/19 (!) 160/96   10/01/19 (!) 161/88   09/27/19 130/88   07/15/19 120/78   03/14/19 134/79     Wt Readings from Last 7 Encounters:   08/21/20 186 lb (84.4 kg)   02/20/20 176 lb (79.8 kg)   09/30/19 175 lb 1.6 oz (79.4 kg)   09/27/19 176 lb (79.8 kg)   07/15/19 176 lb (79.8 kg)   03/14/19 185 lb (83.9 kg)   10/25/18 178 lb 12.8 oz (81.1 kg)     BMI Readings from Last 7 Encounters:   08/21/20 22.64 kg/m²   02/20/20 21.42 kg/m²   09/30/19 21.31 kg/m²   09/27/19 21.42 kg/m²   07/15/19 21.42 kg/m²   03/14/19 22.52 kg/m²   10/25/18 21.76 kg/m²     Resp Readings from Last 7 Encounters:   10/01/19 20   09/27/19 18   03/14/19 16   09/01/18 18   11/09/17 18   06/17/17 20       Physical Exam  Constitutional:       General: He is not in acute distress. Appearance: Normal appearance. He is well-developed. HENT:      Right Ear: External ear normal. Tympanic membrane is not injected. Left Ear: External ear normal. Tympanic membrane is not injected. Mouth/Throat:      Pharynx: No oropharyngeal exudate. Eyes:      General: No scleral icterus. Conjunctiva/sclera: Conjunctivae normal.   Neck:      Musculoskeletal: Neck supple. Thyroid: No thyroid mass or thyromegaly.       Vascular: No carotid bruit. Cardiovascular:      Rate and Rhythm: Normal rate and regular rhythm. Heart sounds: S1 normal and S2 normal. No murmur. No S3 or S4 sounds. Pulmonary:      Effort: Pulmonary effort is normal. No respiratory distress. Breath sounds: Normal breath sounds. No wheezing or rales. Abdominal:      General: Bowel sounds are normal. There is no distension. Palpations: Abdomen is soft. There is no mass. Tenderness: There is no abdominal tenderness. Lymphadenopathy:      Cervical: No cervical adenopathy. Upper Body:      Right upper body: No supraclavicular adenopathy. Left upper body: No supraclavicular adenopathy. Skin:     Findings: No rash. Neurological:      Mental Status: He is alert and oriented to person, place, and time. Cranial Nerves: No cranial nerve deficit.          Results for orders placed or performed in visit on 08/21/20   Lipid Panel   Result Value Ref Range    Cholesterol, Total 194 160 - 199 mg/dL    Triglycerides 47 0 - 149 mg/dL    HDL 98 55 - 121 mg/dL    LDL Calculated 87 <100 mg/dL   TSH without Reflex   Result Value Ref Range    TSH 2.890 0.270 - 4.200 uIU/mL   Comprehensive Metabolic Panel   Result Value Ref Range    Sodium 143 136 - 145 mmol/L    Potassium 4.9 3.5 - 5.0 mmol/L    Chloride 102 98 - 111 mmol/L    CO2 26 22 - 29 mmol/L    Anion Gap 15 7 - 19 mmol/L    Glucose 108 74 - 109 mg/dL    BUN 15 6 - 20 mg/dL    CREATININE 1.4 (H) 0.5 - 1.2 mg/dL    GFR Non-African American 52 (A) >60    GFR African American >59 >59    Calcium 10.0 8.6 - 10.0 mg/dL    Total Protein 7.0 6.6 - 8.7 g/dL    Alb 4.7 3.5 - 5.2 g/dL    Total Bilirubin <0.2 0.2 - 1.2 mg/dL    Alkaline Phosphatase 71 40 - 130 U/L    ALT 23 5 - 41 U/L    AST 29 5 - 40 U/L   CBC   Result Value Ref Range    WBC 6.5 4.8 - 10.8 K/uL    RBC 5.09 4.70 - 6.10 M/uL    Hemoglobin 15.0 14.0 - 18.0 g/dL    Hematocrit 47.0 42.0 - 52.0 %    MCV 92.3 80.0 - 94.0 fL    MCH 29.5 27.0 - 31.0 pg

## 2020-08-30 PROBLEM — K57.92 DIVERTICULITIS: Status: RESOLVED | Noted: 2018-08-28 | Resolved: 2020-08-30

## 2020-08-30 PROBLEM — J01.40 ACUTE NON-RECURRENT PANSINUSITIS: Status: RESOLVED | Noted: 2019-12-17 | Resolved: 2020-08-30

## 2020-08-30 ASSESSMENT — ENCOUNTER SYMPTOMS
BACK PAIN: 0
SINUS PRESSURE: 0
SHORTNESS OF BREATH: 0
COUGH: 0
CONSTIPATION: 1
EYE REDNESS: 0
EYE DISCHARGE: 0

## 2020-10-13 RX ORDER — PRUCALOPRIDE 2 MG/1
TABLET, FILM COATED ORAL
Qty: 30 TABLET | Refills: 3 | Status: SHIPPED | OUTPATIENT
Start: 2020-10-13 | End: 2020-11-23 | Stop reason: SDUPTHER

## 2020-11-12 ENCOUNTER — TELEPHONE (OUTPATIENT)
Dept: INTERNAL MEDICINE | Age: 55
End: 2020-11-12

## 2020-11-23 RX ORDER — PRUCALOPRIDE 2 MG/1
TABLET, FILM COATED ORAL
Qty: 30 TABLET | Refills: 3 | Status: SHIPPED | OUTPATIENT
Start: 2020-11-23 | End: 2021-09-27

## 2020-12-17 ENCOUNTER — OFFICE VISIT (OUTPATIENT)
Dept: INTERNAL MEDICINE | Age: 55
End: 2020-12-17
Payer: COMMERCIAL

## 2020-12-17 VITALS
BODY MASS INDEX: 22.16 KG/M2 | HEART RATE: 90 BPM | WEIGHT: 182 LBS | DIASTOLIC BLOOD PRESSURE: 88 MMHG | SYSTOLIC BLOOD PRESSURE: 134 MMHG | HEIGHT: 76 IN | OXYGEN SATURATION: 98 %

## 2020-12-17 PROCEDURE — 99213 OFFICE O/P EST LOW 20 MIN: CPT | Performed by: INTERNAL MEDICINE

## 2020-12-17 SDOH — ECONOMIC STABILITY: INCOME INSECURITY: HOW HARD IS IT FOR YOU TO PAY FOR THE VERY BASICS LIKE FOOD, HOUSING, MEDICAL CARE, AND HEATING?: NOT HARD AT ALL

## 2020-12-17 SDOH — ECONOMIC STABILITY: FOOD INSECURITY: WITHIN THE PAST 12 MONTHS, YOU WORRIED THAT YOUR FOOD WOULD RUN OUT BEFORE YOU GOT MONEY TO BUY MORE.: NEVER TRUE

## 2020-12-17 SDOH — ECONOMIC STABILITY: TRANSPORTATION INSECURITY
IN THE PAST 12 MONTHS, HAS THE LACK OF TRANSPORTATION KEPT YOU FROM MEDICAL APPOINTMENTS OR FROM GETTING MEDICATIONS?: NOT ASKED

## 2020-12-17 SDOH — ECONOMIC STABILITY: TRANSPORTATION INSECURITY
IN THE PAST 12 MONTHS, HAS LACK OF TRANSPORTATION KEPT YOU FROM MEETINGS, WORK, OR FROM GETTING THINGS NEEDED FOR DAILY LIVING?: NOT ASKED

## 2020-12-17 SDOH — ECONOMIC STABILITY: FOOD INSECURITY: WITHIN THE PAST 12 MONTHS, THE FOOD YOU BOUGHT JUST DIDN'T LAST AND YOU DIDN'T HAVE MONEY TO GET MORE.: NEVER TRUE

## 2020-12-17 NOTE — PROGRESS NOTES
Chief Complaint   Patient presents with    3 Month Follow-Up    Hypertension       HPI: Patient is here today to follow-up hypertension blood pressure was noted at the last visit still running a little higher than we would like he is been under stress discussed this today social stress he also has a history of bipolar affective disorder with depression he is having increased anxiety with stressors. I encouraged him to talk with his psychiatrist.  Ritchie Fraser feels well otherwise. No cp or dyspnea or abd pain. Stable ongoing difficulty bm.       Past Medical History:   Diagnosis Date    Arthritis     Bipolar disorder ([address]     BPH (benign prostatic hypertrophy)     Constipation     Depression     GERD (gastroesophageal reflux disease)     Headache(784.0)     History of blood transfusion     Hypertension     Pelvic floor dysfunction        Past Surgical History:   Procedure Laterality Date    ACHILLES TENDON SURGERY      CHOLECYSTECTOMY      COLONOSCOPY N/A 3/14/2019    Dr Roel Davis yr recall    ENDOSCOPY, COLON, DIAGNOSTIC      JOINT REPLACEMENT      left ankle replacement    TONSILLECTOMY AND ADENOIDECTOMY         Family History   Problem Relation Age of Onset    Colon Cancer Neg Hx     Colon Polyps Neg Hx     Liver Cancer Neg Hx     Liver Disease Neg Hx     Esophageal Cancer Neg Hx     Stomach Cancer Neg Hx     Rectal Cancer Neg Hx        Social History     Socioeconomic History    Marital status:      Spouse name: Not on file    Number of children: Not on file    Years of education: Not on file    Highest education level: Not on file   Occupational History    Not on file   Social Needs    Financial resource strain: Not hard at all   [employer] insecurity     Worry: Never true     Inability: Never true   [employer] needs     Medical: Not on file     Non-medical: Not on file   Tobacco Use    Smoking status: Current Every Day Smoker     Packs/day: 0.25 Years: 30.00     Pack years: 7.50     Types: Cigarettes    Smokeless tobacco: Never Used    Tobacco comment: 4 cigarettes per day   Substance and Sexual Activity    Alcohol use: No    Drug use: No    Sexual activity: Not on file   Lifestyle    Physical activity     Days per week: Not on file     Minutes per session: Not on file    Stress: Not on file   Relationships    Social connections     Talks on phone: Not on file     Gets together: Not on file     Attends Restorationist service: Not on file     Active member of club or organization: Not on file     Attends meetings of clubs or organizations: Not on file     Relationship status: Not on file    Intimate partner violence     Fear of current or ex partner: Not on file     Emotionally abused: Not on file     Physically abused: Not on file     Forced sexual activity: Not on file   Other Topics Concern    Not on file   Social History Narrative    Not on file       No Known Allergies    Current Outpatient Medications   Medication Sig Dispense Refill    Prucalopride Succinate (MOTEGRITY) 2 MG TABS TAKE 1 TABLET BY MOUTH DAILY 30 tablet 3    losartan-hydroCHLOROthiazide (HYZAAR) 100-25 MG per tablet Take 1 tablet by mouth daily 90 tablet 3    RABEprazole (ACIPHEX) 20 MG tablet TAKE 1 TABLET DAILY 90 tablet 3    buPROPion (WELLBUTRIN XL) 300 MG extended release tablet TAKE 1 TABLET BY MOUTH EVERY MORNING 90 tablet 3    lamoTRIgine (LAMICTAL) 200 MG tablet TAKE 1 TABLET BY MOUTH DAILY 90 tablet 3    SUMAtriptan (IMITREX) 100 MG tablet Take 1 tablet by mouth as needed for Migraine 9 tablet 3    folic acid (FOLVITE) 968 MCG tablet Take 400 mcg by mouth daily      aspirin 325 MG tablet Take 325 mg by mouth daily      Multiple Vitamins-Minerals (MULTIVITAMIN ADULT PO) Take 1 tablet by mouth daily       LORazepam (ATIVAN) 0.5 MG tablet TAKE 1 TABLET BY MOUTH EVERY 8 HOURS AS NEEDED FOR ANXIETY (Patient taking differently: Take 0.5 mg by mouth 3 times daily. ) 90 tablet 0     No current facility-administered medications for this visit. Review of Systems   Constitutional: Negative for chills, fatigue and fever. HENT: Negative for congestion and sinus pressure. Eyes: Negative for discharge and redness. Respiratory: Negative for cough and shortness of breath. Cardiovascular: Negative for chest pain, palpitations and leg swelling. Gastrointestinal: Positive for constipation. Genitourinary: Negative for dysuria, frequency and urgency. Musculoskeletal: Negative for arthralgias and back pain. Skin: Negative for rash and wound. Neurological: Negative for dizziness, light-headedness and headaches. Psychiatric/Behavioral: Positive for sleep disturbance. Negative for dysphoric mood. The patient is nervous/anxious. /88 (Site: Left Upper Arm)   Pulse 90   Ht 6' 4\" (1.93 m)   Wt 182 lb (82.6 kg)   SpO2 98%   BMI 22.15 kg/m²   BP Readings from Last 7 Encounters:   12/17/20 134/88   08/21/20 (!) 150/90   02/20/20 (!) 140/90   12/17/19 (!) 160/96   10/01/19 (!) 161/88   09/27/19 130/88   07/15/19 120/78     Wt Readings from Last 7 Encounters:   12/17/20 182 lb (82.6 kg)   08/21/20 186 lb (84.4 kg)   02/20/20 176 lb (79.8 kg)   09/30/19 175 lb 1.6 oz (79.4 kg)   09/27/19 176 lb (79.8 kg)   07/15/19 176 lb (79.8 kg)   03/14/19 185 lb (83.9 kg)     BMI Readings from Last 7 Encounters:   12/17/20 22.15 kg/m²   08/21/20 22.64 kg/m²   02/20/20 21.42 kg/m²   09/30/19 21.31 kg/m²   09/27/19 21.42 kg/m²   07/15/19 21.42 kg/m²   03/14/19 22.52 kg/m²     Resp Readings from Last 7 Encounters:   10/01/19 20   09/27/19 18   03/14/19 16   09/01/18 18   11/09/17 18   06/17/17 20       Physical Exam  Constitutional:       General: He is not in acute distress. Eyes:      General: No scleral icterus. Neck:      Musculoskeletal: Neck supple. Cardiovascular:      Heart sounds: Normal heart sounds. Pulmonary:      Breath sounds: Normal breath sounds. Lymphadenopathy:      Cervical: No cervical adenopathy. Skin:     Findings: No rash. Psychiatric:         Mood and Affect: Mood is anxious. Results for orders placed or performed in visit on 08/21/20   Psa screening   Result Value Ref Range    PSA 2.22 0.00 - 4.00 ng/mL   Amylase   Result Value Ref Range    Amylase 120 (H) 28 - 100 U/L   Lipase   Result Value Ref Range    Lipase 79 (H) 13 - 60 U/L       ASSESSMENT/ PLAN:  1. Essential hypertension  Watch bp and f/u closely and monitor-- may need increase meds but will first work on improving anxiety and sleep    2. Bipolar 2 disorder ([facility] [address]  I encouraged him to call Dr Julián Ramirez this week with increased anxiety and extreme stressors and with holidays    3.  Pelvic floor dysfunction  stable

## 2020-12-27 ASSESSMENT — ENCOUNTER SYMPTOMS
EYE DISCHARGE: 0
SHORTNESS OF BREATH: 0
COUGH: 0
EYE REDNESS: 0
SINUS PRESSURE: 0
CONSTIPATION: 1
BACK PAIN: 0

## 2021-03-04 ENCOUNTER — TELEPHONE (OUTPATIENT)
Dept: INTERNAL MEDICINE | Age: 56
End: 2021-03-04
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
## 2021-03-05 NOTE — TELEPHONE ENCOUNTER
We would still recommend getting it just let them know that when he goes to schedule the vaccine but we would still recommend it

## 2021-03-25 ENCOUNTER — OFFICE VISIT (OUTPATIENT)
Dept: INTERNAL MEDICINE | Age: 56
End: 2021-03-25
Payer: COMMERCIAL

## 2021-03-25 VITALS
BODY MASS INDEX: 21.8 KG/M2 | SYSTOLIC BLOOD PRESSURE: 130 MMHG | HEIGHT: 76 IN | OXYGEN SATURATION: 97 % | WEIGHT: 179 LBS | DIASTOLIC BLOOD PRESSURE: 80 MMHG | HEART RATE: 70 BPM

## 2021-03-25 DIAGNOSIS — I10 ESSENTIAL HYPERTENSION: Primary | Chronic | ICD-10-CM

## 2021-03-25 DIAGNOSIS — M62.89 PELVIC FLOOR DYSFUNCTION: Chronic | ICD-10-CM

## 2021-03-25 DIAGNOSIS — F31.81 BIPOLAR 2 DISORDER (HCC): ICD-10-CM

## 2021-03-25 PROCEDURE — 99213 OFFICE O/P EST LOW 20 MIN: CPT | Performed by: INTERNAL MEDICINE

## 2021-03-25 RX ORDER — SILDENAFIL 50 MG/1
50 TABLET, FILM COATED ORAL PRN
Qty: 30 TABLET | Refills: 0 | Status: SHIPPED | OUTPATIENT
Start: 2021-03-25 | End: 2022-04-04

## 2021-03-25 RX ORDER — AMLODIPINE BESYLATE 5 MG/1
5 TABLET ORAL NIGHTLY
Qty: 30 TABLET | Refills: 5 | Status: SHIPPED | OUTPATIENT
Start: 2021-03-25 | End: 2021-08-24

## 2021-06-11 RX ORDER — RABEPRAZOLE SODIUM 20 MG/1
TABLET, DELAYED RELEASE ORAL
Qty: 90 TABLET | Refills: 3 | Status: SHIPPED | OUTPATIENT
Start: 2021-06-11 | End: 2022-05-31

## 2021-06-11 NOTE — TELEPHONE ENCOUNTER
Rohit Valdivia called requesting a refill of the below medication which has been pended for you:     Requested Prescriptions     Pending Prescriptions Disp Refills    RABEprazole (ACIPHEX) 20 MG tablet [Pharmacy Med Name: RABEPRAZOLE  TAB 20MG DR] 90 tablet 3     Sig: TAKE 1 TABLET DAILY       Last Appointment Date: 3/25/2021  Next Appointment Date: 9/27/2021    No Known Allergies

## 2021-06-21 ENCOUNTER — TELEPHONE (OUTPATIENT)
Dept: INTERNAL MEDICINE | Age: 56
End: 2021-06-21

## 2021-06-21 NOTE — TELEPHONE ENCOUNTER
He is going out of town on Thursday and ativan can not be filled until Thursday. He is asking if you can send it in to be filled on Wednesday?   Wai

## 2021-06-21 NOTE — TELEPHONE ENCOUNTER
Please run a conrad on him -- since Dr Shannen Saeed has been filling ativan he will need to write it

## 2021-08-03 DIAGNOSIS — I10 ESSENTIAL HYPERTENSION: Chronic | ICD-10-CM

## 2021-08-03 RX ORDER — LOSARTAN POTASSIUM AND HYDROCHLOROTHIAZIDE 25; 100 MG/1; MG/1
TABLET ORAL
Qty: 90 TABLET | Refills: 3 | Status: SHIPPED | OUTPATIENT
Start: 2021-08-03 | End: 2022-07-25

## 2021-08-05 ENCOUNTER — OFFICE VISIT (OUTPATIENT)
Dept: INTERNAL MEDICINE | Age: 56
End: 2021-08-05
Payer: COMMERCIAL

## 2021-08-05 VITALS
OXYGEN SATURATION: 98 % | DIASTOLIC BLOOD PRESSURE: 84 MMHG | TEMPERATURE: 99.1 F | HEART RATE: 84 BPM | SYSTOLIC BLOOD PRESSURE: 138 MMHG

## 2021-08-05 DIAGNOSIS — R05.9 COUGH: ICD-10-CM

## 2021-08-05 DIAGNOSIS — R43.0 LOSS OF SMELL: ICD-10-CM

## 2021-08-05 LAB — SARS-COV-2, PCR: DETECTED

## 2021-08-05 PROCEDURE — 99212 OFFICE O/P EST SF 10 MIN: CPT | Performed by: NURSE PRACTITIONER

## 2021-08-05 ASSESSMENT — ENCOUNTER SYMPTOMS
DIARRHEA: 0
CONSTIPATION: 0
ABDOMINAL DISTENTION: 0
TROUBLE SWALLOWING: 0
VOMITING: 0
SORE THROAT: 0
CHOKING: 0
EYE ITCHING: 0
STRIDOR: 0
COLOR CHANGE: 0
ABDOMINAL PAIN: 0
COUGH: 1
BLOOD IN STOOL: 0
WHEEZING: 0
NAUSEA: 0
EYE DISCHARGE: 0
SHORTNESS OF BREATH: 0

## 2021-08-05 NOTE — PROGRESS NOTES
[address] [facility]  [address] [address] [place] [address] [address] [address]  Dept: [phone]  Dept Fax: [phone]: [phone]    Baldemar Zepeda (:  1965) is a 60 y.o. male,Established patient, here for evaluation of the following chief complaint(s): Cough (since tuesday) and Fever      Baldemar Zepeda is a 60 y.o. male who presents today for his medical conditions/complaints as noted below. Baldemar Zepeda is c/dilcia Cough (since tuesday) and Fever        HPI:     Chief Complaint   Patient presents with    Cough     since tuesday    Fever     HPI   1. Cough  Nonproductive body aches  He has been ill since Tues  ; he reallly felt bad yesterday   2.   Loss of taste and smell   He had J&J in March      Past Medical History:   Diagnosis Date    Arthritis     Bipolar disorder ([address]     BPH (benign prostatic hypertrophy)     Constipation     Depression     GERD (gastroesophageal reflux disease)     Headache(784.0)     History of blood transfusion     Hypertension     Pelvic floor dysfunction       Past Surgical History:   Procedure Laterality Date    ACHILLES TENDON SURGERY      CHOLECYSTECTOMY      COLONOSCOPY N/A 3/14/2019    Dr Mane Peña yr recall    ENDOSCOPY, COLON, DIAGNOSTIC      JOINT REPLACEMENT      left ankle replacement    TONSILLECTOMY AND ADENOIDECTOMY         Vitals 2021 3/25/2021 2020 2020 2020    SYSTOLIC 913 [phone] 116 608   DIASTOLIC 84 80 88 90 90 90   Site - - Left Upper Arm - - Left Upper Arm   Pulse 84 70 90 86 - 84   Temp 99.1 - - - - 97.9   Resp - - - - - -   SpO2 98 97 98 98 - 99   Weight - 179 lb 182 lb 186 lb - 176 lb   Height - 6' 4\" 6' 4\" 6' 4\" - 6' 4\"   Body mass index - 21.79 kg/m2 22.15 kg/m2 22.64 kg/m2 - 21.42 kg/m2   Pain Level - - - - - -   Some recent data might be hidden       Family History   Problem Relation Age of Onset    Colon Cancer Neg Hx     Colon Polyps Neg Hx     Liver Cancer Neg Hx     Liver Disease Neg Hx     Esophageal Cancer Neg Hx     Stomach Cancer Neg Hx     Rectal Cancer Neg Hx        Social History     Tobacco Use    Smoking status: Current Every Day Smoker     Packs/day: 0.25     Years: 30.00     Pack years: 7.50     Types: Cigarettes    Smokeless tobacco: Never Used    Tobacco comment: 4 cigarettes per day   Substance Use Topics    Alcohol use: No      Current Outpatient Medications   Medication Sig Dispense Refill    losartan-hydroCHLOROthiazide (HYZAAR) 100-25 MG per tablet TAKE 1 TABLET DAILY 90 tablet 3    RABEprazole (ACIPHEX) 20 MG tablet TAKE 1 TABLET DAILY 90 tablet 3    amLODIPine (NORVASC) 5 MG tablet Take 1 tablet by mouth nightly 30 tablet 5    sildenafil (VIAGRA) 50 MG tablet Take 1 tablet by mouth as needed for Erectile Dysfunction (do not exceed 1 in 24h) 30 tablet 0    Prucalopride Succinate (MOTEGRITY) 2 MG TABS TAKE 1 TABLET BY MOUTH DAILY 30 tablet 3    buPROPion (WELLBUTRIN XL) 300 MG extended release tablet TAKE 1 TABLET BY MOUTH EVERY MORNING 90 tablet 3    lamoTRIgine (LAMICTAL) 200 MG tablet TAKE 1 TABLET BY MOUTH DAILY 90 tablet 3    SUMAtriptan (IMITREX) 100 MG tablet Take 1 tablet by mouth as needed for Migraine 9 tablet 3    folic acid (FOLVITE) 485 MCG tablet Take 400 mcg by mouth daily      aspirin 325 MG tablet Take 325 mg by mouth daily      Multiple Vitamins-Minerals (MULTIVITAMIN ADULT PO) Take 1 tablet by mouth daily       LORazepam (ATIVAN) 0.5 MG tablet TAKE 1 TABLET BY MOUTH EVERY 8 HOURS AS NEEDED FOR ANXIETY (Patient taking differently: Take 0.5 mg by mouth 3 times daily. ) 90 tablet 0     No current facility-administered medications for this visit.      No Known Allergies    Health Maintenance   Topic Date Due    Hepatitis C screen  Never done    Pneumococcal 0-64 years Vaccine (1 of 2 - PPSV23) Never done    HIV screen  Never done    Shingles Vaccine (1 of 2) Never done   Floydene Ada DTaP/Tdap/Td vaccine (1 - Tdap) 06/18/2017    Potassium monitoring  08/21/2021    Creatinine monitoring  08/21/2021    Flu vaccine (1) 09/01/2021    Colon cancer screen colonoscopy  03/14/2024    Lipid screen  08/21/2025    COVID-19 Vaccine  Completed    Hepatitis A vaccine  Aged Out    Hepatitis B vaccine  Aged Out    Hib vaccine  Aged Out    Meningococcal (ACWY) vaccine  Aged Out       No results found for: LABA1C  Lab Results   Component Value Date    PSA 2.22 08/21/2020    PSA 1.83 07/15/2019    PSA 1.83 11/02/2017     TSH   Date Value Ref Range Status   08/21/2020 2.890 0.270 - 4.200 uIU/mL Final   ]  Lab Results   Component Value Date     08/21/2020    K 4.9 08/21/2020     08/21/2020    CO2 26 08/21/2020    BUN 15 08/21/2020    CREATININE 1.4 (H) 08/21/2020    GLUCOSE 108 08/21/2020    CALCIUM 10.0 08/21/2020    PROT 7.0 08/21/2020    LABALBU 4.7 08/21/2020    BILITOT <0.2 08/21/2020    ALKPHOS 71 08/21/2020    AST 29 08/21/2020    ALT 23 08/21/2020    LABGLOM 52 (A) 08/21/2020    GFRAA >59 08/21/2020    GLOB 2.4 01/05/2017     Lab Results   Component Value Date    CHOL 194 08/21/2020    CHOL 184 07/15/2019    CHOL 164 11/02/2017     Lab Results   Component Value Date    TRIG 47 08/21/2020    TRIG 92 07/15/2019    TRIG 54 11/02/2017     Lab Results   Component Value Date    HDL 98 08/21/2020    HDL 70 07/15/2019    HDL 62 11/02/2017     Lab Results   Component Value Date    LDLCALC 87 08/21/2020    LDLCALC 96 07/15/2019    LDLCALC 91 11/02/2017     Lab Results   Component Value Date     08/21/2020     06/01/2011    K 4.9 08/21/2020    K 4.1 06/01/2011     08/21/2020     06/01/2011    CO2 26 08/21/2020    BUN 15 08/21/2020    CREATININE 1.4 08/21/2020    CREATININE 1.0 06/01/2011    GLUCOSE 108 08/21/2020    CALCIUM 10.0 08/21/2020      Lab Results   Component Value Date    WBC 6.5 08/21/2020    HGB 15.0 08/21/2020    HCT 47.0 08/21/2020    MCV 92.3 08/21/2020  08/21/2020    LYMPHOPCT 10.3 (L) 09/29/2019    RBC 5.09 08/21/2020    MCH 29.5 08/21/2020    MCHC 31.9 (L) 08/21/2020    RDW 13.3 08/21/2020     Lab Results   Component Value Date    VITD25 40.4 01/07/2017       Subjective:      Review of Systems   Constitutional: Positive for fatigue. Negative for fever and unexpected weight change. HENT: Negative for ear discharge, ear pain, mouth sores, sore throat and trouble swallowing. Eyes: Negative for discharge, itching and visual disturbance. Respiratory: Positive for cough. Negative for choking, shortness of breath, wheezing and stridor. Cardiovascular: Negative for chest pain, palpitations and leg swelling. Gastrointestinal: Negative for abdominal distention, abdominal pain, blood in stool, constipation, diarrhea, nausea and vomiting. Endocrine: Negative for cold intolerance, polydipsia and polyuria. Genitourinary: Negative for difficulty urinating, dysuria, frequency and urgency. Musculoskeletal: Negative for arthralgias and gait problem. Skin: Negative for color change and rash. Allergic/Immunologic: Negative for food allergies and immunocompromised state. Neurological: Negative for dizziness, tremors, syncope, speech difficulty, weakness and headaches. Hematological: Negative for adenopathy. Does not bruise/bleed easily. Psychiatric/Behavioral: Negative for confusion and hallucinations. Objective:     Physical Exam  Constitutional:       General: He is not in acute distress. Appearance: He is well-developed. HENT:      Head: Normocephalic and atraumatic. Eyes:      General: No scleral icterus. Right eye: No discharge. Left eye: No discharge. Pupils: Pupils are equal, round, and reactive to light. Neck:      Thyroid: No thyromegaly. Vascular: No JVD. Cardiovascular:      Rate and Rhythm: Normal rate and regular rhythm. Heart sounds: Normal heart sounds. No murmur heard.      Pulmonary: Effort: Pulmonary effort is normal. No respiratory distress. Breath sounds: Normal breath sounds. No wheezing or rales. Abdominal:      General: Bowel sounds are normal. There is no distension. Palpations: Abdomen is soft. There is no mass. Tenderness: There is no abdominal tenderness. There is no guarding or rebound. Musculoskeletal:         General: No tenderness. Normal range of motion. Cervical back: Normal range of motion and neck supple. Skin:     General: Skin is warm and dry. Findings: No erythema or rash. Neurological:      Mental Status: He is alert and oriented to person, place, and time. Cranial Nerves: No cranial nerve deficit. Coordination: Coordination normal.      Deep Tendon Reflexes: Reflexes are normal and symmetric. Reflexes normal.   Psychiatric:         Mood and Affect: Mood is not depressed. Behavior: Behavior normal.         Thought Content: Thought content normal.         Judgment: Judgment normal.       /84   Pulse 84   Temp 99.1 °F (37.3 °C)   SpO2 98%           Assessment:      Problem List     Cough       covid screening           Loss of smell          Plan:        Patient given educational materials - see patient instructions. Discussed use, benefit, and side effects of prescribed medications. Allpatient questions answered. Pt voiced understanding. Reviewed health maintenance. Instructed to continue current medications, diet and exercise. Patient agreed with treatment plan. Follow up as directed. MEDICATIONS:  No orders of the defined types were placed in this encounter. ORDERS:  No orders of the defined types were placed in this encounter. Follow-up:  No follow-ups on file. PATIENT INSTRUCTIONS:  Patient Instructions   1.   Cough low-grade fever loss of taste and smell body aches  We will get a Covid screening today    Electronically signed by GUILLAUME Munguia on 8/5/2021 at 10:04 AM        EMRDragon/transcription disclaimer:  Much of this encounter note is electronic transcription/translation of spoken language to printed texts. The electronic translation of spoken language may be erroneous, or at times,nonsensical words or phrases may be inadvertently transcribed.   Although I have reviewed the note for such errors, some may still exist.

## 2021-08-06 ENCOUNTER — VIRTUAL VISIT (OUTPATIENT)
Dept: INTERNAL MEDICINE | Age: 56
End: 2021-08-06
Payer: COMMERCIAL

## 2021-08-06 ENCOUNTER — TELEPHONE (OUTPATIENT)
Dept: INTERNAL MEDICINE | Age: 56
End: 2021-08-06

## 2021-08-06 DIAGNOSIS — U07.1 COVID-19: ICD-10-CM

## 2021-08-06 PROCEDURE — 99213 OFFICE O/P EST LOW 20 MIN: CPT | Performed by: INTERNAL MEDICINE

## 2021-08-06 NOTE — TELEPHONE ENCOUNTER
I went over results and did facetime. He is feeling better and will let us know if worse. Should do better than would have since he had vaccine.

## 2021-08-06 NOTE — PROGRESS NOTES
ANXIETY  Patient taking differently: Take 0.5 mg by mouth 3 times daily. GUILLAUME Gonzalez       Social History     Tobacco Use    Smoking status: Current Every Day Smoker     Packs/day: 0.25     Years: 30.00     Pack years: 7.50     Types: Cigarettes    Smokeless tobacco: Never Used    Tobacco comment: 4 cigarettes per day   Vaping Use    Vaping Use: Never used   Substance Use Topics    Alcohol use: No    Drug use: No        Allergies, medications, pmh, fh, social history, vaccines, surgical history reviewed    PHYSICAL EXAMINATION:  [ INSTRUCTIONS:  \"[x]\" Indicates a positive item  \"[]\" Indicates a negative item  -- DELETE ALL ITEMS NOT EXAMINED]  Vital Signs: (As obtained by patient/caregiver or practitioner observation)    Blood pressure-  Heart rate-    Respiratory rate-    Temperature-  Pulse oximetry-     Constitutional: [x] Appears well-developed and well-nourished [] No apparent distress      [] Abnormal-   Mental status  [x] Alert and awake  [] Oriented to person/place/time []Able to follow commands      Eyes:  EOM    []  Normal  [] Abnormal-  Sclera  [x]  Normal  [] Abnormal -         Discharge []  None visible  [] Abnormal -    HENT:   [] Normocephalic, atraumatic.   [x] Abnormal sounds mildly congested[] Mouth/Throat: Mucous membranes are moist.     External Ears [] Normal  [] Abnormal-     Neck: [] No visualized mass     Pulmonary/Chest: [x] Respiratory effort normal.  [] No visualized signs of difficulty breathing or respiratory distress        [] Abnormal-      Musculoskeletal:   [] Normal gait with no signs of ataxia         [] Normal range of motion of neck        [] Abnormal-       Neurological:        [x] No Facial Asymmetry (Cranial nerve 7 motor function) (limited exam to video visit)          [] No gaze palsy        [] Abnormal-         Skin:        [x] No significant exanthematous lesions or discoloration noted on facial skin         [] Abnormal-            Psychiatric:       [x] Normal Affect [] No Hallucinations        [] Abnormal-     Other pertinent observable physical exam findings-     ASSESSMENT/PLAN:  1. COVID-19  Supportive care Tylenol for fever we could call in an inhaler or a Medrol pack if cough congestion worsen he will let us know      No follow-ups on file. Baldmear Zepeda, was evaluated through a synchronous (real-time) audio-video encounter. The patient (or guardian if applicable) is aware that this is a billable service. Verbal consent to proceed has been obtained within the past 12 months. The visit was conducted pursuant to the emergency declaration under the [address] [place] [address], [address] [place] [address] authority and the [employer] and [place] [employer] Act. Patient identification was verified, and a caregiver was present when appropriate. The patient was located in a state where the provider was credentialed to provide care. Total time spent on this encounter: 13'  --Xochilt Panchal MD on 8/15/2021 at 10:18 PM    An electronic signature was used to authenticate this note.

## 2021-08-07 ENCOUNTER — TELEPHONE (OUTPATIENT)
Dept: URGENT CARE | Age: 56
End: 2021-08-07

## 2021-08-07 NOTE — TELEPHONE ENCOUNTER
Patient called to request that a Urgent Care provider send him medication via phone call without being seen in the office. Patient was informed that he had not been seen in the office the providers would not send him any medications. The patient stated that he had tested positive for COVID.  Patient was advised to quarantine at home and to follow up with PCP, or in the case that sypmtoms had worsened to go to the ED

## 2021-08-09 ENCOUNTER — TELEPHONE (OUTPATIENT)
Dept: INTERNAL MEDICINE | Age: 56
End: 2021-08-09

## 2021-08-09 RX ORDER — ALBUTEROL SULFATE 90 UG/1
2 AEROSOL, METERED RESPIRATORY (INHALATION) EVERY 4 HOURS PRN
Qty: 1 INHALER | Refills: 0 | Status: SHIPPED | OUTPATIENT
Start: 2021-08-09 | End: 2021-09-07 | Stop reason: ALTCHOICE

## 2021-08-09 NOTE — TELEPHONE ENCOUNTER
He is developing some chest congestion now and she says that it is down deep and he can not cough up anything. He also hears a rattle with deep breath. He is asking about getting the inhaler that you mentioned. ...

## 2021-08-15 PROBLEM — U07.1 COVID-19: Status: ACTIVE | Noted: 2021-08-15

## 2021-08-24 RX ORDER — AMLODIPINE BESYLATE 5 MG/1
TABLET ORAL
Qty: 90 TABLET | Refills: 3 | Status: SHIPPED | OUTPATIENT
Start: 2021-08-24 | End: 2022-03-31 | Stop reason: SDUPTHER

## 2021-09-04 PROBLEM — R05.9 COUGH: Status: RESOLVED | Noted: 2021-08-05 | Resolved: 2021-09-04

## 2021-09-07 ENCOUNTER — OFFICE VISIT (OUTPATIENT)
Dept: INTERNAL MEDICINE | Age: 56
End: 2021-09-07
Payer: COMMERCIAL

## 2021-09-07 VITALS
HEIGHT: 76 IN | SYSTOLIC BLOOD PRESSURE: 130 MMHG | WEIGHT: 173.4 LBS | OXYGEN SATURATION: 98 % | DIASTOLIC BLOOD PRESSURE: 82 MMHG | BODY MASS INDEX: 21.11 KG/M2 | HEART RATE: 95 BPM

## 2021-09-07 DIAGNOSIS — Z00.00 PHYSICAL EXAM, ROUTINE: ICD-10-CM

## 2021-09-07 PROCEDURE — 99396 PREV VISIT EST AGE 40-64: CPT | Performed by: INTERNAL MEDICINE

## 2021-09-07 ASSESSMENT — ENCOUNTER SYMPTOMS
CONSTIPATION: 0
STRIDOR: 0
SORE THROAT: 0
SHORTNESS OF BREATH: 0
BACK PAIN: 0
DIARRHEA: 0
WHEEZING: 0
COUGH: 0
ABDOMINAL PAIN: 0
COLOR CHANGE: 0
BLOOD IN STOOL: 0
TROUBLE SWALLOWING: 0

## 2021-09-07 NOTE — PROGRESS NOTES
2021    Tania Ragsdale (:  1965) is a 60 y.o. male, here for a preventive medicine evaluation. Patient Active Problem List   Diagnosis    [address] disorder [address] [address]    Physical exam, routine    Constipation    Pelvic floor dysfunction    History of colon polyps    Essential hypertension    Gastroesophageal reflux disease without esophagitis    History of colonic diverticulitis    Diverticulosis of large intestine without hemorrhage    SBO (small bowel obstruction) (HCC)    Loss of smell    COVID-19       Review of Systems   Constitutional: Negative for activity change, appetite change, chills, diaphoresis, fatigue and fever. HENT: Negative for congestion, hearing loss, postnasal drip, sore throat, tinnitus and trouble swallowing. Eyes: Negative for visual disturbance. Respiratory: Negative for cough, shortness of breath, wheezing and stridor. Cardiovascular: Negative for chest pain, palpitations and leg swelling. Gastrointestinal: Negative for abdominal pain, blood in stool, constipation and diarrhea. Endocrine: Negative for cold intolerance. Genitourinary: Negative for frequency. Musculoskeletal: Negative for arthralgias, back pain and joint swelling. Skin: Negative for color change and wound. Allergic/Immunologic: Negative for environmental allergies. Neurological: Negative for dizziness, light-headedness and headaches. Hematological: Negative for adenopathy. Does not bruise/bleed easily. Psychiatric/Behavioral: Negative for decreased concentration, dysphoric mood and sleep disturbance. The patient is not nervous/anxious. Prior to Visit Medications    Medication Sig Taking?  Authorizing Provider   amLODIPine (NORVASC) 5 MG tablet TAKE 1 TABLET BY MOUTH EVERY NIGHT Yes Keya West MD   losartan-hydroCHLOROthiazide (HYZAAR) 100-25 MG per tablet TAKE 1 TABLET DAILY Yes Keya West MD   RABEprazole (ACIPHEX) 20 MG tablet TAKE 1 TABLET DAILY Yes Yamila Elizabeth Marcial MD   sildenafil (VIAGRA) 50 MG tablet Take 1 tablet by mouth as needed for Erectile Dysfunction (do not exceed 1 in 24h) Yes Georgian Kussmaul, MD   Prucalopride Succinate (MOTEGRITY) 2 MG TABS TAKE 1 TABLET BY MOUTH DAILY Yes Georgian Kussmaul, MD   buPROPion (WELLBUTRIN XL) 300 MG extended release tablet TAKE 1 TABLET BY MOUTH EVERY MORNING Yes Georgian Kussmaul, MD   lamoTRIgine (LAMICTAL) 200 MG tablet TAKE 1 TABLET BY MOUTH DAILY Yes Georgian Kussmaul, MD   SUMAtriptan (IMITREX) 100 MG tablet Take 1 tablet by mouth as needed for Migraine Yes Georgian Kussmaul, MD   folic acid (FOLVITE) 145 MCG tablet Take 400 mcg by mouth daily Yes Historical Provider, MD   aspirin 325 MG tablet Take 325 mg by mouth daily Yes Historical Provider, MD   Multiple Vitamins-Minerals (MULTIVITAMIN ADULT PO) Take 1 tablet by mouth daily  Yes Historical Provider, MD   LORazepam (ATIVAN) 0.5 MG tablet TAKE 1 TABLET BY MOUTH EVERY 8 HOURS AS NEEDED FOR ANXIETY  Patient taking differently: Take 0.5 mg by mouth 3 times daily.   Yes GUILLAUME Penny        No Known Allergies    Past Medical History:   Diagnosis Date    Arthritis     Bipolar disorder ([address]     BPH (benign prostatic hypertrophy)     Constipation     Depression     GERD (gastroesophageal reflux disease)     Headache(784.0)     History of blood transfusion     Hypertension     Pelvic floor dysfunction        Past Surgical History:   Procedure Laterality Date    ACHILLES TENDON SURGERY      CHOLECYSTECTOMY      COLONOSCOPY N/A 3/14/2019    Dr Virgil Menchaca yr recall    ENDOSCOPY, COLON, DIAGNOSTIC      JOINT REPLACEMENT      left ankle replacement    TONSILLECTOMY AND ADENOIDECTOMY         Social History     Socioeconomic History    Marital status:      Spouse name: Not on file    Number of children: Not on file    Years of education: Not on file    Highest education level: Not on file   Occupational History    Not on file   Tobacco Use    Smoking status: Current Every Day Smoker     Packs/day: 0.25     Years: 30.00     Pack years: 7.50     Types: Cigarettes    Smokeless tobacco: Never Used    Tobacco comment: 4 cigarettes per day   Vaping Use    Vaping Use: Never used   Substance and Sexual Activity    Alcohol use: No    Drug use: No    Sexual activity: Not on file   Other Topics Concern    Not on file   Social History Narrative    Not on file     Social Determinants of Health     Financial Resource Strain: Low Risk     Difficulty of Paying Living Expenses: Not hard at all   Food Insecurity: No Food Insecurity    Worried About Running Out of Food in the Last Year: Never true    Genesis of Food in the Last Year: Never true   Transportation Needs:     Lack of Transportation (Medical):      Lack of Transportation (Non-Medical):    Physical Activity:     Days of Exercise per Week:     Minutes of Exercise per Session:    Stress:     Feeling of Stress :    Social Connections:     Frequency of Communication with Friends and Family:     Frequency of Social Gatherings with Friends and Family:     Attends Taoist Services:     Active Member of Clubs or Organizations:     Attends Club or Organization Meetings:     Marital Status:    Intimate Partner Violence:     Fear of Current or Ex-Partner:     Emotionally Abused:     Physically Abused:     Sexually Abused:         Family History   Problem Relation Age of Onset    Colon Cancer Neg Hx     Colon Polyps Neg Hx     Liver Cancer Neg Hx     Liver Disease Neg Hx     Esophageal Cancer Neg Hx     Stomach Cancer Neg Hx     Rectal Cancer Neg Hx        ADVANCE DIRECTIVE: N, <no information>    Vitals:    09/07/21 0817   BP: 130/82   Site: Right Upper Arm   Position: Sitting   Cuff Size: Medium Adult   Pulse: 95   SpO2: 98%   Weight: 173 lb 6.4 oz (78.7 kg)   Height: 6' 4\" (1.93 m)     Estimated body mass index is 21.11 kg/m² as calculated from the following:    Height as of this encounter: 6' 4\" (1.93 m). Weight as of this encounter: 173 lb 6.4 oz (78.7 kg). Physical Exam  Vitals and nursing note reviewed. Constitutional:       General: He is not in acute distress. Appearance: He is well-developed. HENT:      Head: Normocephalic and atraumatic. Right Ear: External ear normal.      Left Ear: External ear normal.      Nose: Nose normal.   Eyes:      General: No scleral icterus. Conjunctiva/sclera: Conjunctivae normal.      Pupils: Pupils are equal, round, and reactive to light. Neck:      Thyroid: No thyromegaly. Cardiovascular:      Rate and Rhythm: Normal rate and regular rhythm. Heart sounds: Normal heart sounds. No murmur heard. Pulmonary:      Effort: Pulmonary effort is normal.      Breath sounds: Normal breath sounds. No wheezing or rales. Abdominal:      General: Bowel sounds are normal. There is no distension. Palpations: Abdomen is soft. There is no mass. Tenderness: There is no abdominal tenderness. There is no rebound. Musculoskeletal:         General: No tenderness. Normal range of motion. Cervical back: Normal range of motion and neck supple. Lymphadenopathy:      Cervical: No cervical adenopathy. Skin:     General: Skin is warm and dry. Findings: No rash. Neurological:      Mental Status: He is alert and oriented to person, place, and time. Cranial Nerves: No cranial nerve deficit. Deep Tendon Reflexes: Reflexes normal.   Psychiatric:         Behavior: Behavior normal.         Thought Content: Thought content normal.         Judgment: Judgment normal.         No flowsheet data found.     Lab Results   Component Value Date    CHOL 194 08/21/2020    CHOL 184 07/15/2019    CHOL 164 11/02/2017    TRIG 47 08/21/2020    TRIG 92 07/15/2019    TRIG 54 11/02/2017    HDL 98 08/21/2020    HDL 70 07/15/2019    HDL 62 11/02/2017    LDLCALC 87 08/21/2020    LDLCALC 96 07/15/2019    LDLCALC 91 11/02/2017    GLUCOSE 108 08/21/2020 The 10-year ASCVD risk score (Ashkan Middleton et al., 2013) is: 8.5%    Values used to calculate the score:      Age: 60 years      Sex: Male      Is Non- : No      Diabetic: No      Tobacco smoker: Yes      Systolic Blood Pressure: 534 mmHg      Is BP treated: Yes      HDL Cholesterol: 98 mg/dL      Total Cholesterol: 194 mg/dL    Immunization History   Administered Date(s) Administered    COVID-19, J&J, PF, 0.5 mL 03/16/2021    Td, unspecified formulation 06/17/2017       Health Maintenance   Topic Date Due    Hepatitis C screen  Never done    Pneumococcal 0-64 years Vaccine (1 of 2 - PPSV23) Never done    HIV screen  Never done    Shingles Vaccine (1 of 2) Never done    DTaP/Tdap/Td vaccine (1 - Tdap) 06/18/2017    Potassium monitoring  08/21/2021    Creatinine monitoring  08/21/2021    Flu vaccine (1) Never done    Colon cancer screen colonoscopy  03/14/2024    Lipid screen  08/21/2025    COVID-19 Vaccine  Completed    Hepatitis A vaccine  Aged Out    Hepatitis B vaccine  Aged Out    Hib vaccine  Aged Out    Meningococcal (ACWY) vaccine  Aged Out          ASSESSMENT/PLAN:  Problem List        Other    Physical exam, routine      At goal, continue current medications, continue current treatment plan, medication adherence emphasized and lifestyle modifications recommended             1. Physical exam, routine  Assessment & Plan:   At goal, continue current medications, continue current treatment plan, medication adherence emphasized and lifestyle modifications recommended      No follow-ups on file. An electronic signature was used to authenticate this note.     --Bartolo Napier MD on 9/7/2021 at 8:52 AM

## 2021-09-27 ENCOUNTER — OFFICE VISIT (OUTPATIENT)
Dept: INTERNAL MEDICINE | Age: 56
End: 2021-09-27
Payer: COMMERCIAL

## 2021-09-27 VITALS
OXYGEN SATURATION: 97 % | DIASTOLIC BLOOD PRESSURE: 90 MMHG | WEIGHT: 173 LBS | SYSTOLIC BLOOD PRESSURE: 132 MMHG | BODY MASS INDEX: 21.07 KG/M2 | HEIGHT: 76 IN | HEART RATE: 76 BPM

## 2021-09-27 DIAGNOSIS — R74.8 ELEVATED AMYLASE AND LIPASE: ICD-10-CM

## 2021-09-27 DIAGNOSIS — I10 ESSENTIAL HYPERTENSION: Primary | ICD-10-CM

## 2021-09-27 DIAGNOSIS — Z23 NEED FOR PROPHYLACTIC VACCINATION AGAINST STREPTOCOCCUS PNEUMONIAE (PNEUMOCOCCUS): ICD-10-CM

## 2021-09-27 DIAGNOSIS — Z23 NEED FOR PROPHYLACTIC VACCINATION AGAINST DIPHTHERIA-TETANUS-PERTUSSIS (DTP): ICD-10-CM

## 2021-09-27 DIAGNOSIS — Z11.4 SCREENING FOR HIV (HUMAN IMMUNODEFICIENCY VIRUS): ICD-10-CM

## 2021-09-27 DIAGNOSIS — E55.9 VITAMIN D DEFICIENCY: ICD-10-CM

## 2021-09-27 DIAGNOSIS — Z23 NEED FOR PROPHYLACTIC VACCINATION AND INOCULATION AGAINST VARICELLA: ICD-10-CM

## 2021-09-27 DIAGNOSIS — M62.89 PELVIC FLOOR DYSFUNCTION: Chronic | ICD-10-CM

## 2021-09-27 DIAGNOSIS — Z11.59 NEED FOR HEPATITIS C SCREENING TEST: ICD-10-CM

## 2021-09-27 PROCEDURE — 99214 OFFICE O/P EST MOD 30 MIN: CPT | Performed by: INTERNAL MEDICINE

## 2021-09-27 PROCEDURE — 90732 PPSV23 VACC 2 YRS+ SUBQ/IM: CPT | Performed by: INTERNAL MEDICINE

## 2021-09-27 PROCEDURE — 90471 IMMUNIZATION ADMIN: CPT | Performed by: INTERNAL MEDICINE

## 2021-09-27 NOTE — PROGRESS NOTES
Chief Complaint   Patient presents with    6 Month Follow-Up    Hypertension       HPI: Patient is here today for 6-month follow-up hypertension and other medical issues he is also recently had Covid he recovered well still a little off with his sense of taste and smell his blood pressure here today is elevated and has been a bit elevated at home.     Past Medical History:   Diagnosis Date    Arthritis     Bipolar disorder ([address]     BPH (benign prostatic hypertrophy)     Constipation     Depression     GERD (gastroesophageal reflux disease)     Headache(784.0)     History of blood transfusion     Hypertension     Pelvic floor dysfunction        Past Surgical History:   Procedure Laterality Date    ACHILLES TENDON SURGERY      CHOLECYSTECTOMY      COLONOSCOPY N/A 3/14/2019    Dr Earline Cordon yr recall    ENDOSCOPY, COLON, DIAGNOSTIC      JOINT REPLACEMENT      left ankle replacement    TONSILLECTOMY AND ADENOIDECTOMY         Family History   Problem Relation Age of Onset    Colon Cancer Neg Hx     Colon Polyps Neg Hx     Liver Cancer Neg Hx     Liver Disease Neg Hx     Esophageal Cancer Neg Hx     Stomach Cancer Neg Hx     Rectal Cancer Neg Hx        Social History     Socioeconomic History    Marital status:      Spouse name: Not on file    Number of children: Not on file    Years of education: Not on file    Highest education level: Not on file   Occupational History    Not on file   Tobacco Use    Smoking status: Current Every Day Smoker     Packs/day: 0.25     Years: 30.00     Pack years: 7.50     Types: Cigarettes    Smokeless tobacco: Never Used    Tobacco comment: 4 cigarettes per day   Vaping Use    Vaping Use: Never used   Substance and Sexual Activity    Alcohol use: No    Drug use: No    Sexual activity: Not on file   Other Topics Concern    Not on file   Social History Narrative    Not on file     Social Determinants of Health     Financial Resource Strain: Low Risk     Difficulty of Paying Living Expenses: Not hard at all   Food Insecurity: No Food Insecurity    Worried About Running Out of Food in the Last Year: Never true    [employer] of Food in the Last Year: Never true   Transportation Needs:     Lack of Transportation (Medical):      Lack of Transportation (Non-Medical):    Physical Activity:     Days of Exercise per Week:     Minutes of Exercise per Session:    Stress:     Feeling of Stress :    Social Connections:     Frequency of Communication with Friends and Family:     Frequency of Social Gatherings with Friends and Family:     Attends Jew Services:     Active Member of Clubs or Organizations:     Attends Club or Organization Meetings:     Marital Status:    Intimate Partner Violence:     Fear of Current or Ex-Partner:     Emotionally Abused:     Physically Abused:     Sexually Abused:        No Known Allergies    Current Outpatient Medications   Medication Sig Dispense Refill    amLODIPine (NORVASC) 5 MG tablet TAKE 1 TABLET BY MOUTH EVERY NIGHT 90 tablet 3    losartan-hydroCHLOROthiazide (HYZAAR) 100-25 MG per tablet TAKE 1 TABLET DAILY 90 tablet 3    RABEprazole (ACIPHEX) 20 MG tablet TAKE 1 TABLET DAILY 90 tablet 3    sildenafil (VIAGRA) 50 MG tablet Take 1 tablet by mouth as needed for Erectile Dysfunction (do not exceed 1 in 24h) 30 tablet 0    buPROPion (WELLBUTRIN XL) 300 MG extended release tablet TAKE 1 TABLET BY MOUTH EVERY MORNING 90 tablet 3    lamoTRIgine (LAMICTAL) 200 MG tablet TAKE 1 TABLET BY MOUTH DAILY 90 tablet 3    SUMAtriptan (IMITREX) 100 MG tablet Take 1 tablet by mouth as needed for Migraine 9 tablet 3    folic acid (FOLVITE) 995 MCG tablet Take 400 mcg by mouth daily      aspirin 325 MG tablet Take 325 mg by mouth daily      Multiple Vitamins-Minerals (MULTIVITAMIN ADULT PO) Take 1 tablet by mouth daily       LORazepam (ATIVAN) 0.5 MG tablet TAKE 1 TABLET BY MOUTH EVERY 8 HOURS AS NEEDED FOR ANXIETY (Patient taking differently: Take 0.5 mg by mouth 3 times daily. ) 90 tablet 0     No current facility-administered medications for this visit. Review of Systems    BP (!) 132/90 (Site: Right Upper Arm)   Pulse 76   Ht 6' 4\" (1.93 m)   Wt 173 lb (78.5 kg)   SpO2 97%   BMI 21.06 kg/m²   BP Readings from Last 7 Encounters:   09/27/21 (!) 132/90   09/07/21 130/82   08/05/21 138/84   03/25/21 130/80   12/17/20 134/88   08/21/20 (!) 150/90   02/20/20 (!) 140/90     Wt Readings from Last 7 Encounters:   09/27/21 173 lb (78.5 kg)   09/07/21 173 lb 6.4 oz (78.7 kg)   03/25/21 179 lb (81.2 kg)   12/17/20 182 lb (82.6 kg)   08/21/20 186 lb (84.4 kg)   02/20/20 176 lb (79.8 kg)   09/30/19 175 lb 1.6 oz (79.4 kg)     BMI Readings from Last 7 Encounters:   09/27/21 21.06 kg/m²   09/07/21 21.11 kg/m²   03/25/21 21.79 kg/m²   12/17/20 22.15 kg/m²   08/21/20 22.64 kg/m²   02/20/20 21.42 kg/m²   09/30/19 21.31 kg/m²     Resp Readings from Last 7 Encounters:   10/01/19 20   09/27/19 18   03/14/19 16   09/01/18 18   11/09/17 18   06/17/17 20       Physical Exam  Constitutional:       General: He is not in acute distress. Eyes:      General: No scleral icterus. Cardiovascular:      Heart sounds: Normal heart sounds. Pulmonary:      Breath sounds: Normal breath sounds. Musculoskeletal:      Cervical back: Neck supple. Lymphadenopathy:      Cervical: No cervical adenopathy. Skin:     Findings: No rash. Results for orders placed or performed in visit on 08/05/21   COVID-19   Result Value Ref Range    SARS-CoV-2, PCR DETECTED (A) Not Detected       ASSESSMENT/ PLAN:  1. Essential hypertension  Amlodipine added most recently he needs to watch salt intake increase exercise we may need to increase the amlodipine if blood pressure staying higher than 135/85 routinely will have to make a change in plan of care  - CBC; Future  - Comprehensive Metabolic Panel;  Future  - Hemoglobin A1C; Future  - TSH

## 2021-10-07 PROBLEM — Z00.00 PHYSICAL EXAM, ROUTINE: Status: RESOLVED | Noted: 2017-11-21 | Resolved: 2021-10-07

## 2022-01-26 ENCOUNTER — PATIENT MESSAGE (OUTPATIENT)
Dept: INTERNAL MEDICINE | Age: 57
End: 2022-01-26

## 2022-01-26 DIAGNOSIS — B00.1 FEVER BLISTER: Primary | ICD-10-CM

## 2022-01-26 RX ORDER — VALACYCLOVIR HYDROCHLORIDE 1 G/1
2000 TABLET, FILM COATED ORAL 2 TIMES DAILY
Qty: 30 TABLET | Refills: 0 | Status: SHIPPED | OUTPATIENT
Start: 2022-01-26

## 2022-01-26 NOTE — TELEPHONE ENCOUNTER
From: Nayla Townsend  To: Dr. Jay Green  Sent: 1/26/2022 1:57 PM CST  Subject: cold sore    Getting a cold sore again. I have taken Valacyclovir 1 gm in the past. Could you please call a script in to [address] [place] [address]?  Thank you

## 2022-03-03 ENCOUNTER — TELEPHONE (OUTPATIENT)
Dept: INTERNAL MEDICINE | Age: 57
End: 2022-03-03

## 2022-03-03 DIAGNOSIS — K57.92 ACUTE DIVERTICULITIS: Primary | ICD-10-CM

## 2022-03-03 RX ORDER — CIPROFLOXACIN 500 MG/1
500 TABLET, FILM COATED ORAL 2 TIMES DAILY
Qty: 20 TABLET | Refills: 0 | Status: SHIPPED | OUTPATIENT
Start: 2022-03-03 | End: 2022-04-11

## 2022-03-03 RX ORDER — METRONIDAZOLE 500 MG/1
500 TABLET ORAL 3 TIMES DAILY
Qty: 30 TABLET | Refills: 0 | Status: SHIPPED | OUTPATIENT
Start: 2022-03-03 | End: 2022-04-11

## 2022-03-03 NOTE — TELEPHONE ENCOUNTER
I sent medicine to [facility]. If worse needs seen and if a lot worse needs to go to the ER.

## 2022-03-31 ENCOUNTER — OFFICE VISIT (OUTPATIENT)
Dept: INTERNAL MEDICINE | Age: 57
End: 2022-03-31
Payer: COMMERCIAL

## 2022-03-31 VITALS
BODY MASS INDEX: 22.53 KG/M2 | DIASTOLIC BLOOD PRESSURE: 82 MMHG | OXYGEN SATURATION: 97 % | SYSTOLIC BLOOD PRESSURE: 132 MMHG | HEIGHT: 76 IN | WEIGHT: 185 LBS | HEART RATE: 86 BPM

## 2022-03-31 DIAGNOSIS — I10 ESSENTIAL HYPERTENSION: Primary | Chronic | ICD-10-CM

## 2022-03-31 DIAGNOSIS — F31.81 BIPOLAR 2 DISORDER (HCC): ICD-10-CM

## 2022-03-31 DIAGNOSIS — Z86.16 HISTORY OF COVID-19: ICD-10-CM

## 2022-03-31 DIAGNOSIS — M62.89 PELVIC FLOOR DYSFUNCTION: Chronic | ICD-10-CM

## 2022-03-31 PROCEDURE — 99214 OFFICE O/P EST MOD 30 MIN: CPT | Performed by: INTERNAL MEDICINE

## 2022-03-31 RX ORDER — AMLODIPINE BESYLATE 5 MG/1
7.5 TABLET ORAL DAILY
Qty: 135 TABLET | Refills: 3 | Status: SHIPPED | OUTPATIENT
Start: 2022-03-31 | End: 2022-06-16 | Stop reason: SDUPTHER

## 2022-03-31 SDOH — ECONOMIC STABILITY: FOOD INSECURITY: WITHIN THE PAST 12 MONTHS, THE FOOD YOU BOUGHT JUST DIDN'T LAST AND YOU DIDN'T HAVE MONEY TO GET MORE.: NEVER TRUE

## 2022-03-31 SDOH — ECONOMIC STABILITY: FOOD INSECURITY: WITHIN THE PAST 12 MONTHS, YOU WORRIED THAT YOUR FOOD WOULD RUN OUT BEFORE YOU GOT MONEY TO BUY MORE.: NEVER TRUE

## 2022-03-31 ASSESSMENT — PATIENT HEALTH QUESTIONNAIRE - PHQ9
6. FEELING BAD ABOUT YOURSELF - OR THAT YOU ARE A FAILURE OR HAVE LET YOURSELF OR YOUR FAMILY DOWN: 0
9. THOUGHTS THAT YOU WOULD BE BETTER OFF DEAD, OR OF HURTING YOURSELF: 0
3. TROUBLE FALLING OR STAYING ASLEEP: 1
SUM OF ALL RESPONSES TO PHQ QUESTIONS 1-9: 2
SUM OF ALL RESPONSES TO PHQ QUESTIONS 1-9: 2
7. TROUBLE CONCENTRATING ON THINGS, SUCH AS READING THE NEWSPAPER OR WATCHING TELEVISION: 0
10. IF YOU CHECKED OFF ANY PROBLEMS, HOW DIFFICULT HAVE THESE PROBLEMS MADE IT FOR YOU TO DO YOUR WORK, TAKE CARE OF THINGS AT HOME, OR GET ALONG WITH OTHER PEOPLE: 0
SUM OF ALL RESPONSES TO PHQ QUESTIONS 1-9: 2
2. FEELING DOWN, DEPRESSED OR HOPELESS: 0
5. POOR APPETITE OR OVEREATING: 0
SUM OF ALL RESPONSES TO PHQ9 QUESTIONS 1 & 2: 0
SUM OF ALL RESPONSES TO PHQ QUESTIONS 1-9: 2
4. FEELING TIRED OR HAVING LITTLE ENERGY: 1
1. LITTLE INTEREST OR PLEASURE IN DOING THINGS: 0
8. MOVING OR SPEAKING SO SLOWLY THAT OTHER PEOPLE COULD HAVE NOTICED. OR THE OPPOSITE, BEING SO FIGETY OR RESTLESS THAT YOU HAVE BEEN MOVING AROUND A LOT MORE THAN USUAL: 0

## 2022-03-31 ASSESSMENT — SOCIAL DETERMINANTS OF HEALTH (SDOH): HOW HARD IS IT FOR YOU TO PAY FOR THE VERY BASICS LIKE FOOD, HOUSING, MEDICAL CARE, AND HEATING?: NOT HARD AT ALL

## 2022-03-31 NOTE — PROGRESS NOTES
Chief Complaint   Patient presents with    6 Month Follow-Up    Hypertension       HPI: Patient is here today for follow-up of hypertension bipolar disorder other medical issues. He had COVID-19 in the recent past did okay with that he has had the J&J vaccine and minimal during the booster he is likely going to get another booster in the near future.   He feels okay he denies headache chest pain dyspnea abdominal pain mood is okay but has been a little bit lost to follow-up with psychiatry    Past Medical History:   Diagnosis Date    Arthritis     Bipolar disorder ([address]     BPH (benign prostatic hypertrophy)     Constipation     Depression     GERD (gastroesophageal reflux disease)     Headache(784.0)     History of blood transfusion     Hypertension     Pelvic floor dysfunction        Past Surgical History:   Procedure Laterality Date    ACHILLES TENDON SURGERY      CHOLECYSTECTOMY      COLONOSCOPY N/A 3/14/2019    Dr Rand Britton yr recall    ENDOSCOPY, COLON, DIAGNOSTIC      JOINT REPLACEMENT      left ankle replacement    TONSILLECTOMY AND ADENOIDECTOMY         Family History   Problem Relation Age of Onset    Colon Cancer Neg Hx     Colon Polyps Neg Hx     Liver Cancer Neg Hx     Liver Disease Neg Hx     Esophageal Cancer Neg Hx     Stomach Cancer Neg Hx     Rectal Cancer Neg Hx        Social History     Socioeconomic History    Marital status:      Spouse name: Not on file    Number of children: Not on file    Years of education: Not on file    Highest education level: Not on file   Occupational History    Not on file   Tobacco Use    Smoking status: Current Every Day Smoker     Packs/day: 0.25     Years: 30.00     Pack years: 7.50     Types: Cigarettes    Smokeless tobacco: Never Used    Tobacco comment: 4 cigarettes per day   Vaping Use    Vaping Use: Never used   Substance and Sexual Activity    Alcohol use: No    Drug use: No    Sexual activity: Not on file   Other Topics Concern    Not on file   Social History Narrative    Not on file     Social Determinants of Health     Financial Resource Strain: Low Risk     Difficulty of Paying Living Expenses: Not hard at all   Food Insecurity: No Food Insecurity    Worried About Running Out of Food in the Last Year: Never true    [address] in the Last Year: Never true   Transportation Needs:     Lack of Transportation (Medical): Not on file    Lack of Transportation (Non-Medical): Not on file   Physical Activity:     Days of Exercise per Week: Not on file    Minutes of Exercise per Session: Not on file   Stress:     Feeling of Stress : Not on file   Social Connections:     Frequency of Communication with Friends and Family: Not on file    Frequency of Social Gatherings with Friends and Family: Not on file    Attends Bahai Services: Not on file    Active Member of [address] [place] [address] or Organizations: Not on file    Attends Club or Organization Meetings: Not on file    Marital Status: Not on file   Intimate Partner Violence:     Fear of Current or Ex-Partner: Not on file    Emotionally Abused: Not on file    Physically Abused: Not on file    Sexually Abused: Not on file   Housing Stability:     Unable to Pay for Housing in the Last Year: Not on file    Number of [place] in the Last Year: Not on file    Unstable Housing in the Last Year: Not on file       Allergies   Allergen Reactions    Nut [Peanut-Containing Drug Products]        Current Outpatient Medications   Medication Sig Dispense Refill    amLODIPine (NORVASC) 5 MG tablet Take 1.5 tablets by mouth daily 135 tablet 3    sildenafil (VIAGRA) 50 MG tablet TAKE 1 TABLET BY MOUTH DAILY AS NEEDED.  DO NOT EXCEED ONE IN 24 HOURS 9 tablet 1    valACYclovir (VALTREX) 1 g tablet Take 2 tablets by mouth 2 times daily 30 tablet 0    losartan-hydroCHLOROthiazide (HYZAAR) 100-25 MG per tablet TAKE 1 TABLET DAILY 90 tablet 3    RABEprazole (ACIPHEX) 20 MG tablet TAKE 1 TABLET DAILY 90 tablet 3    buPROPion (WELLBUTRIN XL) 300 MG extended release tablet TAKE 1 TABLET BY MOUTH EVERY MORNING 90 tablet 3    lamoTRIgine (LAMICTAL) 200 MG tablet TAKE 1 TABLET BY MOUTH DAILY 90 tablet 3    SUMAtriptan (IMITREX) 100 MG tablet Take 1 tablet by mouth as needed for Migraine 9 tablet 3    folic acid (FOLVITE) 659 MCG tablet Take 400 mcg by mouth daily      aspirin 325 MG tablet Take 325 mg by mouth daily      Multiple Vitamins-Minerals (MULTIVITAMIN ADULT PO) Take 1 tablet by mouth daily       LORazepam (ATIVAN) 0.5 MG tablet TAKE 1 TABLET BY MOUTH EVERY 8 HOURS AS NEEDED FOR ANXIETY (Patient taking differently: Take 0.5 mg by mouth 3 times daily. ) 90 tablet 0     No current facility-administered medications for this visit. Review of Systems    /82   Pulse 86   Ht 6' 4\" (1.93 m)   Wt 185 lb (83.9 kg)   SpO2 97%   BMI 22.52 kg/m²   BP Readings from Last 7 Encounters:   03/31/22 132/82   09/27/21 (!) 132/90   09/07/21 130/82   08/05/21 138/84   03/25/21 130/80   12/17/20 134/88   08/21/20 (!) 150/90     Wt Readings from Last 7 Encounters:   03/31/22 185 lb (83.9 kg)   09/27/21 173 lb (78.5 kg)   09/07/21 173 lb 6.4 oz (78.7 kg)   03/25/21 179 lb (81.2 kg)   12/17/20 182 lb (82.6 kg)   08/21/20 186 lb (84.4 kg)   02/20/20 176 lb (79.8 kg)     BMI Readings from Last 7 Encounters:   03/31/22 22.52 kg/m²   09/27/21 21.06 kg/m²   09/07/21 21.11 kg/m²   03/25/21 21.79 kg/m²   12/17/20 22.15 kg/m²   08/21/20 22.64 kg/m²   02/20/20 21.42 kg/m²     Resp Readings from Last 7 Encounters:   10/01/19 20   09/27/19 18   03/14/19 16   09/01/18 18   11/09/17 18   06/17/17 20       Physical Exam  Constitutional:       General: He is not in acute distress. Eyes:      General: No scleral icterus. Cardiovascular:      Heart sounds: Normal heart sounds. Pulmonary:      Breath sounds: Normal breath sounds.    Musculoskeletal:      Cervical back: Neck supple. Lymphadenopathy:      Cervical: No cervical adenopathy. Skin:     Findings: No rash. Results for orders placed or performed in visit on 09/27/21   Lipase   Result Value Ref Range    Lipase 84 (H) 13 - 60 U/L   Amylase   Result Value Ref Range    Amylase 112 (H) 28 - 100 U/L   HIV Rapid 1&2   Result Value Ref Range    Rapid HIV 1&2 Non-reactive Non-reactive    HIV-1 P24 Ag Non-reactive Non-reactive   Hepatitis C Antibody   Result Value Ref Range    Hep C Ab Interp Non-Reactive    Vitamin D 25 Hydroxy   Result Value Ref Range    Vit D, 25-Hydroxy 74.9 >=30 ng/mL   Lipid Panel   Result Value Ref Range    Cholesterol, Total 168 160 - 199 mg/dL    Triglycerides 62 0 - 149 mg/dL    HDL 91 55 - 121 mg/dL    LDL Calculated 65 <100 mg/dL   TSH without Reflex   Result Value Ref Range    TSH 2.520 0.270 - 4.200 uIU/mL   Hemoglobin A1C   Result Value Ref Range    Hemoglobin A1C 5.3 4.0 - 6.0 %   Comprehensive Metabolic Panel   Result Value Ref Range    Sodium 141 136 - 145 mmol/L    Potassium 4.6 3.5 - 5.0 mmol/L    Chloride 99 98 - 111 mmol/L    CO2 28 22 - 29 mmol/L    Anion Gap 14 7 - 19 mmol/L    Glucose 94 74 - 109 mg/dL    BUN 14 6 - 20 mg/dL    CREATININE 1.0 0.5 - 1.2 mg/dL    GFR Non-African American >60 >60    GFR African American >59 >59    Calcium 9.8 8.6 - 10.0 mg/dL    Total Protein 6.4 (L) 6.6 - 8.7 g/dL    Albumin 4.5 3.5 - 5.2 g/dL    Total Bilirubin <0.2 0.2 - 1.2 mg/dL    Alkaline Phosphatase 80 40 - 130 U/L    ALT 20 5 - 41 U/L    AST 27 5 - 40 U/L   CBC   Result Value Ref Range    WBC 6.6 4.8 - 10.8 K/uL    RBC 4.47 (L) 4.70 - 6.10 M/uL    Hemoglobin 14.0 14.0 - 18.0 g/dL    Hematocrit 43.8 42.0 - 52.0 %    MCV 98.0 (H) 80.0 - 94.0 fL    MCH 31.3 (H) 27.0 - 31.0 pg    MCHC 32.0 (L) 33.0 - 37.0 g/dL    RDW 13.6 11.5 - 14.5 %    Platelets 517 904 - 664 K/uL    MPV 10.1 9.4 - 12.4 fL       ASSESSMENT/ PLAN:  1.  Essential hypertension  Increase amlodipine to 7.5 mg daily with some hypertension reviewing his home readings and readings here. He would do better with norvasc 7.5mg daily and if not staying under 135/85 we will increase to 10mg daily----I explained to the most common side effect of the lower extremity edema however I do not think that will be an issue  - amLODIPine (NORVASC) 5 MG tablet; Take 1.5 tablets by mouth daily  Dispense: 135 tablet; Refill: 3    2. Bipolar 2 disorder ([facility]  History of severe bipolar disorder required hospitalization in the past I do not want him to get lost to follow-up with psychiatry encouraged him to make an appointment to follow-up with Dr. Taye Chapman continue current meds for now he is doing well stay on his current medical regimen    3. Pelvic floor dysfunction  History of pelvic floor dysfunction was evaluated at tertiary care facilities and by GI and others this has not been as much of an issue    4. History of COVID-19  History of COVID-19 resolved we do encourage him to get his third booster at the appropriate time but she will do    Chart, medications, labs, vaccines reviewed. Keep up to date with routine care and follow up. Call with any problems or complaints. Keep up to date with routine screening recomendations and vaccines.

## 2022-04-04 RX ORDER — SILDENAFIL 50 MG/1
TABLET, FILM COATED ORAL
Qty: 9 TABLET | Refills: 1 | Status: SHIPPED | OUTPATIENT
Start: 2022-04-04

## 2022-04-04 RX ORDER — SILDENAFIL 50 MG/1
TABLET, FILM COATED ORAL
Qty: 30 TABLET | Refills: 0 | Status: SHIPPED | OUTPATIENT
Start: 2022-04-04 | End: 2022-04-04 | Stop reason: SDUPTHER

## 2022-04-09 DIAGNOSIS — K57.92 ACUTE DIVERTICULITIS: ICD-10-CM

## 2022-04-10 PROBLEM — Z86.16 HISTORY OF COVID-19: Status: ACTIVE | Noted: 2021-08-15

## 2022-04-11 RX ORDER — CIPROFLOXACIN 500 MG/1
TABLET, FILM COATED ORAL
Qty: 20 TABLET | Refills: 0 | Status: SHIPPED | OUTPATIENT
Start: 2022-04-11 | End: 2022-06-16 | Stop reason: ALTCHOICE

## 2022-04-11 RX ORDER — METRONIDAZOLE 500 MG/1
TABLET ORAL
Qty: 30 TABLET | Refills: 0 | Status: SHIPPED | OUTPATIENT
Start: 2022-04-11 | End: 2022-06-16 | Stop reason: ALTCHOICE

## 2022-05-10 PROBLEM — N52.8 OTHER MALE ERECTILE DYSFUNCTION: Status: ACTIVE | Noted: 2022-05-10

## 2022-05-31 RX ORDER — RABEPRAZOLE SODIUM 20 MG/1
TABLET, DELAYED RELEASE ORAL
Qty: 90 TABLET | Refills: 3 | Status: SHIPPED | OUTPATIENT
Start: 2022-05-31

## 2022-06-13 ENCOUNTER — PATIENT MESSAGE (OUTPATIENT)
Dept: INTERNAL MEDICINE | Age: 57
End: 2022-06-13

## 2022-06-13 DIAGNOSIS — I10 ESSENTIAL HYPERTENSION: Primary | ICD-10-CM

## 2022-06-13 RX ORDER — CLONIDINE HYDROCHLORIDE 0.1 MG/1
TABLET ORAL
Qty: 30 TABLET | Refills: 0 | Status: SHIPPED | OUTPATIENT
Start: 2022-06-13 | End: 2022-06-17

## 2022-06-13 RX ORDER — CLONIDINE HYDROCHLORIDE 0.1 MG/1
TABLET ORAL
Qty: 30 TABLET | Refills: 0 | Status: SHIPPED | OUTPATIENT
Start: 2022-06-13 | End: 2022-06-13 | Stop reason: SDUPTHER

## 2022-06-13 NOTE — TELEPHONE ENCOUNTER
From: Janelle Brand  To: Dr. Kirsten Frost: 6/13/2022 8:09 AM CDT  Subject: BP Results    I've taken my BP bd in AM and PM for the last few days. Missed this weekend being away from home. Doesn't look so good:  June 7: AM-135/89 PM-160/102  June 8: /92 PM (forgot)  June 9: AM-143/95 PM-156/98  June 12: PM-166/110  June 13: AM-143/91    Will continue until my Thursday morning appointment, unless you think I should come in sooner. I sometimes (not often) feel pulsations in neck and chest areas, but no headaches, blurred vision, or dizziness.   Romeo Greenfield

## 2022-06-13 NOTE — TELEPHONE ENCOUNTER
All and see how he is and increase amlodipine to 10mg daily - make sure taking all of his losartan/hctz also -- I also sent in a few clonidine to take if bp higher until increased med kicks in---

## 2022-06-16 ENCOUNTER — OFFICE VISIT (OUTPATIENT)
Dept: INTERNAL MEDICINE | Age: 57
End: 2022-06-16
Payer: COMMERCIAL

## 2022-06-16 VITALS
DIASTOLIC BLOOD PRESSURE: 82 MMHG | HEART RATE: 84 BPM | BODY MASS INDEX: 22.15 KG/M2 | OXYGEN SATURATION: 98 % | SYSTOLIC BLOOD PRESSURE: 140 MMHG | WEIGHT: 182 LBS

## 2022-06-16 DIAGNOSIS — I10 ESSENTIAL HYPERTENSION: Primary | Chronic | ICD-10-CM

## 2022-06-16 DIAGNOSIS — R74.8 ELEVATED AMYLASE AND LIPASE: ICD-10-CM

## 2022-06-16 DIAGNOSIS — K57.92 ACUTE DIVERTICULITIS: ICD-10-CM

## 2022-06-16 DIAGNOSIS — I10 ESSENTIAL HYPERTENSION: Chronic | ICD-10-CM

## 2022-06-16 LAB
ALBUMIN SERPL-MCNC: 4.4 G/DL (ref 3.5–5.2)
ALP BLD-CCNC: 88 U/L (ref 40–130)
ALT SERPL-CCNC: 16 U/L (ref 5–41)
AMYLASE: 99 U/L (ref 28–100)
ANION GAP SERPL CALCULATED.3IONS-SCNC: 10 MMOL/L (ref 7–19)
AST SERPL-CCNC: 25 U/L (ref 5–40)
BILIRUB SERPL-MCNC: <0.2 MG/DL (ref 0.2–1.2)
BILIRUBIN URINE: NEGATIVE
BLOOD, URINE: NEGATIVE
BUN BLDV-MCNC: 8 MG/DL (ref 6–20)
CALCIUM SERPL-MCNC: 9.7 MG/DL (ref 8.6–10)
CHLORIDE BLD-SCNC: 98 MMOL/L (ref 98–111)
CLARITY: CLEAR
CO2: 28 MMOL/L (ref 22–29)
COLOR: YELLOW
CREAT SERPL-MCNC: 1 MG/DL (ref 0.5–1.2)
GFR AFRICAN AMERICAN: >59
GFR NON-AFRICAN AMERICAN: >60
GLUCOSE BLD-MCNC: 101 MG/DL (ref 74–109)
GLUCOSE URINE: NEGATIVE MG/DL
HCT VFR BLD CALC: 42 % (ref 42–52)
HEMOGLOBIN: 14.1 G/DL (ref 14–18)
KETONES, URINE: NEGATIVE MG/DL
LEUKOCYTE ESTERASE, URINE: NEGATIVE
LIPASE: 67 U/L (ref 13–60)
MCH RBC QN AUTO: 32.5 PG (ref 27–31)
MCHC RBC AUTO-ENTMCNC: 33.6 G/DL (ref 33–37)
MCV RBC AUTO: 96.8 FL (ref 80–94)
NITRITE, URINE: NEGATIVE
PDW BLD-RTO: 13.2 % (ref 11.5–14.5)
PH UA: 6.5 (ref 5–8)
PLATELET # BLD: 237 K/UL (ref 130–400)
PMV BLD AUTO: 10.7 FL (ref 9.4–12.4)
POTASSIUM SERPL-SCNC: 4.8 MMOL/L (ref 3.5–5)
PROTEIN UA: NEGATIVE MG/DL
RBC # BLD: 4.34 M/UL (ref 4.7–6.1)
SODIUM BLD-SCNC: 136 MMOL/L (ref 136–145)
SPECIFIC GRAVITY UA: 1.01 (ref 1–1.03)
TOTAL PROTEIN: 6.7 G/DL (ref 6.6–8.7)
TSH SERPL DL<=0.05 MIU/L-ACNC: 3.27 UIU/ML (ref 0.27–4.2)
UROBILINOGEN, URINE: 0.2 E.U./DL
WBC # BLD: 7.7 K/UL (ref 4.8–10.8)

## 2022-06-16 PROCEDURE — 99214 OFFICE O/P EST MOD 30 MIN: CPT | Performed by: INTERNAL MEDICINE

## 2022-06-16 RX ORDER — AMLODIPINE BESYLATE 10 MG/1
10 TABLET ORAL NIGHTLY
Qty: 90 TABLET | Refills: 1 | Status: SHIPPED | OUTPATIENT
Start: 2022-06-16

## 2022-06-16 RX ORDER — CIPROFLOXACIN 500 MG/1
TABLET, FILM COATED ORAL
Qty: 20 TABLET | Refills: 0 | Status: SHIPPED | OUTPATIENT
Start: 2022-06-16

## 2022-06-16 RX ORDER — METRONIDAZOLE 500 MG/1
TABLET ORAL
Qty: 30 TABLET | Refills: 0 | Status: SHIPPED | OUTPATIENT
Start: 2022-06-16

## 2022-06-16 NOTE — PROGRESS NOTES
Chief Complaint   Patient presents with    Follow-up    Hypertension       HPI: Here today to follow-up hypertension recent issues with very elevated blood pressure through MyChart we have made some adjustments we have increased his amlodipine to 10 mg but it sounds like he is really just begun now. Blood pressure is better although still high.     Past Medical History:   Diagnosis Date    Arthritis     Bipolar disorder ([address]     BPH (benign prostatic hypertrophy)     Constipation     Depression     GERD (gastroesophageal reflux disease)     Headache(784.0)     History of blood transfusion     Hypertension     Pelvic floor dysfunction     Pelvic floor dysfunction        Past Surgical History:   Procedure Laterality Date    ACHILLES TENDON SURGERY      CHOLECYSTECTOMY      COLONOSCOPY N/A 3/14/2019    Dr Lupe Moyer yr recall    ENDOSCOPY, COLON, DIAGNOSTIC      JOINT REPLACEMENT      left ankle replacement    TONSILLECTOMY AND ADENOIDECTOMY         Family History   Problem Relation Age of Onset    Colon Cancer Neg Hx     Colon Polyps Neg Hx     Liver Cancer Neg Hx     Liver Disease Neg Hx     Esophageal Cancer Neg Hx     Stomach Cancer Neg Hx     Rectal Cancer Neg Hx        Social History     Socioeconomic History    Marital status:      Spouse name: Not on file    Number of children: Not on file    Years of education: Not on file    Highest education level: Not on file   Occupational History    Not on file   Tobacco Use    Smoking status: Current Every Day Smoker     Packs/day: 0.25     Years: 30.00     Pack years: 7.50     Types: Cigarettes    Smokeless tobacco: Never Used    Tobacco comment: 4 cigarettes per day   Vaping Use    Vaping Use: Never used   Substance and Sexual Activity    Alcohol use: No    Drug use: No    Sexual activity: Not on file   Other Topics Concern    Not on file   Social History Narrative    Not on file     Social Determinants of Health     Financial Resource Strain: Low Risk     Difficulty of Paying Living Expenses: Not hard at all   Food Insecurity: No Food Insecurity    Worried About Running Out of Food in the Last Year: Never true    Genesis of Food in the Last Year: Never true   Transportation Needs:     Lack of Transportation (Medical): Not on file    Lack of Transportation (Non-Medical):  Not on file   Physical Activity:     Days of Exercise per Week: Not on file    Minutes of Exercise per Session: Not on file   Stress:     Feeling of Stress : Not on file   Social Connections:     Frequency of Communication with Friends and Family: Not on file    Frequency of Social Gatherings with Friends and Family: Not on file    Attends [employer] Services: Not on file    Active Member of [address] [place] [address] or Organizations: Not on file    Attends Club or Organization Meetings: Not on file    Marital Status: Not on file   Intimate Partner Violence:     Fear of Current or Ex-Partner: Not on file    Emotionally Abused: Not on file    Physically Abused: Not on file    Sexually Abused: Not on file   Housing Stability:     Unable to Pay for Housing in the Last Year: Not on file    Number of [facility] in the Last Year: Not on file    Unstable Housing in the Last Year: Not on file       Allergies   Allergen Reactions    Chocolate Cough and Headaches    Nut [Peanut-Containing Drug Products]        Current Outpatient Medications   Medication Sig Dispense Refill    amLODIPine (NORVASC) 10 MG tablet Take 1 tablet by mouth at bedtime 90 tablet 1    ciprofloxacin (CIPRO) 500 MG tablet TAKE 1 TABLET BY MOUTH TWICE DAILY FOR 10 DAYS 20 tablet 0    metroNIDAZOLE (FLAGYL) 500 MG tablet TAKE 1 TABLET BY MOUTH THREE TIMES DAILY FOR 10 DAYS 30 tablet 0    amoxicillin-clavulanate (AUGMENTIN) 875-125 MG per tablet Take 1 tablet by mouth 2 times daily for 10 days 20 tablet 0    ofloxacin (OCUFLOX) 0.3 % solution Place 4 drops in ear(s) 3 03/25/21 179 lb (81.2 kg)     BMI Readings from Last 7 Encounters:   06/20/22 22.03 kg/m²   06/20/22 22.03 kg/m²   06/16/22 22.15 kg/m²   03/31/22 22.52 kg/m²   09/27/21 21.06 kg/m²   09/07/21 21.11 kg/m²   03/25/21 21.79 kg/m²     Resp Readings from Last 7 Encounters:   10/01/19 20   09/27/19 18   03/14/19 16   09/01/18 18   11/09/17 18   06/17/17 20       Physical Exam  Constitutional:       General: He is not in acute distress. Appearance: Normal appearance. He is well-developed. HENT:      Right Ear: External ear normal. Tympanic membrane is not injected. Left Ear: External ear normal. Tympanic membrane is not injected. Mouth/Throat:      Pharynx: No oropharyngeal exudate. Eyes:      General: No scleral icterus. Conjunctiva/sclera: Conjunctivae normal.   Neck:      Thyroid: No thyroid mass or thyromegaly. Vascular: No carotid bruit. Cardiovascular:      Rate and Rhythm: Normal rate and regular rhythm. Heart sounds: S1 normal and S2 normal. No murmur heard. No S3 or S4 sounds. Pulmonary:      Effort: Pulmonary effort is normal. No respiratory distress. Breath sounds: Normal breath sounds. No wheezing or rales. Chest:   Breasts:      Right: No supraclavicular adenopathy. Left: No supraclavicular adenopathy. Abdominal:      General: Bowel sounds are normal. There is no distension. Palpations: Abdomen is soft. There is no mass. Tenderness: There is no abdominal tenderness. Musculoskeletal:      Cervical back: Neck supple. Lymphadenopathy:      Cervical: No cervical adenopathy. Upper Body:      Right upper body: No supraclavicular adenopathy. Left upper body: No supraclavicular adenopathy. Skin:     Findings: No rash. Neurological:      Mental Status: He is alert and oriented to person, place, and time. Cranial Nerves: No cranial nerve deficit.          Results for orders placed or performed in visit on 09/27/21   Lipase Result Value Ref Range    Lipase 84 (H) 13 - 60 U/L   Amylase   Result Value Ref Range    Amylase 112 (H) 28 - 100 U/L   HIV Rapid 1&2   Result Value Ref Range    Rapid HIV 1&2 Non-reactive Non-reactive    HIV-1 P24 Ag Non-reactive Non-reactive   Hepatitis C Antibody   Result Value Ref Range    Hep C Ab Interp Non-Reactive    Vitamin D 25 Hydroxy   Result Value Ref Range    Vit D, 25-Hydroxy 74.9 >=30 ng/mL   Lipid Panel   Result Value Ref Range    Cholesterol, Total 168 160 - 199 mg/dL    Triglycerides 62 0 - 149 mg/dL    HDL 91 55 - 121 mg/dL    LDL Calculated 65 <100 mg/dL   TSH without Reflex   Result Value Ref Range    TSH 2.520 0.270 - 4.200 uIU/mL   Hemoglobin A1C   Result Value Ref Range    Hemoglobin A1C 5.3 4.0 - 6.0 %   Comprehensive Metabolic Panel   Result Value Ref Range    Sodium 141 136 - 145 mmol/L    Potassium 4.6 3.5 - 5.0 mmol/L    Chloride 99 98 - 111 mmol/L    CO2 28 22 - 29 mmol/L    Anion Gap 14 7 - 19 mmol/L    Glucose 94 74 - 109 mg/dL    BUN 14 6 - 20 mg/dL    CREATININE 1.0 0.5 - 1.2 mg/dL    GFR Non-African American >60 >60    GFR African American >59 >59    Calcium 9.8 8.6 - 10.0 mg/dL    Total Protein 6.4 (L) 6.6 - 8.7 g/dL    Albumin 4.5 3.5 - 5.2 g/dL    Total Bilirubin <0.2 0.2 - 1.2 mg/dL    Alkaline Phosphatase 80 40 - 130 U/L    ALT 20 5 - 41 U/L    AST 27 5 - 40 U/L   CBC   Result Value Ref Range    WBC 6.6 4.8 - 10.8 K/uL    RBC 4.47 (L) 4.70 - 6.10 M/uL    Hemoglobin 14.0 14.0 - 18.0 g/dL    Hematocrit 43.8 42.0 - 52.0 %    MCV 98.0 (H) 80.0 - 94.0 fL    MCH 31.3 (H) 27.0 - 31.0 pg    MCHC 32.0 (L) 33.0 - 37.0 g/dL    RDW 13.6 11.5 - 14.5 %    Platelets 625 773 - 151 K/uL    MPV 10.1 9.4 - 12.4 fL       ASSESSMENT/ PLAN:  1.  Essential hypertension  Amlodipine has been increased to 10 mg he really has not fully started that yet or just for a day or 2 stay on the amlodipine 10 mg and the losartan HCTZ 100-25 losartan HCTZ in the morning amlodipine at at bedtime or if that is difficult to maneuver take them both at the same time. - amLODIPine (NORVASC) 10 MG tablet; Take 1 tablet by mouth at bedtime  Dispense: 90 tablet; Refill: 1  - CBC; Future  - Comprehensive Metabolic Panel; Future  - Urinalysis with Microscopic; Future  - TSH; Future    2. Acute diverticulitis  And has had recurrent diverticulitis we will going to write Cipro Flagyl to keep on hand but of course we need to know if this occurs if recurrent episodes we may need to refer to a surgeon keep up-to-date with colonoscopy  - ciprofloxacin (CIPRO) 500 MG tablet; TAKE 1 TABLET BY MOUTH TWICE DAILY FOR 10 DAYS  Dispense: 20 tablet; Refill: 0  - metroNIDAZOLE (FLAGYL) 500 MG tablet; TAKE 1 TABLET BY MOUTH THREE TIMES DAILY FOR 10 DAYS  Dispense: 30 tablet; Refill: 0    3. Elevated amylase and lipase  Currently asymptomatic so not an issue but these were elevated in the past he would like them rechecked  - Amylase; Future  - Lipase;  Future    Watch blood pressure closely if staying higher than 135/85 routinely we need to know

## 2022-06-17 DIAGNOSIS — I10 ESSENTIAL HYPERTENSION: ICD-10-CM

## 2022-06-17 RX ORDER — CLONIDINE HYDROCHLORIDE 0.1 MG/1
TABLET ORAL
Qty: 30 TABLET | Refills: 0 | Status: SHIPPED | OUTPATIENT
Start: 2022-06-17

## 2022-06-20 ENCOUNTER — OFFICE VISIT (OUTPATIENT)
Dept: ENT CLINIC | Age: 57
End: 2022-06-20
Payer: COMMERCIAL

## 2022-06-20 ENCOUNTER — OFFICE VISIT (OUTPATIENT)
Dept: INTERNAL MEDICINE | Age: 57
End: 2022-06-20
Payer: COMMERCIAL

## 2022-06-20 VITALS
HEIGHT: 76 IN | WEIGHT: 181 LBS | SYSTOLIC BLOOD PRESSURE: 122 MMHG | BODY MASS INDEX: 22.04 KG/M2 | DIASTOLIC BLOOD PRESSURE: 60 MMHG

## 2022-06-20 VITALS
SYSTOLIC BLOOD PRESSURE: 132 MMHG | BODY MASS INDEX: 22.03 KG/M2 | HEART RATE: 69 BPM | OXYGEN SATURATION: 98 % | WEIGHT: 181 LBS | DIASTOLIC BLOOD PRESSURE: 79 MMHG | TEMPERATURE: 98.1 F

## 2022-06-20 DIAGNOSIS — H72.91 OTITIS MEDIA, SEROUS, TM RUPTURE, RIGHT: ICD-10-CM

## 2022-06-20 DIAGNOSIS — H65.01 NON-RECURRENT ACUTE SEROUS OTITIS MEDIA OF RIGHT EAR: Primary | ICD-10-CM

## 2022-06-20 DIAGNOSIS — H65.91 OTITIS MEDIA, SEROUS, TM RUPTURE, RIGHT: ICD-10-CM

## 2022-06-20 PROCEDURE — 99203 OFFICE O/P NEW LOW 30 MIN: CPT | Performed by: PHYSICIAN ASSISTANT

## 2022-06-20 PROCEDURE — 99212 OFFICE O/P EST SF 10 MIN: CPT | Performed by: NURSE PRACTITIONER

## 2022-06-20 RX ORDER — PSEUDOEPHEDRINE HYDROCHLORIDE 30 MG/1
30 TABLET ORAL 3 TIMES DAILY
Qty: 30 TABLET | Refills: 2 | Status: SHIPPED | OUTPATIENT
Start: 2022-06-20 | End: 2022-07-05

## 2022-06-20 RX ORDER — OFLOXACIN 3 MG/ML
4 SOLUTION/ DROPS OPHTHALMIC 3 TIMES DAILY
Qty: 10 ML | Refills: 0 | Status: SHIPPED | OUTPATIENT
Start: 2022-06-20 | End: 2022-06-30

## 2022-06-20 RX ORDER — AMOXICILLIN AND CLAVULANATE POTASSIUM 875; 125 MG/1; MG/1
1 TABLET, FILM COATED ORAL 2 TIMES DAILY
Qty: 20 TABLET | Refills: 0 | Status: SHIPPED | OUTPATIENT
Start: 2022-06-20 | End: 2022-06-30

## 2022-06-20 ASSESSMENT — ENCOUNTER SYMPTOMS
STRIDOR: 0
SORE THROAT: 0
SHORTNESS OF BREATH: 0
EYE ITCHING: 0
ABDOMINAL DISTENTION: 0
CONSTIPATION: 0
TROUBLE SWALLOWING: 0
NAUSEA: 0
FACIAL SWELLING: 0
SORE THROAT: 0
EYE PAIN: 0
COUGH: 0
VOICE CHANGE: 0
PHOTOPHOBIA: 0
CHOKING: 0
SINUS PAIN: 0
ABDOMINAL PAIN: 0
WHEEZING: 0
VOMITING: 0
BLOOD IN STOOL: 0
EYE DISCHARGE: 0
COLOR CHANGE: 0
DIARRHEA: 0
RHINORRHEA: 0
TROUBLE SWALLOWING: 0
SINUS PRESSURE: 0

## 2022-06-20 NOTE — PATIENT INSTRUCTIONS
1.  Right ear pain with bleeding  RTM;  Go to ENT now   2.  Left cerumen partial  As we are sending to ENT: they can suction

## 2022-06-20 NOTE — PROGRESS NOTES
[address] [facility]  [address] [address] [place] [address]  Dept: [phone]  Dept Fax: [phone]: [phone]    Jennifer Fuentes (:  1965) is a 59 y.o. male,Established patient  with green, here for evaluation of the following chief complaint(s): Otitis Media      Jennifer Fuentes is a 59 y.o. male who presents today for his medical conditions/complaints as noted below. Jennifer Fuentes is c/dilcia Otitis Media        HPI:     Chief Complaint   Patient presents with    Otitis Media     HPI   1.   Right otitis  Seen in UC end of the week; still with pain and bleeding from the right ear;   2.  bilatearl cerumen impaction ; partial on the left       Past Medical History:   Diagnosis Date    Arthritis     Bipolar disorder ([address]     BPH (benign prostatic hypertrophy)     Constipation     Depression     GERD (gastroesophageal reflux disease)     Headache(784.0)     History of blood transfusion     Hypertension     Pelvic floor dysfunction       Past Surgical History:   Procedure Laterality Date    ACHILLES TENDON SURGERY      CHOLECYSTECTOMY      COLONOSCOPY N/A 3/14/2019    Dr Tomasz Hallman yr recall    ENDOSCOPY, COLON, DIAGNOSTIC      JOINT REPLACEMENT      left ankle replacement    TONSILLECTOMY AND ADENOIDECTOMY         Vitals 2022 2022 2022 3/31/2022 2021    SYSTOLIC 006 483 [phone]   DIASTOLIC 79 82 86 82 90 96   Site Right Upper Arm - - - Right Upper Arm -   Position Sitting - - - - -   Cuff Size Medium Adult - - - - -   Pulse 69 - 84 86 - 76   Temp 98.1 - - - - -   Resp - - - - - -   SpO2 98 - 98 97 - 97   Weight 181 lb - 182 lb 185 lb - 173 lb   Height - - - 6' 4\" - 6' 4\"   Body mass index - - - 22.52 kg/m2 - 21.06 kg/m2   Pain Level - - - - - -   Some recent data might be hidden       Family History   Problem Relation Age of Onset    Colon Cancer Neg Hx     Colon Polyps Neg Hx  Liver Cancer Neg Hx     Liver Disease Neg Hx     Esophageal Cancer Neg Hx     Stomach Cancer Neg Hx     Rectal Cancer Neg Hx        Social History     Tobacco Use    Smoking status: Current Every Day Smoker     Packs/day: 0.25     Years: 30.00     Pack years: 7.50     Types: Cigarettes    Smokeless tobacco: Never Used    Tobacco comment: 4 cigarettes per day   Substance Use Topics    Alcohol use: No      Current Outpatient Medications   Medication Sig Dispense Refill    cloNIDine (CATAPRES) 0.1 MG tablet TAKE 1 TABLET BY MOUTH EVERY 6 HOURS AS NEEDED FOR SYSTOLIC BLOOD PRESSURE GREATER THAN 685 AND OR DIASTOLIC BLOOD PRESSURE GREATER THAN 95 30 tablet 0    amLODIPine (NORVASC) 10 MG tablet Take 1 tablet by mouth at bedtime 90 tablet 1    ciprofloxacin (CIPRO) 500 MG tablet TAKE 1 TABLET BY MOUTH TWICE DAILY FOR 10 DAYS 20 tablet 0    metroNIDAZOLE (FLAGYL) 500 MG tablet TAKE 1 TABLET BY MOUTH THREE TIMES DAILY FOR 10 DAYS 30 tablet 0    RABEprazole (ACIPHEX) 20 MG tablet TAKE 1 TABLET DAILY 90 tablet 3    sildenafil (VIAGRA) 50 MG tablet TAKE 1 TABLET BY MOUTH DAILY AS NEEDED.  DO NOT EXCEED ONE IN 24 HOURS 9 tablet 1    valACYclovir (VALTREX) 1 g tablet Take 2 tablets by mouth 2 times daily 30 tablet 0    losartan-hydroCHLOROthiazide (HYZAAR) 100-25 MG per tablet TAKE 1 TABLET DAILY 90 tablet 3    buPROPion (WELLBUTRIN XL) 300 MG extended release tablet TAKE 1 TABLET BY MOUTH EVERY MORNING 90 tablet 3    lamoTRIgine (LAMICTAL) 200 MG tablet TAKE 1 TABLET BY MOUTH DAILY 90 tablet 3    SUMAtriptan (IMITREX) 100 MG tablet Take 1 tablet by mouth as needed for Migraine 9 tablet 3    folic acid (FOLVITE) 685 MCG tablet Take 400 mcg by mouth daily      aspirin 325 MG tablet Take 325 mg by mouth daily      Multiple Vitamins-Minerals (MULTIVITAMIN ADULT PO) Take 1 tablet by mouth daily       LORazepam (ATIVAN) 0.5 MG tablet TAKE 1 TABLET BY MOUTH EVERY 8 HOURS AS NEEDED FOR ANXIETY (Patient taking differently: Take 0.5 mg by mouth 3 times daily. ) 90 tablet 0     No current facility-administered medications for this visit.      Allergies   Allergen Reactions    Nut [Peanut-Containing Drug Products]        Health Maintenance   Topic Date Due    Shingles vaccine (1 of 2) Never done    DTaP/Tdap/Td vaccine (1 - Tdap) 06/18/2017    Prostate Specific Antigen (PSA) Screening or Monitoring  08/21/2021    Flu vaccine (Season Ended) 09/01/2022    Pneumococcal 0-64 years Vaccine (2 - PCV) 09/27/2022    Depression Monitoring  03/31/2023    Colorectal Cancer Screen  03/14/2024    Lipids  09/27/2026    COVID-19 Vaccine  Completed    Hepatitis C screen  Completed    HIV screen  Completed    Hepatitis A vaccine  Aged Out    Hepatitis B vaccine  Aged Out    Hib vaccine  Aged Out    Meningococcal (ACWY) vaccine  Aged Out       Lab Results   Component Value Date    LABA1C 5.3 09/27/2021     Lab Results   Component Value Date    PSA 2.22 08/21/2020    PSA 1.83 07/15/2019    PSA 1.83 11/02/2017     TSH   Date Value Ref Range Status   06/16/2022 3.270 0.270 - 4.200 uIU/mL Final   ]  Lab Results   Component Value Date     06/16/2022    K 4.8 06/16/2022    CL 98 06/16/2022    CO2 28 06/16/2022    BUN 8 06/16/2022    CREATININE 1.0 06/16/2022    GLUCOSE 101 06/16/2022    CALCIUM 9.7 06/16/2022    PROT 6.7 06/16/2022    LABALBU 4.4 06/16/2022    BILITOT <0.2 06/16/2022    ALKPHOS 88 06/16/2022    AST 25 06/16/2022    ALT 16 06/16/2022    LABGLOM >60 06/16/2022    GFRAA >59 06/16/2022    GLOB 2.4 01/05/2017     Lab Results   Component Value Date    CHOL 168 09/27/2021    CHOL 194 08/21/2020    CHOL 184 07/15/2019     Lab Results   Component Value Date    TRIG 62 09/27/2021    TRIG 47 08/21/2020    TRIG 92 07/15/2019     Lab Results   Component Value Date    HDL 91 09/27/2021    HDL 98 08/21/2020    HDL 70 07/15/2019     Lab Results   Component Value Date    LDLCALC 65 09/27/2021    LDLCALC 87 08/21/2020    [facility] 96 07/15/2019     Lab Results   Component Value Date     06/16/2022     06/01/2011    K 4.8 06/16/2022    K 4.1 06/01/2011    CL 98 06/16/2022     06/01/2011    CO2 28 06/16/2022    BUN 8 06/16/2022    CREATININE 1.0 06/16/2022    CREATININE 1.0 06/01/2011    GLUCOSE 101 06/16/2022    CALCIUM 9.7 06/16/2022      Lab Results   Component Value Date    WBC 7.7 06/16/2022    HGB 14.1 06/16/2022    HCT 42.0 06/16/2022    MCV 96.8 (H) 06/16/2022     06/16/2022    LYMPHOPCT 10.3 (L) 09/29/2019    RBC 4.34 (L) 06/16/2022    MCH 32.5 (H) 06/16/2022    MCHC 33.6 06/16/2022    RDW 13.2 06/16/2022     Lab Results   Component Value Date    VITD25 74.9 09/27/2021       Subjective:      Review of Systems   Constitutional: Negative for fatigue, fever and unexpected weight change. HENT: Positive for ear pain. Negative for ear discharge, mouth sores, sore throat and trouble swallowing. Eyes: Negative for discharge, itching and visual disturbance. Respiratory: Negative for cough, choking, shortness of breath, wheezing and stridor. Cardiovascular: Negative for chest pain, palpitations and leg swelling. Gastrointestinal: Negative for abdominal distention, abdominal pain, blood in stool, constipation, diarrhea, nausea and vomiting. Endocrine: Negative for cold intolerance, polydipsia and polyuria. Genitourinary: Negative for difficulty urinating, dysuria, frequency and urgency. Musculoskeletal: Negative for arthralgias and gait problem. Skin: Negative for color change and rash. Allergic/Immunologic: Negative for food allergies and immunocompromised state. Neurological: Negative for dizziness, tremors, syncope, speech difficulty, weakness and headaches. Hematological: Negative for adenopathy. Does not bruise/bleed easily. Psychiatric/Behavioral: Negative for confusion and hallucinations. Objective:     Physical Exam  Constitutional:       General: He is not in acute distress. Appearance: He is well-developed. HENT:      Head: Normocephalic and atraumatic. Left Ear: Tympanic membrane normal. There is impacted cerumen. Ears:      Comments: Blood in canal still with bright red blood   Eyes:      General: No scleral icterus. Right eye: No discharge. Left eye: No discharge. Pupils: Pupils are equal, round, and reactive to light. Neck:      Thyroid: No thyromegaly. Vascular: No JVD. Cardiovascular:      Rate and Rhythm: Normal rate and regular rhythm. Heart sounds: Normal heart sounds. No murmur heard. Pulmonary:      Effort: Pulmonary effort is normal. No respiratory distress. Breath sounds: Normal breath sounds. No wheezing or rales. Abdominal:      General: Bowel sounds are normal. There is no distension. Palpations: Abdomen is soft. There is no mass. Tenderness: There is no abdominal tenderness. There is no guarding or rebound. Musculoskeletal:         General: No tenderness. Normal range of motion. Cervical back: Normal range of motion and neck supple. Skin:     General: Skin is warm and dry. Findings: No erythema or rash. Neurological:      Mental Status: He is alert and oriented to person, place, and time. Cranial Nerves: No cranial nerve deficit. Coordination: Coordination normal.      Deep Tendon Reflexes: Reflexes are normal and symmetric. Reflexes normal.   Psychiatric:         Mood and Affect: Mood is not depressed. Behavior: Behavior normal.         Thought Content: Thought content normal.         Judgment: Judgment normal.       /79 (Site: Right Upper Arm, Position: Sitting, Cuff Size: Medium Adult)   Pulse 69   Temp 98.1 °F (36.7 °C)   Wt 181 lb (82.1 kg)   SpO2 98%   BMI 22.03 kg/m²           Assessment:      Problem List     Otitis media, serous, tm rupture, right     Refer to ENT ;   They will see him now          Relevant Medications    valACYclovir (VALTREX) 1 g tablet    ciprofloxacin (CIPRO) 500 MG tablet    metroNIDAZOLE (FLAGYL) 500 MG tablet          Plan:        Patient given educational materials - see patient instructions. Discussed use, benefit, and side effects of prescribed medications. Allpatient questions answered. Pt voiced understanding. Reviewed health maintenance. Instructed to continue current medications, diet and exercise. Patient agreed with treatment plan. Follow up as directed. MEDICATIONS:  No orders of the defined types were placed in this encounter. ORDERS:  No orders of the defined types were placed in this encounter. Follow-up:  No follow-ups on file. PATIENT INSTRUCTIONS:  Patient Instructions   1. Right ear pain with bleeding  RTM;  Go to ENT now   2. Left cerumen partial  As we are sending to ENT: they can suction      Electronically signed by GUILLAUME Fallon on 6/20/2022 at 10:03 AM        EMRDragon/transcription disclaimer:  Much of this encounter note is electronic transcription/translation of spoken language to printed texts. The electronic translation of spoken language may be erroneous, or at times,nonsensical words or phrases may be inadvertently transcribed.   Although I have reviewed the note for such errors, some may still exist.

## 2022-06-20 NOTE — PROGRESS NOTES
[facility] OTOLARYNGOLOGY/ENT  Ely Thakur is a pleasant 57-year-old  male that was referred by Sheldon Stauffer due to problems with right otitis media with possible TM rupture. Ely Thakur reports that over the last week he has had a progressive right earache. He reports he has noticed some sporadic drainage from the right ear. He is getting ready to fly to [place] and is concerned that this may worsen his condition. He has requested evaluation today. Patient denies any prior ear surgeries in the past and overall has been fairly healthy. Allergies: Chocolate and Nut [peanut-containing drug products]      Current Outpatient Medications   Medication Sig Dispense Refill    amoxicillin-clavulanate (AUGMENTIN) 875-125 MG per tablet Take 1 tablet by mouth 2 times daily for 10 days 20 tablet 0    ofloxacin (OCUFLOX) 0.3 % solution Place 4 drops in ear(s) 3 times daily for 10 days 10 mL 0    pseudoephedrine (DECONGESTANT) 30 MG tablet Take 1 tablet by mouth 3 times daily prn ear pain 30 tablet 2    cloNIDine (CATAPRES) 0.1 MG tablet TAKE 1 TABLET BY MOUTH EVERY 6 HOURS AS NEEDED FOR SYSTOLIC BLOOD PRESSURE GREATER THAN 166 AND OR DIASTOLIC BLOOD PRESSURE GREATER THAN 95 30 tablet 0    amLODIPine (NORVASC) 10 MG tablet Take 1 tablet by mouth at bedtime 90 tablet 1    ciprofloxacin (CIPRO) 500 MG tablet TAKE 1 TABLET BY MOUTH TWICE DAILY FOR 10 DAYS 20 tablet 0    metroNIDAZOLE (FLAGYL) 500 MG tablet TAKE 1 TABLET BY MOUTH THREE TIMES DAILY FOR 10 DAYS 30 tablet 0    RABEprazole (ACIPHEX) 20 MG tablet TAKE 1 TABLET DAILY 90 tablet 3    sildenafil (VIAGRA) 50 MG tablet TAKE 1 TABLET BY MOUTH DAILY AS NEEDED.  DO NOT EXCEED ONE IN 24 HOURS 9 tablet 1    valACYclovir (VALTREX) 1 g tablet Take 2 tablets by mouth 2 times daily 30 tablet 0    losartan-hydroCHLOROthiazide (HYZAAR) 100-25 MG per tablet TAKE 1 TABLET DAILY 90 tablet 3    buPROPion (WELLBUTRIN XL) 300 MG extended release tablet TAKE 1 TABLET BY MOUTH EVERY MORNING 90 tablet 3    lamoTRIgine (LAMICTAL) 200 MG tablet TAKE 1 TABLET BY MOUTH DAILY 90 tablet 3    SUMAtriptan (IMITREX) 100 MG tablet Take 1 tablet by mouth as needed for Migraine 9 tablet 3    folic acid (FOLVITE) 846 MCG tablet Take 400 mcg by mouth daily      aspirin 325 MG tablet Take 325 mg by mouth daily      Multiple Vitamins-Minerals (MULTIVITAMIN ADULT PO) Take 1 tablet by mouth daily       LORazepam (ATIVAN) 0.5 MG tablet TAKE 1 TABLET BY MOUTH EVERY 8 HOURS AS NEEDED FOR ANXIETY (Patient taking differently: Take 0.5 mg by mouth 3 times daily. ) 90 tablet 0     No current facility-administered medications for this visit.        Past Surgical History:   Procedure Laterality Date    ACHILLES TENDON SURGERY      CHOLECYSTECTOMY      COLONOSCOPY N/A 3/14/2019    Dr Edvin Mckeon yr recall    ENDOSCOPY, COLON, DIAGNOSTIC      JOINT REPLACEMENT      left ankle replacement    TONSILLECTOMY AND ADENOIDECTOMY         Past Medical History:   Diagnosis Date    Arthritis     Bipolar disorder ([address]     BPH (benign prostatic hypertrophy)     Constipation     Depression     GERD (gastroesophageal reflux disease)     Headache(784.0)     History of blood transfusion     Hypertension     Pelvic floor dysfunction     Pelvic floor dysfunction        Family History   Problem Relation Age of Onset    Colon Cancer Neg Hx     Colon Polyps Neg Hx     Liver Cancer Neg Hx     Liver Disease Neg Hx     Esophageal Cancer Neg Hx     Stomach Cancer Neg Hx     Rectal Cancer Neg Hx        Social History     Tobacco Use    Smoking status: Current Every Day Smoker     Packs/day: 0.25     Years: 30.00     Pack years: 7.50     Types: Cigarettes    Smokeless tobacco: Never Used    Tobacco comment: 4 cigarettes per day   Substance Use Topics    Alcohol use: No           REVIEW OF SYSTEMS:  all other systems reviewed and are negative  Review of Systems   Constitutional: Negative for chills and fever. HENT: Negative for congestion, dental problem, ear discharge, ear pain, facial swelling, hearing loss, postnasal drip, rhinorrhea, sinus pressure, sinus pain, sore throat, tinnitus, trouble swallowing and voice change. Eyes: Negative for photophobia and pain. Neurological: Negative for dizziness and headaches. Comments:     PHYSICAL EXAM:    /60   Ht 6' 4\" (1.93 m)   Wt 181 lb (82.1 kg)   BMI 22.03 kg/m²   Body mass index is 22.03 kg/m². General Appearance: well developed  and well nourished  Head/ Face: normocephalic and atraumatic  Vocal Quality: good/ normal  Ears: Right Ear: External: external ears normal Otoscopy Ear Canal: purulent discharge Otoscopy TM: TM's dull, TM's buldging and TM's erythematous Left Ear: External: external ears normal Otoscopy Ear Canal: canal clear Otoscopy TM: TM's normal and TM's mobile  Hearing: grossly intact  Nose: nares normal and septum midline  Neck: supple and adenopathy none palpable  Thyroid: normal and nodules No    Assessment & Plan:    Problem List Items Addressed This Visit     Non-recurrent acute serous otitis media of right ear - Primary     Right-sided otitis media with no evidence of TM rupture  Plan: I will place the patient on Augmentin for 10 days as well as ofloxacin eardrops. Patient is to follow-up in 2 weeks for reevaluation. Patient was also prescribed Sudafed to take prior to his flight to alleviate the inner ear pressure. Patient was reminded to call if he has any questions or problems. Relevant Medications    amoxicillin-clavulanate (AUGMENTIN) 875-125 MG per tablet          No orders of the defined types were placed in this encounter.       Orders Placed This Encounter   Medications    amoxicillin-clavulanate (AUGMENTIN) 875-125 MG per tablet     Sig: Take 1 tablet by mouth 2 times daily for 10 days     Dispense:  20 tablet     Refill:  0    ofloxacin (OCUFLOX) 0.3 % solution     Sig: Place 4 drops in ear(s) 3 times daily for 10 days     Dispense:  10 mL     Refill:  0    pseudoephedrine (DECONGESTANT) 30 MG tablet     Sig: Take 1 tablet by mouth 3 times daily prn ear pain     Dispense:  30 tablet     Refill:  2       Electronically signed by Joann Rivera PA-C on 6/20/22 at 7:07 PM CDT          Please note that this chart was generated using dragon dictation software. Although every effort was made to ensure the accuracy of this automated transcription, some errors in transcription may have occurred.

## 2022-06-21 NOTE — ASSESSMENT & PLAN NOTE
Right-sided otitis media with no evidence of TM rupture  Plan: I will place the patient on Augmentin for 10 days as well as ofloxacin eardrops. Patient is to follow-up in 2 weeks for reevaluation. Patient was also prescribed Sudafed to take prior to his flight to alleviate the inner ear pressure. Patient was reminded to call if he has any questions or problems.

## 2022-07-05 ENCOUNTER — OFFICE VISIT (OUTPATIENT)
Dept: ENT CLINIC | Age: 57
End: 2022-07-05
Payer: COMMERCIAL

## 2022-07-05 VITALS
WEIGHT: 181 LBS | SYSTOLIC BLOOD PRESSURE: 122 MMHG | BODY MASS INDEX: 22.04 KG/M2 | HEIGHT: 76 IN | DIASTOLIC BLOOD PRESSURE: 78 MMHG

## 2022-07-05 DIAGNOSIS — H69.81 EUSTACHIAN TUBE DYSFUNCTION, RIGHT: Primary | ICD-10-CM

## 2022-07-05 PROCEDURE — 99213 OFFICE O/P EST LOW 20 MIN: CPT | Performed by: PHYSICIAN ASSISTANT

## 2022-07-05 RX ORDER — PSEUDOEPHEDRINE HYDROCHLORIDE 30 MG/1
30 TABLET ORAL 3 TIMES DAILY
Qty: 30 TABLET | Refills: 2 | Status: SHIPPED | OUTPATIENT
Start: 2022-07-05

## 2022-07-05 RX ORDER — FLUTICASONE PROPIONATE 50 MCG
2 SPRAY, SUSPENSION (ML) NASAL 2 TIMES DAILY
Qty: 16 G | Refills: 3 | Status: SHIPPED | OUTPATIENT
Start: 2022-07-05

## 2022-07-05 RX ORDER — METHYLPREDNISOLONE 4 MG/1
TABLET ORAL
Qty: 1 KIT | Refills: 0 | Status: SHIPPED | OUTPATIENT
Start: 2022-07-05 | End: 2022-07-11

## 2022-07-05 NOTE — PROGRESS NOTES
Mj Bell is a pleasant 49-year-old  male that presents for a 2-week follow-up after treatment for right-sided otitis media. Patient reports that the ears feel much improved after antibiotic therapy. He now complains of muffled hearing sporadically to the right ear with popping sounds. He also admits to sporadic dizziness that is noted when he gets up to walk. Physcial examination with the microscope revealed a resolved otitis media to the right ear with patient noted with a middle ear effusion with air-fluid levels noted to be present. Pneumonic exam was poor as anticipated. Examination of the left ear demonstrated a normal-appearing canal with the TM demonstrating some air-fluid levels to be present. Pneumonic exam was noted to be reduced. Impression: Resolved right-sided otitis media, eustachian tube dysfunction bilaterally    Plan: I will place the patient on a Medrol Dosepak as well as continue the Sudafed and Flonase. He is to follow-up in 2 weeks for reevaluation.       Electronically signed by Ina Sandy PA-C on 7/5/22 at 10:40 AM CDT

## 2022-07-19 ENCOUNTER — OFFICE VISIT (OUTPATIENT)
Dept: ENT CLINIC | Age: 57
End: 2022-07-19
Payer: COMMERCIAL

## 2022-07-19 VITALS
HEIGHT: 76 IN | SYSTOLIC BLOOD PRESSURE: 126 MMHG | BODY MASS INDEX: 21.55 KG/M2 | DIASTOLIC BLOOD PRESSURE: 74 MMHG | WEIGHT: 177 LBS

## 2022-07-19 DIAGNOSIS — H65.01 NON-RECURRENT ACUTE SEROUS OTITIS MEDIA OF RIGHT EAR: Primary | ICD-10-CM

## 2022-07-19 PROCEDURE — 99213 OFFICE O/P EST LOW 20 MIN: CPT | Performed by: PHYSICIAN ASSISTANT

## 2022-07-19 NOTE — PROGRESS NOTES
Pascual Romero is a pleasant 68-year-old  male that presents for a 2-week follow-up after treatment for a eustachian tube dysfunction of the right ear. Patient reports that he has completed his second round of medications and has noticed no major changes. He denies any drainage from the external canal also denies any issues with dizziness. Physical examination with the microscope revealed the patient to have a bulging TM to the right side with air-fluid levels noted to be present. Mobility was noted be poor to the right ear. There was no evidence of infection. Examination of the left ear demonstrated a small amount of air-fluid levels behind the TM with mobility decreased about 50%. Paz exam demonstrated localization to the right side. Neck exam demonstrated no lymphadenopathy or thyromegaly. Impression: Persistent eustachian tube dysfunction of the right ear    Plan: Due to the patient failing medical management, I recommended the patient see Dr. Laura Taylor and Spencer Travis for possible myringotomy of the right ear. Patient is agreeable and wishes to proceed. I advised the patient to continue his current medication regiment until he sees Dr. Laura Taylor.       Electronically signed by Phoebe Sen PA-C on 7/19/22 at 11:27 AM CDT

## 2022-07-22 DIAGNOSIS — I10 ESSENTIAL HYPERTENSION: Chronic | ICD-10-CM

## 2022-07-25 RX ORDER — LOSARTAN POTASSIUM AND HYDROCHLOROTHIAZIDE 25; 100 MG/1; MG/1
TABLET ORAL
Qty: 90 TABLET | Refills: 3 | Status: SHIPPED | OUTPATIENT
Start: 2022-07-25

## 2022-08-22 ENCOUNTER — OFFICE VISIT (OUTPATIENT)
Dept: ENT CLINIC | Age: 57
End: 2022-08-22
Payer: COMMERCIAL

## 2022-08-22 ENCOUNTER — PROCEDURE VISIT (OUTPATIENT)
Dept: ENT CLINIC | Age: 57
End: 2022-08-22
Payer: COMMERCIAL

## 2022-08-22 VITALS
SYSTOLIC BLOOD PRESSURE: 120 MMHG | HEIGHT: 76 IN | DIASTOLIC BLOOD PRESSURE: 76 MMHG | BODY MASS INDEX: 21.92 KG/M2 | WEIGHT: 180 LBS

## 2022-08-22 DIAGNOSIS — H65.01 NON-RECURRENT ACUTE SEROUS OTITIS MEDIA OF RIGHT EAR: Primary | ICD-10-CM

## 2022-08-22 DIAGNOSIS — H92.01 OTALGIA, RIGHT: Primary | ICD-10-CM

## 2022-08-22 PROCEDURE — 92567 TYMPANOMETRY: CPT | Performed by: AUDIOLOGIST

## 2022-08-22 PROCEDURE — 99203 OFFICE O/P NEW LOW 30 MIN: CPT | Performed by: OTOLARYNGOLOGY

## 2022-08-22 PROCEDURE — 92553 AUDIOMETRY AIR & BONE: CPT | Performed by: AUDIOLOGIST

## 2022-08-22 ASSESSMENT — ENCOUNTER SYMPTOMS
EYES NEGATIVE: 1
GASTROINTESTINAL NEGATIVE: 1
ALLERGIC/IMMUNOLOGIC NEGATIVE: 1
RESPIRATORY NEGATIVE: 1

## 2022-08-22 NOTE — PROGRESS NOTES
History   Demar Sheth is a 59 y.o. male who presented to the clinic this date with complaints of right middle ear infection. He reported symptoms have improved slightly. He initially had otalgia and aural fullness but noted that has improved. He reported decreased hearing and noted he can occasionally feel a vibration in the right ear when he speaks. He denied left ear complaints. Summary   Tympanometry consistent with normal TM mobility bilaterally. Pure tone testing indicates mild high frequency SNHL bilaterally. Results   Otoscopy:   Right: Clear EAC/Normal TM  Left: Clear EAC/Normal TM    Audiometry:   Right: Mild high frequency SNHL  Left: Mild high frequency SNHL         Tympanometry:    Right: Type A  Left: Type A    Plan   Results of today's testing were discussed with Mr. Norma Clements and the following recommendations were made: Follow up with ENT as scheduled. Monitor hearing yearly, sooner with changes. Hearing protection as warranted.         Audiogram and Acoustic Immittance

## 2022-08-22 NOTE — PROGRESS NOTES
2022    Aminah Jay (:  1965) is a 59 y.o. male, Established patient, here for evaluation of the following chief complaint(s):  New Patient (Ears)      Vitals:    22 1117   BP: 120/76   Weight: 180 lb (81.6 kg)   Height: 6' 4\" (1.93 m)       Wt Readings from Last 3 Encounters:   22 180 lb (81.6 kg)   22 177 lb (80.3 kg)   22 181 lb (82.1 kg)       BP Readings from Last 3 Encounters:   22 120/76   22 126/74   22 122/78         SUBJECTIVE/OBJECTIVE:    New patient seen today for his right ear. The patient says he had an infection on that side that seem to last for quite some time. He has been on a couple rounds of antibiotics he is feeling better. Occasional pressure but otherwise doing well. Hearing test today shows type a tympanogram.  He said the only other issue has an adult was many years ago when he had a perforation on the left. Otherwise no significant ear issues as an adult. Review of Systems   Constitutional: Negative. HENT: Negative. Eyes: Negative. Respiratory: Negative. Cardiovascular: Negative. Gastrointestinal: Negative. Endocrine: Negative. Musculoskeletal: Negative. Skin: Negative. Allergic/Immunologic: Negative. Neurological: Negative. Hematological: Negative. Psychiatric/Behavioral: Negative. Physical Exam  Vitals reviewed. Constitutional:       Appearance: Normal appearance. He is normal weight. HENT:      Head: Normocephalic and atraumatic. Right Ear: Tympanic membrane, ear canal and external ear normal.      Left Ear: Tympanic membrane, ear canal and external ear normal.      Nose: Nose normal.      Mouth/Throat:      Mouth: Mucous membranes are moist.      Pharynx: Oropharynx is clear. Eyes:      Extraocular Movements: Extraocular movements intact. Pupils: Pupils are equal, round, and reactive to light.    Cardiovascular:      Rate and Rhythm: Normal rate and regular rhythm. Pulmonary:      Effort: Pulmonary effort is normal.      Breath sounds: Normal breath sounds. Musculoskeletal:      Cervical back: Normal range of motion. Skin:     General: Skin is warm and dry. Neurological:      General: No focal deficit present. Mental Status: He is alert and oriented to person, place, and time. Psychiatric:         Mood and Affect: Mood normal.         Behavior: Behavior normal.            ASSESSMENT/PLAN:    1. Otalgia, right  Symptoms to completely resolve. If they get worse or return he is to follow-up with me. Return if symptoms worsen or fail to improve. An electronic signature was used to authenticate this note. Jose Eduardo Hardin MD       Please note that this chart was generated using dragon dictation software. Although every effort was made to ensure the accuracy of this automated transcription, some errors in transcription may have occurred.

## 2022-11-07 DIAGNOSIS — I10 ESSENTIAL HYPERTENSION: Chronic | ICD-10-CM

## 2022-11-09 RX ORDER — AMLODIPINE BESYLATE 10 MG/1
TABLET ORAL
Qty: 90 TABLET | Refills: 3 | Status: SHIPPED | OUTPATIENT
Start: 2022-11-09

## 2023-03-02 ENCOUNTER — OFFICE VISIT (OUTPATIENT)
Dept: INTERNAL MEDICINE | Age: 58
End: 2023-03-02

## 2023-03-02 VITALS
OXYGEN SATURATION: 98 % | HEIGHT: 76 IN | HEART RATE: 84 BPM | WEIGHT: 176 LBS | SYSTOLIC BLOOD PRESSURE: 134 MMHG | BODY MASS INDEX: 21.43 KG/M2 | DIASTOLIC BLOOD PRESSURE: 88 MMHG

## 2023-03-02 DIAGNOSIS — Z12.5 SCREENING FOR PROSTATE CANCER: ICD-10-CM

## 2023-03-02 DIAGNOSIS — I10 ESSENTIAL HYPERTENSION: Primary | Chronic | ICD-10-CM

## 2023-03-02 DIAGNOSIS — F31.81 BIPOLAR 2 DISORDER (HCC): ICD-10-CM

## 2023-03-02 RX ORDER — DEXTROMETHORPHAN HYDROBROMIDE, BUPROPION HYDROCHLORIDE 105; 45 MG/1; MG/1
1 TABLET, MULTILAYER, EXTENDED RELEASE ORAL 2 TIMES DAILY
COMMUNITY

## 2023-03-02 SDOH — ECONOMIC STABILITY: FOOD INSECURITY: WITHIN THE PAST 12 MONTHS, THE FOOD YOU BOUGHT JUST DIDN'T LAST AND YOU DIDN'T HAVE MONEY TO GET MORE.: NEVER TRUE

## 2023-03-02 SDOH — ECONOMIC STABILITY: FOOD INSECURITY: WITHIN THE PAST 12 MONTHS, YOU WORRIED THAT YOUR FOOD WOULD RUN OUT BEFORE YOU GOT MONEY TO BUY MORE.: NEVER TRUE

## 2023-03-02 SDOH — ECONOMIC STABILITY: HOUSING INSECURITY
IN THE LAST 12 MONTHS, WAS THERE A TIME WHEN YOU DID NOT HAVE A STEADY PLACE TO SLEEP OR SLEPT IN A SHELTER (INCLUDING NOW)?: NO

## 2023-03-02 SDOH — ECONOMIC STABILITY: INCOME INSECURITY: HOW HARD IS IT FOR YOU TO PAY FOR THE VERY BASICS LIKE FOOD, HOUSING, MEDICAL CARE, AND HEATING?: NOT HARD AT ALL

## 2023-03-02 ASSESSMENT — PATIENT HEALTH QUESTIONNAIRE - PHQ9
4. FEELING TIRED OR HAVING LITTLE ENERGY: 1
SUM OF ALL RESPONSES TO PHQ QUESTIONS 1-9: 1
8. MOVING OR SPEAKING SO SLOWLY THAT OTHER PEOPLE COULD HAVE NOTICED. OR THE OPPOSITE, BEING SO FIGETY OR RESTLESS THAT YOU HAVE BEEN MOVING AROUND A LOT MORE THAN USUAL: 0
10. IF YOU CHECKED OFF ANY PROBLEMS, HOW DIFFICULT HAVE THESE PROBLEMS MADE IT FOR YOU TO DO YOUR WORK, TAKE CARE OF THINGS AT HOME, OR GET ALONG WITH OTHER PEOPLE: 0
6. FEELING BAD ABOUT YOURSELF - OR THAT YOU ARE A FAILURE OR HAVE LET YOURSELF OR YOUR FAMILY DOWN: 0
7. TROUBLE CONCENTRATING ON THINGS, SUCH AS READING THE NEWSPAPER OR WATCHING TELEVISION: 0
2. FEELING DOWN, DEPRESSED OR HOPELESS: 0
5. POOR APPETITE OR OVEREATING: 0
1. LITTLE INTEREST OR PLEASURE IN DOING THINGS: 0
9. THOUGHTS THAT YOU WOULD BE BETTER OFF DEAD, OR OF HURTING YOURSELF: 0
3. TROUBLE FALLING OR STAYING ASLEEP: 0
SUM OF ALL RESPONSES TO PHQ QUESTIONS 1-9: 1
SUM OF ALL RESPONSES TO PHQ9 QUESTIONS 1 & 2: 0

## 2023-03-02 NOTE — PROGRESS NOTES
Chief Complaint   Patient presents with    Follow-up    Hypertension       HPI: Patient is here today to follow-up hypertension bipolar 2 and depression. He is more depressed recently had a lot of life changes and sorting things out. He is not suicidal.  Just a little more down but trying to proactively make decisions to help feel better.   Lots of life changes    Past Medical History:   Diagnosis Date    Arthritis     Bipolar disorder (HCC)     BPH (benign prostatic hypertrophy)     Constipation     Depression     GERD (gastroesophageal reflux disease)     Headache(784.0)     History of blood transfusion     Hypertension     Pelvic floor dysfunction     Pelvic floor dysfunction        Past Surgical History:   Procedure Laterality Date    ACHILLES TENDON SURGERY      CHOLECYSTECTOMY      COLONOSCOPY N/A 3/14/2019    Dr Earline Cordon yr recall    ENDOSCOPY, COLON, DIAGNOSTIC      JOINT REPLACEMENT      left ankle replacement    TONSILLECTOMY AND ADENOIDECTOMY         Family History   Problem Relation Age of Onset    Colon Cancer Neg Hx     Colon Polyps Neg Hx     Liver Cancer Neg Hx     Liver Disease Neg Hx     Esophageal Cancer Neg Hx     Stomach Cancer Neg Hx     Rectal Cancer Neg Hx        Social History     Socioeconomic History    Marital status:      Spouse name: Not on file    Number of children: Not on file    Years of education: Not on file    Highest education level: Not on file   Occupational History    Not on file   Tobacco Use    Smoking status: Every Day     Packs/day: 0.25     Years: 30.00     Pack years: 7.50     Types: Cigarettes    Smokeless tobacco: Never    Tobacco comments:     4 cigarettes per day   Vaping Use    Vaping Use: Never used   Substance and Sexual Activity    Alcohol use: No    Drug use: No    Sexual activity: Not on file   Other Topics Concern    Not on file   Social History Narrative    Not on file     Social Determinants of Health     Financial Resource Strain: Low Risk     Difficulty of Paying Living Expenses: Not hard at all   Food Insecurity: No Food Insecurity    Worried About [address] [facility] in the Last Year: Never true    Ran Out of Food in the Last Year: Never true   Transportation Needs: Unknown    Lack of Transportation (Medical): Not on file    Lack of Transportation (Non-Medical): No   Physical Activity: Not on file   Stress: Not on file   Social Connections: Not on file   Intimate Partner Violence: Not on file   Housing Stability: Unknown    Unable to Pay for Housing in the Last Year: Not on file    Number of Places Lived in the Last Year: Not on file    Unstable Housing in the Last Year: No       Allergies   Allergen Reactions    Chocolate Cough and Headaches    Nut [Peanut-Containing Drug Products]        Current Outpatient Medications   Medication Sig Dispense Refill    Dextromethorphan-buPROPion ER (AUVELITY)  MG TBCR Take 1 tablet by mouth in the morning and at bedtime      amLODIPine (NORVASC) 10 MG tablet TAKE 1 TABLET AT BEDTIME 90 tablet 3    losartan-hydroCHLOROthiazide (HYZAAR) 100-25 MG per tablet TAKE 1 TABLET DAILY 90 tablet 3    fluticasone (FLONASE) 50 MCG/ACT nasal spray 2 sprays by Each Nostril route 2 times daily 16 g 3    pseudoephedrine (DECONGESTANT) 30 MG tablet Take 1 tablet by mouth 3 times daily 30 tablet 2    cloNIDine (CATAPRES) 0.1 MG tablet TAKE 1 TABLET BY MOUTH EVERY 6 HOURS AS NEEDED FOR SYSTOLIC BLOOD PRESSURE GREATER THAN 370 AND OR DIASTOLIC BLOOD PRESSURE GREATER THAN 95 30 tablet 0    metroNIDAZOLE (FLAGYL) 500 MG tablet TAKE 1 TABLET BY MOUTH THREE TIMES DAILY FOR 10 DAYS 30 tablet 0    RABEprazole (ACIPHEX) 20 MG tablet TAKE 1 TABLET DAILY 90 tablet 3    sildenafil (VIAGRA) 50 MG tablet TAKE 1 TABLET BY MOUTH DAILY AS NEEDED.  DO NOT EXCEED ONE IN 24 HOURS 9 tablet 1    valACYclovir (VALTREX) 1 g tablet Take 2 tablets by mouth 2 times daily 30 tablet 0    lamoTRIgine (LAMICTAL) 200 MG tablet TAKE 1 TABLET BY MOUTH DAILY 90 tablet 3    SUMAtriptan (IMITREX) 100 MG tablet Take 1 tablet by mouth as needed for Migraine 9 tablet 3    folic acid (FOLVITE) 938 MCG tablet Take 400 mcg by mouth daily      aspirin 325 MG tablet Take 325 mg by mouth daily      Multiple Vitamins-Minerals (MULTIVITAMIN ADULT PO) Take 1 tablet by mouth daily       LORazepam (ATIVAN) 0.5 MG tablet TAKE 1 TABLET BY MOUTH EVERY 8 HOURS AS NEEDED FOR ANXIETY (Patient taking differently: Take 0.5 mg by mouth 3 times daily.) 90 tablet 0     No current facility-administered medications for this visit. Review of Systems    /88   Pulse 84   Ht 6' 4\" (1.93 m)   Wt 176 lb (79.8 kg)   SpO2 98%   BMI 21.42 kg/m²   BP Readings from Last 7 Encounters:   03/02/23 134/88   10/31/22 124/70   08/22/22 120/76   07/19/22 126/74   07/05/22 122/78   06/20/22 122/60   06/20/22 132/79     Wt Readings from Last 7 Encounters:   03/02/23 176 lb (79.8 kg)   10/31/22 174 lb (78.9 kg)   08/22/22 180 lb (81.6 kg)   07/19/22 177 lb (80.3 kg)   07/05/22 181 lb (82.1 kg)   06/20/22 181 lb (82.1 kg)   06/20/22 181 lb (82.1 kg)     BMI Readings from Last 7 Encounters:   03/02/23 21.42 kg/m²   10/31/22 21.18 kg/m²   08/22/22 21.91 kg/m²   07/19/22 21.55 kg/m²   07/05/22 22.03 kg/m²   06/20/22 22.03 kg/m²   06/20/22 22.03 kg/m²     Resp Readings from Last 7 Encounters:   10/01/19 20   09/27/19 18   03/14/19 16   09/01/18 18   11/09/17 18   06/17/17 20       Physical Exam  Constitutional:       General: He is not in acute distress. Eyes:      General: No scleral icterus. Cardiovascular:      Heart sounds: Normal heart sounds. Pulmonary:      Breath sounds: Normal breath sounds. Musculoskeletal:      Cervical back: Neck supple. Lymphadenopathy:      Cervical: No cervical adenopathy. Skin:     Findings: No rash.    Psychiatric:         Mood and Affect: Mood normal.       Results for orders placed or performed in visit on 06/16/22   CBC   Result Value Ref Range WBC 7.7 4.8 - 10.8 K/uL    RBC 4.34 (L) 4.70 - 6.10 M/uL    Hemoglobin 14.1 14.0 - 18.0 g/dL    Hematocrit 42.0 42.0 - 52.0 %    MCV 96.8 (H) 80.0 - 94.0 fL    MCH 32.5 (H) 27.0 - 31.0 pg    MCHC 33.6 33.0 - 37.0 g/dL    RDW 13.2 11.5 - 14.5 %    Platelets 510 745 - 826 K/uL    MPV 10.7 9.4 - 12.4 fL   Comprehensive Metabolic Panel   Result Value Ref Range    Sodium 136 136 - 145 mmol/L    Potassium 4.8 3.5 - 5.0 mmol/L    Chloride 98 98 - 111 mmol/L    CO2 28 22 - 29 mmol/L    Anion Gap 10 7 - 19 mmol/L    Glucose 101 74 - 109 mg/dL    BUN 8 6 - 20 mg/dL    Creatinine 1.0 0.5 - 1.2 mg/dL    GFR Non-African American >60 >60    GFR African American >59 >59    Calcium 9.7 8.6 - 10.0 mg/dL    Total Protein 6.7 6.6 - 8.7 g/dL    Albumin 4.4 3.5 - 5.2 g/dL    Total Bilirubin <0.2 0.2 - 1.2 mg/dL    Alkaline Phosphatase 88 40 - 130 U/L    ALT 16 5 - 41 U/L    AST 25 5 - 40 U/L   TSH   Result Value Ref Range    TSH 3.270 0.270 - 4.200 uIU/mL   Amylase   Result Value Ref Range    Amylase 99 28 - 100 U/L   Lipase   Result Value Ref Range    Lipase 67 (H) 13 - 60 U/L   Urinalysis   Result Value Ref Range    Color, UA YELLOW Straw/Yellow    Clarity, UA Clear Clear    Glucose, Ur Negative Negative mg/dL    Bilirubin Urine Negative Negative    Ketones, Urine Negative Negative mg/dL    Specific Gravity, UA 1.007 1.005 - 1.030    Blood, Urine Negative Negative    pH, UA 6.5 5.0 - 8.0    Protein, UA Negative Negative mg/dL    Urobilinogen, Urine 0.2 <2.0 E.U./dL    Nitrite, Urine Negative Negative    Leukocyte Esterase, Urine Negative Negative       ASSESSMENT/ PLAN:  1. Essential hypertension  Good bp control - follow closely    2. Bipolar 2 disorder [facility])  Patient seems more depressed today but he has a lot of major life changes he is proactively sorting things out to make the best plans for him.   Overall he is okay he does follow with Dr. Faby Infante and does follow with a counselor encouraged him to get back in with the counselor. Chart, medications, labs, vaccines reviewed. Keep up to date with routine care and follow up. Call with any problems or complaints. Keep up to date with routine screening recomendations and vaccines.    Labs ordered for next visit also

## 2023-04-20 RX ORDER — SILDENAFIL 50 MG/1
TABLET, FILM COATED ORAL
Qty: 9 TABLET | Refills: 1 | Status: SHIPPED | OUTPATIENT
Start: 2023-04-20

## 2023-05-18 RX ORDER — RABEPRAZOLE SODIUM 20 MG/1
TABLET, DELAYED RELEASE ORAL
Qty: 90 TABLET | Refills: 3 | Status: SHIPPED | OUTPATIENT
Start: 2023-05-18

## 2023-06-08 DIAGNOSIS — I10 ESSENTIAL HYPERTENSION: Chronic | ICD-10-CM

## 2023-06-08 DIAGNOSIS — Z12.5 SCREENING FOR PROSTATE CANCER: ICD-10-CM

## 2023-06-08 LAB
ALBUMIN SERPL-MCNC: 4.1 G/DL (ref 3.5–5.2)
ALP SERPL-CCNC: 63 U/L (ref 40–130)
ALT SERPL-CCNC: 18 U/L (ref 5–41)
ANION GAP SERPL CALCULATED.3IONS-SCNC: 10 MMOL/L (ref 7–19)
AST SERPL-CCNC: 29 U/L (ref 5–40)
BILIRUB SERPL-MCNC: 0.3 MG/DL (ref 0.2–1.2)
BUN SERPL-MCNC: 12 MG/DL (ref 6–20)
CALCIUM SERPL-MCNC: 10.2 MG/DL (ref 8.6–10)
CHLORIDE SERPL-SCNC: 103 MMOL/L (ref 98–111)
CHOLEST SERPL-MCNC: 172 MG/DL (ref 160–199)
CO2 SERPL-SCNC: 30 MMOL/L (ref 22–29)
CREAT SERPL-MCNC: 1.1 MG/DL (ref 0.5–1.2)
ERYTHROCYTE [DISTWIDTH] IN BLOOD BY AUTOMATED COUNT: 12.2 % (ref 11.5–14.5)
GLUCOSE SERPL-MCNC: 102 MG/DL (ref 74–109)
HCT VFR BLD AUTO: 43.3 % (ref 42–52)
HDLC SERPL-MCNC: 97 MG/DL (ref 55–121)
HGB BLD-MCNC: 14.3 G/DL (ref 14–18)
LDLC SERPL CALC-MCNC: 60 MG/DL
MCH RBC QN AUTO: 32.9 PG (ref 27–31)
MCHC RBC AUTO-ENTMCNC: 33 G/DL (ref 33–37)
MCV RBC AUTO: 99.8 FL (ref 80–94)
PLATELET # BLD AUTO: 207 K/UL (ref 130–400)
PMV BLD AUTO: 10.1 FL (ref 9.4–12.4)
POTASSIUM SERPL-SCNC: 4.6 MMOL/L (ref 3.5–5)
PROT SERPL-MCNC: 6.5 G/DL (ref 6.6–8.7)
PSA SERPL-MCNC: 2.04 NG/ML (ref 0–4)
RBC # BLD AUTO: 4.34 M/UL (ref 4.7–6.1)
SODIUM SERPL-SCNC: 143 MMOL/L (ref 136–145)
TRIGL SERPL-MCNC: 74 MG/DL (ref 0–149)
TSH SERPL DL<=0.005 MIU/L-ACNC: 3.39 UIU/ML (ref 0.27–4.2)
WBC # BLD AUTO: 7.3 K/UL (ref 4.8–10.8)

## 2023-07-11 ENCOUNTER — OFFICE VISIT (OUTPATIENT)
Dept: INTERNAL MEDICINE | Age: 58
End: 2023-07-11
Payer: COMMERCIAL

## 2023-07-11 VITALS
HEIGHT: 76 IN | SYSTOLIC BLOOD PRESSURE: 120 MMHG | OXYGEN SATURATION: 99 % | DIASTOLIC BLOOD PRESSURE: 80 MMHG | BODY MASS INDEX: 21.31 KG/M2 | HEART RATE: 96 BPM | WEIGHT: 175 LBS

## 2023-07-11 DIAGNOSIS — T14.8XXA BRUISING: ICD-10-CM

## 2023-07-11 DIAGNOSIS — M62.89 PELVIC FLOOR DYSFUNCTION: Chronic | ICD-10-CM

## 2023-07-11 DIAGNOSIS — F31.81 BIPOLAR 2 DISORDER (HCC): ICD-10-CM

## 2023-07-11 DIAGNOSIS — Z00.00 ANNUAL PHYSICAL EXAM: Primary | ICD-10-CM

## 2023-07-11 DIAGNOSIS — I10 ESSENTIAL HYPERTENSION: Chronic | ICD-10-CM

## 2023-07-11 PROCEDURE — 3074F SYST BP LT 130 MM HG: CPT | Performed by: INTERNAL MEDICINE

## 2023-07-11 PROCEDURE — 99396 PREV VISIT EST AGE 40-64: CPT | Performed by: INTERNAL MEDICINE

## 2023-07-11 PROCEDURE — 3078F DIAST BP <80 MM HG: CPT | Performed by: INTERNAL MEDICINE

## 2023-07-11 RX ORDER — DEXTROAMPHETAMINE SULFATE 30 MG/1
60 TABLET ORAL DAILY
Qty: 90 TABLET | Refills: 0
Start: 2023-07-11 | End: 2023-07-14

## 2023-07-11 RX ORDER — LORAZEPAM 0.5 MG/1
0.5 TABLET ORAL 2 TIMES DAILY PRN
Qty: 60 TABLET | Refills: 0
Start: 2023-07-11 | End: 2023-08-10

## 2023-07-11 ASSESSMENT — ENCOUNTER SYMPTOMS
ABDOMINAL DISTENTION: 0
SINUS PRESSURE: 0
EYE REDNESS: 0
COUGH: 0
BACK PAIN: 0
SHORTNESS OF BREATH: 0
ROS SKIN COMMENTS: BRUISING
EYE DISCHARGE: 0
ABDOMINAL PAIN: 0

## 2023-07-11 NOTE — PROGRESS NOTES
History Narrative    Not on file     Social Determinants of Health     Financial Resource Strain: Low Risk     Difficulty of Paying Living Expenses: Not hard at all   Food Insecurity: No Food Insecurity    Worried About [place] in the Last Year: Never true    [address] in the Last Year: Never true   Transportation Needs: Unknown    Lack of Transportation (Medical): Not on file    Lack of Transportation (Non-Medical): No   Physical Activity: Not on file   Stress: Not on file   Social Connections: Not on file   Intimate Partner Violence: Not on file   Housing Stability: Unknown    Unable to Pay for Housing in the Last Year: Not on file    Number of Places Lived in the Last Year: Not on file    Unstable Housing in the Last Year: No       Allergies   Allergen Reactions    Chocolate Cough and Headaches    Nut [Peanut-Containing Drug Products]        Current Outpatient Medications   Medication Sig Dispense Refill    Dextroamphetamine Sulfate 30 MG TABS Take 60 mg by mouth daily for 90 days. Max Daily Amount: 60 mg 90 tablet 0    LORazepam (ATIVAN) 0.5 MG tablet Take 1 tablet by mouth 2 times daily as needed for Anxiety for up to 30 days. Max Daily Amount: 1 mg 60 tablet 0    losartan-hydroCHLOROthiazide (HYZAAR) 100-25 MG per tablet Take 1 tablet by mouth daily 90 tablet 3    RABEprazole (ACIPHEX) 20 MG tablet TAKE 1 TABLET DAILY 90 tablet 3    sildenafil (VIAGRA) 50 MG tablet TAKE 1 TABLET BY MOUTH DAILY AS NEEDED.  DO NOT EXCEED 1 IN 24 HOURS 9 tablet 1    amLODIPine (NORVASC) 10 MG tablet TAKE 1 TABLET AT BEDTIME 90 tablet 3    cloNIDine (CATAPRES) 0.1 MG tablet TAKE 1 TABLET BY MOUTH EVERY 6 HOURS AS NEEDED FOR SYSTOLIC BLOOD PRESSURE GREATER THAN 743 AND OR DIASTOLIC BLOOD PRESSURE GREATER THAN 95 30 tablet 0    lamoTRIgine (LAMICTAL) 200 MG tablet TAKE 1 TABLET BY MOUTH DAILY 90 tablet 3    folic acid (FOLVITE) 042 MCG tablet Take 400 mcg by mouth daily      Multiple Vitamins-Minerals (MULTIVITAMIN

## 2023-07-12 DIAGNOSIS — I10 ESSENTIAL HYPERTENSION: Chronic | ICD-10-CM

## 2023-07-12 RX ORDER — LOSARTAN POTASSIUM AND HYDROCHLOROTHIAZIDE 25; 100 MG/1; MG/1
1 TABLET ORAL DAILY
Qty: 90 TABLET | Refills: 3 | Status: SHIPPED | OUTPATIENT
Start: 2023-07-12

## 2023-09-11 DIAGNOSIS — I10 ESSENTIAL HYPERTENSION: Chronic | ICD-10-CM

## 2023-09-12 RX ORDER — AMLODIPINE BESYLATE 10 MG/1
TABLET ORAL
Qty: 90 TABLET | Refills: 3 | Status: SHIPPED | OUTPATIENT
Start: 2023-09-12

## 2024-01-12 DIAGNOSIS — I10 ESSENTIAL HYPERTENSION: Chronic | ICD-10-CM

## 2024-01-12 LAB
ALBUMIN SERPL-MCNC: 4.5 G/DL (ref 3.5–5.2)
ALP SERPL-CCNC: 80 U/L (ref 40–130)
ALT SERPL-CCNC: 18 U/L (ref 5–41)
ANION GAP SERPL CALCULATED.3IONS-SCNC: 11 MMOL/L (ref 7–19)
AST SERPL-CCNC: 30 U/L (ref 5–40)
BILIRUB SERPL-MCNC: 0.6 MG/DL (ref 0.2–1.2)
BUN SERPL-MCNC: 9 MG/DL (ref 8–23)
CALCIUM SERPL-MCNC: 10 MG/DL (ref 8.8–10.2)
CHLORIDE SERPL-SCNC: 98 MMOL/L (ref 98–111)
CO2 SERPL-SCNC: 31 MMOL/L (ref 22–29)
CREAT SERPL-MCNC: 0.9 MG/DL (ref 0.5–1.2)
ERYTHROCYTE [DISTWIDTH] IN BLOOD BY AUTOMATED COUNT: 13.3 % (ref 11.5–14.5)
GLUCOSE SERPL-MCNC: 123 MG/DL (ref 74–109)
HCT VFR BLD AUTO: 45.9 % (ref 42–52)
HDLC SERPL-MCNC: 130 MG/DL (ref 55–121)
HGB BLD-MCNC: 15.3 G/DL (ref 14–18)
LDLC SERPL CALC-MCNC: 57 MG/DL
MCH RBC QN AUTO: 33.9 PG (ref 27–31)
MCHC RBC AUTO-ENTMCNC: 33.3 G/DL (ref 33–37)
MCV RBC AUTO: 101.8 FL (ref 80–94)
PLATELET # BLD AUTO: 237 K/UL (ref 130–400)
PMV BLD AUTO: 9.6 FL (ref 9.4–12.4)
POTASSIUM SERPL-SCNC: 4.8 MMOL/L (ref 3.5–5)
PROT SERPL-MCNC: 6.9 G/DL (ref 6.6–8.7)
RBC # BLD AUTO: 4.51 M/UL (ref 4.7–6.1)
SODIUM SERPL-SCNC: 140 MMOL/L (ref 136–145)
TRIGL SERPL-MCNC: 48 MG/DL (ref 0–149)
TSH SERPL DL<=0.005 MIU/L-ACNC: 2.37 UIU/ML (ref 0.27–4.2)
WBC # BLD AUTO: 6.1 K/UL (ref 4.8–10.8)

## 2024-01-15 ENCOUNTER — OFFICE VISIT (OUTPATIENT)
Dept: INTERNAL MEDICINE | Age: 59
End: 2024-01-15

## 2024-01-15 VITALS
OXYGEN SATURATION: 100 % | DIASTOLIC BLOOD PRESSURE: 84 MMHG | BODY MASS INDEX: 21.67 KG/M2 | WEIGHT: 178 LBS | HEART RATE: 82 BPM | SYSTOLIC BLOOD PRESSURE: 142 MMHG

## 2024-01-15 DIAGNOSIS — R73.9 HYPERGLYCEMIA: ICD-10-CM

## 2024-01-15 DIAGNOSIS — Z87.891 PERSONAL HISTORY OF TOBACCO USE: ICD-10-CM

## 2024-01-15 DIAGNOSIS — R53.83 OTHER FATIGUE: ICD-10-CM

## 2024-01-15 DIAGNOSIS — I10 ESSENTIAL HYPERTENSION: Primary | Chronic | ICD-10-CM

## 2024-01-15 DIAGNOSIS — N52.8 OTHER MALE ERECTILE DYSFUNCTION: ICD-10-CM

## 2024-01-15 DIAGNOSIS — F31.81 BIPOLAR 2 DISORDER (HCC): ICD-10-CM

## 2024-01-15 RX ORDER — BUPROPION HYDROCHLORIDE 300 MG/1
300 TABLET ORAL EVERY MORNING
Qty: 30 TABLET | Refills: 3
Start: 2024-01-15

## 2024-01-15 RX ORDER — TADALAFIL 5 MG/1
5 TABLET ORAL DAILY
Qty: 90 TABLET | Refills: 1
Start: 2024-01-15

## 2024-01-15 NOTE — PATIENT INSTRUCTIONS
follow-up, he or she will help you understand what to do next.  After a lung cancer screening, you can go back to your usual activities right away.  A lung cancer screening test can't tell if you have lung cancer. If your results are positive, your doctor can't tell whether an abnormal finding is a harmless nodule, cancer, or something else without doing more tests.  What can you do to help prevent lung cancer?  Some lung cancers can't be prevented. But if you smoke, quitting smoking is the best step you can take to prevent lung cancer. If you want to quit, your doctor can recommend medicines or other ways to help.  Follow-up care is a key part of your treatment and safety. Be sure to make and go to all appointments, and call your doctor if you are having problems. It's also a good idea to know your test results and keep a list of the medicines you take.  Where can you learn more?  Go to https://www.[employer].net/patientEd and enter Q940 to learn more about \"Learning About Lung Cancer Screening.\"  Current as of: February 28, 2023               Content Version: 13.9  © 0982-7666 [email or web address].   Care instructions adapted under license by [employer]. If you have questions about a medical condition or this instruction, always ask your healthcare professional. [email or web address] disclaims any warranty or liability for your use of this information.

## 2024-01-15 NOTE — PROGRESS NOTES
Chief Complaint   Patient presents with    Follow-up       HPI: Patient is here today for follow-up -reviewing his lab he hasome hyperglycemia and some other labs that our of range.  He says he has been drinking more alcohol at night drinks several beers.  Very upfront and open about this and going to work to stop or cut back on this.  We also talked about tobacco history.  Mood seems ok.  No cp or dypsnea or abd pain.  Seems to feel ok.  Questions re: ED and fertility.  23-22 years old-- smoked 1- 1/2 pack a day     Past Medical History:   Diagnosis Date    Arthritis     Bipolar disorder (HCC)     BPH (benign prostatic hypertrophy)     Constipation     Depression     GERD (gastroesophageal reflux disease)     Headache(784.0)     History of blood transfusion     Hypertension     Pelvic floor dysfunction     Pelvic floor dysfunction        Past Surgical History:   Procedure Laterality Date    ACHILLES TENDON SURGERY      CHOLECYSTECTOMY      COLONOSCOPY N/A 3/14/2019    Dr GARDENIA Stratton-Diverticular disease-5 yr recall    ENDOSCOPY, COLON, DIAGNOSTIC      JOINT REPLACEMENT      left ankle replacement    TONSILLECTOMY AND ADENOIDECTOMY         Family History   Problem Relation Age of Onset    Colon Cancer Neg Hx     Colon Polyps Neg Hx     Liver Cancer Neg Hx     Liver Disease Neg Hx     Esophageal Cancer Neg Hx     Stomach Cancer Neg Hx     Rectal Cancer Neg Hx        Social History     Socioeconomic History    Marital status:      Spouse name: Not on file    Number of children: Not on file    Years of education: Not on file    Highest education level: Not on file   Occupational History    Not on file   Tobacco Use    Smoking status: Every Day     Current packs/day: 0.25     Average packs/day: 0.3 packs/day for 30.0 years (7.5 ttl pk-yrs)     Types: Cigarettes    Smokeless tobacco: Never    Tobacco comments:     4 cigarettes per day   Vaping Use    Vaping Use: Never used   Substance and Sexual Activity    Alcohol

## 2024-01-19 DIAGNOSIS — R53.83 OTHER FATIGUE: ICD-10-CM

## 2024-01-19 DIAGNOSIS — N52.8 OTHER MALE ERECTILE DYSFUNCTION: ICD-10-CM

## 2024-01-19 DIAGNOSIS — R73.9 HYPERGLYCEMIA: ICD-10-CM

## 2024-01-19 LAB
ANION GAP SERPL CALCULATED.3IONS-SCNC: 8 MMOL/L (ref 7–19)
BUN SERPL-MCNC: 9 MG/DL (ref 8–23)
CALCIUM SERPL-MCNC: 10.2 MG/DL (ref 8.8–10.2)
CHLORIDE SERPL-SCNC: 99 MMOL/L (ref 98–111)
CO2 SERPL-SCNC: 32 MMOL/L (ref 22–29)
CORTIS AM PEAK SERPL-MCNC: 21.7 UG/DL (ref 6.2–19.4)
CREAT SERPL-MCNC: 0.9 MG/DL (ref 0.5–1.2)
GLUCOSE SERPL-MCNC: 130 MG/DL (ref 74–109)
HBA1C MFR BLD: 5 % (ref 4–6)
POTASSIUM SERPL-SCNC: 5.1 MMOL/L (ref 3.5–5)
SODIUM SERPL-SCNC: 139 MMOL/L (ref 136–145)

## 2024-01-23 LAB
SHBG SERPL-SCNC: 36 NMOL/L (ref 11–80)
SHBG SERPL-SCNC: 89.6 PG/ML (ref 47–244)
TESTOST SERPL-MCNC: 456 NG/DL (ref 220–1000)

## 2024-04-12 DIAGNOSIS — R73.9 HYPERGLYCEMIA: ICD-10-CM

## 2024-04-12 DIAGNOSIS — I10 ESSENTIAL HYPERTENSION: Chronic | ICD-10-CM

## 2024-04-12 DIAGNOSIS — Z13.21 ENCOUNTER FOR VITAMIN DEFICIENCY SCREENING: ICD-10-CM

## 2024-04-12 DIAGNOSIS — D75.89 MACROCYTOSIS WITHOUT ANEMIA: ICD-10-CM

## 2024-04-12 DIAGNOSIS — N52.8 OTHER MALE ERECTILE DYSFUNCTION: ICD-10-CM

## 2024-04-12 LAB
25(OH)D3 SERPL-MCNC: 71.4 NG/ML
ALBUMIN SERPL-MCNC: 4.2 G/DL (ref 3.5–5.2)
ALP SERPL-CCNC: 74 U/L (ref 40–130)
ALT SERPL-CCNC: 36 U/L (ref 5–41)
ANION GAP SERPL CALCULATED.3IONS-SCNC: 15 MMOL/L (ref 7–19)
AST SERPL-CCNC: 43 U/L (ref 5–40)
BASOPHILS # BLD: 0 K/UL (ref 0–0.2)
BASOPHILS NFR BLD: 0.3 % (ref 0–1)
BILIRUB SERPL-MCNC: 0.3 MG/DL (ref 0.2–1.2)
BUN SERPL-MCNC: 15 MG/DL (ref 8–23)
CALCIUM SERPL-MCNC: 9.6 MG/DL (ref 8.8–10.2)
CHLORIDE SERPL-SCNC: 100 MMOL/L (ref 98–111)
CHOLEST SERPL-MCNC: 191 MG/DL (ref 160–199)
CO2 SERPL-SCNC: 26 MMOL/L (ref 22–29)
CREAT SERPL-MCNC: 1.1 MG/DL (ref 0.5–1.2)
EOSINOPHIL # BLD: 0 K/UL (ref 0–0.6)
EOSINOPHIL NFR BLD: 0.3 % (ref 0–5)
ERYTHROCYTE [DISTWIDTH] IN BLOOD BY AUTOMATED COUNT: 12.2 % (ref 11.5–14.5)
FOLATE SERPL-MCNC: >20 NG/ML (ref 4.5–32.2)
GLUCOSE SERPL-MCNC: 115 MG/DL (ref 74–109)
HBA1C MFR BLD: 5 % (ref 4–6)
HCT VFR BLD AUTO: 40 % (ref 42–52)
HDLC SERPL-MCNC: 127 MG/DL (ref 55–121)
HGB BLD-MCNC: 13.1 G/DL (ref 14–18)
IMM GRANULOCYTES # BLD: 0 K/UL
LDLC SERPL CALC-MCNC: 59 MG/DL
LYMPHOCYTES # BLD: 0.8 K/UL (ref 1.1–4.5)
LYMPHOCYTES NFR BLD: 13.9 % (ref 20–40)
MCH RBC QN AUTO: 32.3 PG (ref 27–31)
MCHC RBC AUTO-ENTMCNC: 32.8 G/DL (ref 33–37)
MCV RBC AUTO: 98.8 FL (ref 80–94)
MONOCYTES # BLD: 0.6 K/UL (ref 0–0.9)
MONOCYTES NFR BLD: 10.4 % (ref 0–10)
NEUTROPHILS # BLD: 4.3 K/UL (ref 1.5–7.5)
NEUTS SEG NFR BLD: 74.8 % (ref 50–65)
PLATELET # BLD AUTO: 215 K/UL (ref 130–400)
PMV BLD AUTO: 9.9 FL (ref 9.4–12.4)
POTASSIUM SERPL-SCNC: 4.6 MMOL/L (ref 3.5–5)
PROT SERPL-MCNC: 6.4 G/DL (ref 6.6–8.7)
RBC # BLD AUTO: 4.05 M/UL (ref 4.7–6.1)
SODIUM SERPL-SCNC: 141 MMOL/L (ref 136–145)
TRIGL SERPL-MCNC: 27 MG/DL (ref 0–149)
TSH SERPL DL<=0.005 MIU/L-ACNC: 2.85 UIU/ML (ref 0.27–4.2)
VIT B12 SERPL-MCNC: 486 PG/ML (ref 211–946)
WBC # BLD AUTO: 5.8 K/UL (ref 4.8–10.8)

## 2024-04-15 ENCOUNTER — OFFICE VISIT (OUTPATIENT)
Dept: INTERNAL MEDICINE | Age: 59
End: 2024-04-15
Payer: COMMERCIAL

## 2024-04-15 VITALS
DIASTOLIC BLOOD PRESSURE: 72 MMHG | OXYGEN SATURATION: 99 % | HEIGHT: 76 IN | BODY MASS INDEX: 21.31 KG/M2 | WEIGHT: 175 LBS | SYSTOLIC BLOOD PRESSURE: 124 MMHG | HEART RATE: 90 BPM

## 2024-04-15 DIAGNOSIS — R74.01 ELEVATED AST (SGOT): ICD-10-CM

## 2024-04-15 DIAGNOSIS — R73.9 HYPERGLYCEMIA: ICD-10-CM

## 2024-04-15 DIAGNOSIS — F31.81 BIPOLAR 2 DISORDER (HCC): ICD-10-CM

## 2024-04-15 DIAGNOSIS — M62.89 PELVIC FLOOR DYSFUNCTION: Chronic | ICD-10-CM

## 2024-04-15 DIAGNOSIS — I10 ESSENTIAL HYPERTENSION: Primary | Chronic | ICD-10-CM

## 2024-04-15 DIAGNOSIS — D72.819 LEUKOPENIA, UNSPECIFIED TYPE: ICD-10-CM

## 2024-04-15 PROCEDURE — 99214 OFFICE O/P EST MOD 30 MIN: CPT | Performed by: INTERNAL MEDICINE

## 2024-04-15 PROCEDURE — 3074F SYST BP LT 130 MM HG: CPT | Performed by: INTERNAL MEDICINE

## 2024-04-15 PROCEDURE — 3078F DIAST BP <80 MM HG: CPT | Performed by: INTERNAL MEDICINE

## 2024-04-15 SDOH — ECONOMIC STABILITY: FOOD INSECURITY: WITHIN THE PAST 12 MONTHS, THE FOOD YOU BOUGHT JUST DIDN'T LAST AND YOU DIDN'T HAVE MONEY TO GET MORE.: NEVER TRUE

## 2024-04-15 SDOH — ECONOMIC STABILITY: TRANSPORTATION INSECURITY
IN THE PAST 12 MONTHS, HAS LACK OF TRANSPORTATION KEPT YOU FROM MEETINGS, WORK, OR FROM GETTING THINGS NEEDED FOR DAILY LIVING?: NO

## 2024-04-15 SDOH — ECONOMIC STABILITY: FOOD INSECURITY: WITHIN THE PAST 12 MONTHS, YOU WORRIED THAT YOUR FOOD WOULD RUN OUT BEFORE YOU GOT MONEY TO BUY MORE.: NEVER TRUE

## 2024-04-15 SDOH — ECONOMIC STABILITY: INCOME INSECURITY: HOW HARD IS IT FOR YOU TO PAY FOR THE VERY BASICS LIKE FOOD, HOUSING, MEDICAL CARE, AND HEATING?: NOT HARD AT ALL

## 2024-04-15 ASSESSMENT — PATIENT HEALTH QUESTIONNAIRE - PHQ9
4. FEELING TIRED OR HAVING LITTLE ENERGY: NOT AT ALL
SUM OF ALL RESPONSES TO PHQ9 QUESTIONS 1 & 2: 0
6. FEELING BAD ABOUT YOURSELF - OR THAT YOU ARE A FAILURE OR HAVE LET YOURSELF OR YOUR FAMILY DOWN: NOT AT ALL
1. LITTLE INTEREST OR PLEASURE IN DOING THINGS: NOT AT ALL
4. FEELING TIRED OR HAVING LITTLE ENERGY: NOT AT ALL
SUM OF ALL RESPONSES TO PHQ QUESTIONS 1-9: 0
SUM OF ALL RESPONSES TO PHQ QUESTIONS 1-9: 0
9. THOUGHTS THAT YOU WOULD BE BETTER OFF DEAD, OR OF HURTING YOURSELF: NOT AT ALL
9. THOUGHTS THAT YOU WOULD BE BETTER OFF DEAD, OR OF HURTING YOURSELF: NOT AT ALL
6. FEELING BAD ABOUT YOURSELF - OR THAT YOU ARE A FAILURE OR HAVE LET YOURSELF OR YOUR FAMILY DOWN: NOT AT ALL
2. FEELING DOWN, DEPRESSED OR HOPELESS: NOT AT ALL
5. POOR APPETITE OR OVEREATING: NOT AT ALL
SUM OF ALL RESPONSES TO PHQ QUESTIONS 1-9: 0
1. LITTLE INTEREST OR PLEASURE IN DOING THINGS: NOT AT ALL
SUM OF ALL RESPONSES TO PHQ QUESTIONS 1-9: 0
5. POOR APPETITE OR OVEREATING: NOT AT ALL
7. TROUBLE CONCENTRATING ON THINGS, SUCH AS READING THE NEWSPAPER OR WATCHING TELEVISION: NOT AT ALL
8. MOVING OR SPEAKING SO SLOWLY THAT OTHER PEOPLE COULD HAVE NOTICED. OR THE OPPOSITE, BEING SO FIGETY OR RESTLESS THAT YOU HAVE BEEN MOVING AROUND A LOT MORE THAN USUAL: NOT AT ALL
10. IF YOU CHECKED OFF ANY PROBLEMS, HOW DIFFICULT HAVE THESE PROBLEMS MADE IT FOR YOU TO DO YOUR WORK, TAKE CARE OF THINGS AT HOME, OR GET ALONG WITH OTHER PEOPLE: NOT DIFFICULT AT ALL
3. TROUBLE FALLING OR STAYING ASLEEP: NOT AT ALL
3. TROUBLE FALLING OR STAYING ASLEEP: NOT AT ALL
8. MOVING OR SPEAKING SO SLOWLY THAT OTHER PEOPLE COULD HAVE NOTICED. OR THE OPPOSITE - BEING SO FIDGETY OR RESTLESS THAT YOU HAVE BEEN MOVING AROUND A LOT MORE THAN USUAL: NOT AT ALL
7. TROUBLE CONCENTRATING ON THINGS, SUCH AS READING THE NEWSPAPER OR WATCHING TELEVISION: NOT AT ALL
SUM OF ALL RESPONSES TO PHQ QUESTIONS 1-9: 0
10. IF YOU CHECKED OFF ANY PROBLEMS, HOW DIFFICULT HAVE THESE PROBLEMS MADE IT FOR YOU TO DO YOUR WORK, TAKE CARE OF THINGS AT HOME, OR GET ALONG WITH OTHER PEOPLE: NOT DIFFICULT AT ALL
2. FEELING DOWN, DEPRESSED OR HOPELESS: NOT AT ALL

## 2024-04-15 NOTE — PROGRESS NOTES
Chief Complaint   Patient presents with    3 Month Follow-Up    Hypertension       HPI: Patient is here today to follow-up hypertension and depression patient with underlying bipolar disorder type II with issues with depression recently he also has elevated liver enzymes and has admitted to drinking alcohol at night both out of habit and now routine.  He is aware that he needs to back down on this.  Patient goes to psychiatry Dr. Anjum Gale I told him to share this with him he also has seen counseling in the past I would really like him to get back in with his counselor.  Blood pressure at home okay.  Past Medical History:   Diagnosis Date    Arthritis     Bipolar disorder (HCC)     BPH (benign prostatic hypertrophy)     Constipation     Depression     GERD (gastroesophageal reflux disease)     Headache(784.0)     History of blood transfusion     Hypertension     Pelvic floor dysfunction     Pelvic floor dysfunction        Past Surgical History:   Procedure Laterality Date    ACHILLES TENDON SURGERY      CHOLECYSTECTOMY      COLONOSCOPY N/A 3/14/2019    Dr GARDENIA Stratton-Diverticular disease-5 yr recall    ENDOSCOPY, COLON, DIAGNOSTIC      JOINT REPLACEMENT      left ankle replacement    TONSILLECTOMY AND ADENOIDECTOMY         Family History   Problem Relation Age of Onset    Colon Cancer Neg Hx     Colon Polyps Neg Hx     Liver Cancer Neg Hx     Liver Disease Neg Hx     Esophageal Cancer Neg Hx     Stomach Cancer Neg Hx     Rectal Cancer Neg Hx        Social History     Socioeconomic History    Marital status:      Spouse name: Not on file    Number of children: Not on file    Years of education: Not on file    Highest education level: Not on file   Occupational History    Not on file   Tobacco Use    Smoking status: Every Day     Current packs/day: 0.25     Average packs/day: 0.3 packs/day for 30.0 years (7.5 ttl pk-yrs)     Types: Cigarettes    Smokeless tobacco: Never    Tobacco comments:     4 cigarettes per

## 2024-04-16 LAB
SHBG SERPL-SCNC: 133.2 PG/ML (ref 47–244)
SHBG SERPL-SCNC: 73 NMOL/L (ref 19–76)
TESTOST SERPL-MCNC: 977 NG/DL (ref 193–740)

## 2024-05-06 RX ORDER — RABEPRAZOLE SODIUM 20 MG/1
TABLET, DELAYED RELEASE ORAL
Qty: 90 TABLET | Refills: 3 | Status: SHIPPED | OUTPATIENT
Start: 2024-05-06

## 2024-05-28 PROBLEM — R43.0 LOSS OF SMELL: Status: RESOLVED | Noted: 2021-08-05 | Resolved: 2024-05-28

## 2024-05-28 PROBLEM — H65.01 NON-RECURRENT ACUTE SEROUS OTITIS MEDIA OF RIGHT EAR: Status: RESOLVED | Noted: 2022-06-20 | Resolved: 2024-05-28

## 2024-06-28 DIAGNOSIS — I10 ESSENTIAL HYPERTENSION: Chronic | ICD-10-CM

## 2024-07-01 RX ORDER — AMLODIPINE BESYLATE 10 MG/1
TABLET ORAL
Qty: 90 TABLET | Refills: 3 | Status: SHIPPED | OUTPATIENT
Start: 2024-07-01

## 2024-07-05 DIAGNOSIS — I10 ESSENTIAL HYPERTENSION: Chronic | ICD-10-CM

## 2024-07-05 RX ORDER — LOSARTAN POTASSIUM AND HYDROCHLOROTHIAZIDE 25; 100 MG/1; MG/1
1 TABLET ORAL DAILY
Qty: 90 TABLET | Refills: 3 | Status: SHIPPED | OUTPATIENT
Start: 2024-07-05

## 2024-07-31 ENCOUNTER — OFFICE VISIT (OUTPATIENT)
Dept: INTERNAL MEDICINE | Age: 59
End: 2024-07-31
Payer: COMMERCIAL

## 2024-07-31 VITALS
OXYGEN SATURATION: 98 % | BODY MASS INDEX: 20.81 KG/M2 | SYSTOLIC BLOOD PRESSURE: 150 MMHG | TEMPERATURE: 98.8 F | DIASTOLIC BLOOD PRESSURE: 88 MMHG | HEART RATE: 84 BPM | WEIGHT: 171 LBS

## 2024-07-31 DIAGNOSIS — L03.90 CELLULITIS, UNSPECIFIED CELLULITIS SITE: ICD-10-CM

## 2024-07-31 DIAGNOSIS — L02.91 ABSCESS: Primary | ICD-10-CM

## 2024-07-31 PROCEDURE — 3079F DIAST BP 80-89 MM HG: CPT | Performed by: NURSE PRACTITIONER

## 2024-07-31 PROCEDURE — 3077F SYST BP >= 140 MM HG: CPT | Performed by: NURSE PRACTITIONER

## 2024-07-31 PROCEDURE — 99213 OFFICE O/P EST LOW 20 MIN: CPT | Performed by: NURSE PRACTITIONER

## 2024-07-31 RX ORDER — CLINDAMYCIN HYDROCHLORIDE 300 MG/1
300 CAPSULE ORAL 3 TIMES DAILY
Qty: 30 CAPSULE | Refills: 0 | Status: SHIPPED | OUTPATIENT
Start: 2024-07-31 | End: 2024-08-10

## 2024-07-31 NOTE — PROGRESS NOTES
[facility]  [facility] INTERNAL MEDICINE  [address] [place] [address] DRIVE  [address]  [place] [address]  Dept: [phone]  Dept Fax: [phone]  Loc: [phone]    Vignesh Gale is a 59 y.o. male who presents today for his medical conditions/complaintsas noted below.  Vignesh Gale is c/o of Other (Had Subaceous Cysts in the past - just went on trip and long ride after sweating. Painful when moving)        HPI:     CARLOS Guzmán presents today with a spot on his back. Started it Sunday night and it is getting worse. Has history of sebaceous cysts in the past.  He did just go on a trip in a long ride and was very sweaty.  Now the area is more painful with moving and appears the redness has spread.  He reports no fever or chills.  He has had no vomiting.  The main concern today is that area and a spreading redness.  Past Medical History:   Diagnosis Date    Arthritis     Bipolar disorder (HCC)     BPH (benign prostatic hypertrophy)     Constipation     Depression     GERD (gastroesophageal reflux disease)     Headache(784.0)     History of blood transfusion     Hypertension     Pelvic floor dysfunction     Pelvic floor dysfunction      Past Surgical History:   Procedure Laterality Date    ACHILLES TENDON SURGERY      CHOLECYSTECTOMY      COLONOSCOPY N/A 3/14/2019    Dr GARDENIA Stratton-Diverticular disease-5 yr recall    ENDOSCOPY, COLON, DIAGNOSTIC      JOINT REPLACEMENT      left ankle replacement    TONSILLECTOMY AND ADENOIDECTOMY         Family History   Problem Relation Age of Onset    Colon Cancer Neg Hx     Colon Polyps Neg Hx     Liver Cancer Neg Hx     Liver Disease Neg Hx     Esophageal Cancer Neg Hx     Stomach Cancer Neg Hx     Rectal Cancer Neg Hx        Social History     Tobacco Use    Smoking status: Every Day     Current packs/day: 0.25     Average packs/day: 0.3 packs/day for 30.0 years (7.5 ttl pk-yrs)     Types: Cigarettes    Smokeless tobacco: Never    Tobacco comments:     4 cigarettes per day

## 2024-08-01 ENCOUNTER — HOSPITAL ENCOUNTER (EMERGENCY)
Age: 59
Discharge: HOME | End: 2024-08-02
Payer: COMMERCIAL

## 2024-08-01 DIAGNOSIS — L02.91 ABSCESS: Primary | ICD-10-CM

## 2024-08-01 LAB
ALBUMIN SERPL-MCNC: 4.2 G/DL (ref 3.5–5.2)
ALP SERPL-CCNC: 75 U/L (ref 40–130)
ALT SERPL-CCNC: 16 U/L (ref 5–41)
ANION GAP SERPL CALCULATED.3IONS-SCNC: 14 MMOL/L (ref 7–19)
AST SERPL-CCNC: 27 U/L (ref 5–40)
BASOPHILS # BLD: 0 K/UL (ref 0–0.2)
BASOPHILS NFR BLD: 0.3 % (ref 0–1)
BILIRUB SERPL-MCNC: 0.2 MG/DL (ref 0.2–1.2)
BUN SERPL-MCNC: 9 MG/DL (ref 8–23)
CALCIUM SERPL-MCNC: 9.9 MG/DL (ref 8.8–10.2)
CHLORIDE SERPL-SCNC: 98 MMOL/L (ref 98–111)
CO2 SERPL-SCNC: 27 MMOL/L (ref 22–29)
CREAT SERPL-MCNC: 0.8 MG/DL (ref 0.5–1.2)
CRP SERPL HS-MCNC: 4.26 MG/DL (ref 0–0.5)
EOSINOPHIL # BLD: 0.1 K/UL (ref 0–0.6)
EOSINOPHIL NFR BLD: 1.1 % (ref 0–5)
ERYTHROCYTE [DISTWIDTH] IN BLOOD BY AUTOMATED COUNT: 12.2 % (ref 11.5–14.5)
GLUCOSE SERPL-MCNC: 78 MG/DL (ref 74–109)
HCT VFR BLD AUTO: 42.2 % (ref 42–52)
HGB BLD-MCNC: 14.1 G/DL (ref 14–18)
IMM GRANULOCYTES # BLD: 0.1 K/UL
LYMPHOCYTES # BLD: 1.2 K/UL (ref 1.1–4.5)
LYMPHOCYTES NFR BLD: 11.6 % (ref 20–40)
MCH RBC QN AUTO: 32.1 PG (ref 27–31)
MCHC RBC AUTO-ENTMCNC: 33.4 G/DL (ref 33–37)
MCV RBC AUTO: 96.1 FL (ref 80–94)
MONOCYTES # BLD: 1.2 K/UL (ref 0–0.9)
MONOCYTES NFR BLD: 11.3 % (ref 0–10)
NEUTROPHILS # BLD: 7.9 K/UL (ref 1.5–7.5)
NEUTS SEG NFR BLD: 75.2 % (ref 50–65)
PLATELET # BLD AUTO: 253 K/UL (ref 130–400)
PMV BLD AUTO: 9.3 FL (ref 9.4–12.4)
POTASSIUM SERPL-SCNC: 4.3 MMOL/L (ref 3.5–5)
PROT SERPL-MCNC: 7.1 G/DL (ref 6.6–8.7)
RBC # BLD AUTO: 4.39 M/UL (ref 4.7–6.1)
SODIUM SERPL-SCNC: 139 MMOL/L (ref 136–145)
WBC # BLD AUTO: 10.5 K/UL (ref 4.8–10.8)

## 2024-08-01 PROCEDURE — 10060 I&D ABSCESS SIMPLE/SINGLE: CPT

## 2024-08-01 PROCEDURE — 99284 EMERGENCY DEPT VISIT MOD MDM: CPT

## 2024-08-01 PROCEDURE — 99283 EMERGENCY DEPT VISIT LOW MDM: CPT

## 2024-08-01 ASSESSMENT — ENCOUNTER SYMPTOMS: VOMITING: 0

## 2024-08-02 VITALS
OXYGEN SATURATION: 98 % | HEART RATE: 77 BPM | RESPIRATION RATE: 18 BRPM | SYSTOLIC BLOOD PRESSURE: 151 MMHG | BODY MASS INDEX: 20.69 KG/M2 | DIASTOLIC BLOOD PRESSURE: 89 MMHG | WEIGHT: 170 LBS | TEMPERATURE: 98.3 F

## 2024-08-02 LAB — ERYTHROCYTE [SEDIMENTATION RATE] IN BLOOD BY WESTERGREN METHOD: 22 MM/HR (ref 0–15)

## 2024-08-02 PROCEDURE — 87077 CULTURE AEROBIC IDENTIFY: CPT

## 2024-08-02 PROCEDURE — 36415 COLL VENOUS BLD VENIPUNCTURE: CPT

## 2024-08-02 PROCEDURE — 85025 COMPLETE CBC W/AUTO DIFF WBC: CPT

## 2024-08-02 PROCEDURE — 96374 THER/PROPH/DIAG INJ IV PUSH: CPT

## 2024-08-02 PROCEDURE — 6360000002 HC RX W HCPCS: Performed by: NURSE PRACTITIONER

## 2024-08-02 PROCEDURE — 85652 RBC SED RATE AUTOMATED: CPT

## 2024-08-02 PROCEDURE — 86140 C-REACTIVE PROTEIN: CPT

## 2024-08-02 PROCEDURE — 2580000003 HC RX 258: Performed by: NURSE PRACTITIONER

## 2024-08-02 PROCEDURE — 87070 CULTURE OTHR SPECIMN AEROBIC: CPT

## 2024-08-02 PROCEDURE — 87205 SMEAR GRAM STAIN: CPT

## 2024-08-02 PROCEDURE — 80053 COMPREHEN METABOLIC PANEL: CPT

## 2024-08-02 PROCEDURE — 87186 SC STD MICRODIL/AGAR DIL: CPT

## 2024-08-02 PROCEDURE — 87075 CULTR BACTERIA EXCEPT BLOOD: CPT

## 2024-08-02 RX ADMIN — WATER 1000 MG: 1 INJECTION INTRAMUSCULAR; INTRAVENOUS; SUBCUTANEOUS at 00:21

## 2024-08-02 NOTE — ED PROVIDER NOTES
dictation.    EMERGENCY DEPARTMENT COURSE and DIFFERENTIALDIAGNOSIS/MDM:   Vitals:    Vitals:    08/01/24 2019 08/02/24 0015   BP: (!) 156/96 (!) 151/89   Pulse: (!) 112 77   Resp: 18 18   Temp: 98.3 °F (36.8 °C)    SpO2: 96% 98%   Weight: 77.1 kg (170 lb)            MDM  Number of Diagnoses or Management Options  Abscess  Diagnosis management comments: Vignesh Gale is a 59 y.o. male who presents to the emergency department with an abscess to his back x 4 days.  Went to urgent care and started on clindamycin.  Has gotten worse.  NO fever or vomiting.  HAs had a sebaceous cyst before that required draining      Abscess large and evaluated by Dr Peña.  I and D done and thick pus expressed from area closest to midline spine.  Lateral part of abscess did not yield any pus.  Believe that area is cellulitis.  Has not been on clindamycin very long.  Probably needs longer to work. Pt to do warm compresses and culture is pending.  Will continue clindamycin         Amount and/or Complexity of Data Reviewed  Tests in the medicine section of CPT®: ordered and reviewed  Discuss the patient with other providers: yes               CONSULTS:  None    PROCEDURES:  Unless otherwise noted below, none     Incision/Drainage    Date/Time: 8/1/2024 11:49 PM    Performed by: Sarai De La Torre APRN  Authorized by: Sarai De La Torre APRN    Consent:     Consent obtained:  Verbal    Consent given by:  Patient    Risks, benefits, and alternatives were discussed: yes      Risks discussed:  Pain and incomplete drainage  Universal protocol:     Procedure explained and questions answered to patient or proxy's satisfaction: yes      Patient identity confirmed:  Verbally with patient  Location:     Type:  Abscess    Size:  4    Location:  Trunk    Trunk location:  Back  Pre-procedure details:     Skin preparation:  Povidone-iodine  Sedation:     Sedation type:  None  Anesthesia:     Anesthesia method:  Local infiltration    Local anesthetic:

## 2024-08-04 LAB
BACTERIA SPEC ANAEROBE CULT: ABNORMAL
BACTERIA SPEC ANAEROBE+AEROBE CULT: ABNORMAL
GRAM STN SPEC: ABNORMAL
ORGANISM: ABNORMAL

## 2024-08-06 ENCOUNTER — OFFICE VISIT (OUTPATIENT)
Dept: INTERNAL MEDICINE | Age: 59
End: 2024-08-06
Payer: COMMERCIAL

## 2024-08-06 VITALS
HEART RATE: 91 BPM | DIASTOLIC BLOOD PRESSURE: 78 MMHG | OXYGEN SATURATION: 98 % | TEMPERATURE: 97 F | SYSTOLIC BLOOD PRESSURE: 140 MMHG | WEIGHT: 171 LBS | BODY MASS INDEX: 20.81 KG/M2

## 2024-08-06 DIAGNOSIS — L02.212 CUTANEOUS ABSCESS OF BACK (ANY PART, EXCEPT BUTTOCK): Primary | ICD-10-CM

## 2024-08-06 DIAGNOSIS — B99.9 INFECTION: ICD-10-CM

## 2024-08-06 PROCEDURE — 96372 THER/PROPH/DIAG INJ SC/IM: CPT | Performed by: INTERNAL MEDICINE

## 2024-08-06 PROCEDURE — 3077F SYST BP >= 140 MM HG: CPT | Performed by: INTERNAL MEDICINE

## 2024-08-06 PROCEDURE — 99213 OFFICE O/P EST LOW 20 MIN: CPT | Performed by: INTERNAL MEDICINE

## 2024-08-06 PROCEDURE — 3078F DIAST BP <80 MM HG: CPT | Performed by: INTERNAL MEDICINE

## 2024-08-06 RX ORDER — CEFTRIAXONE 500 MG/1
500 INJECTION, POWDER, FOR SOLUTION INTRAMUSCULAR; INTRAVENOUS ONCE
Status: COMPLETED | OUTPATIENT
Start: 2024-08-06 | End: 2024-08-06

## 2024-08-06 RX ADMIN — CEFTRIAXONE 500 MG: 500 INJECTION, POWDER, FOR SOLUTION INTRAMUSCULAR; INTRAVENOUS at 09:51

## 2024-08-06 SDOH — ECONOMIC STABILITY: FOOD INSECURITY: WITHIN THE PAST 12 MONTHS, YOU WORRIED THAT YOUR FOOD WOULD RUN OUT BEFORE YOU GOT MONEY TO BUY MORE.: NEVER TRUE

## 2024-08-06 SDOH — ECONOMIC STABILITY: FOOD INSECURITY: WITHIN THE PAST 12 MONTHS, THE FOOD YOU BOUGHT JUST DIDN'T LAST AND YOU DIDN'T HAVE MONEY TO GET MORE.: NEVER TRUE

## 2024-08-06 SDOH — ECONOMIC STABILITY: INCOME INSECURITY: HOW HARD IS IT FOR YOU TO PAY FOR THE VERY BASICS LIKE FOOD, HOUSING, MEDICAL CARE, AND HEATING?: NOT VERY HARD

## 2024-08-06 ASSESSMENT — PATIENT HEALTH QUESTIONNAIRE - PHQ9
SUM OF ALL RESPONSES TO PHQ QUESTIONS 1-9: 0
3. TROUBLE FALLING OR STAYING ASLEEP: NOT AT ALL
8. MOVING OR SPEAKING SO SLOWLY THAT OTHER PEOPLE COULD HAVE NOTICED. OR THE OPPOSITE, BEING SO FIGETY OR RESTLESS THAT YOU HAVE BEEN MOVING AROUND A LOT MORE THAN USUAL: NOT AT ALL
SUM OF ALL RESPONSES TO PHQ QUESTIONS 1-9: 0
1. LITTLE INTEREST OR PLEASURE IN DOING THINGS: NOT AT ALL
5. POOR APPETITE OR OVEREATING: NOT AT ALL
2. FEELING DOWN, DEPRESSED OR HOPELESS: NOT AT ALL
7. TROUBLE CONCENTRATING ON THINGS, SUCH AS READING THE NEWSPAPER OR WATCHING TELEVISION: NOT AT ALL
4. FEELING TIRED OR HAVING LITTLE ENERGY: NOT AT ALL
SUM OF ALL RESPONSES TO PHQ QUESTIONS 1-9: 0
9. THOUGHTS THAT YOU WOULD BE BETTER OFF DEAD, OR OF HURTING YOURSELF: NOT AT ALL
SUM OF ALL RESPONSES TO PHQ9 QUESTIONS 1 & 2: 0
6. FEELING BAD ABOUT YOURSELF - OR THAT YOU ARE A FAILURE OR HAVE LET YOURSELF OR YOUR FAMILY DOWN: NOT AT ALL
SUM OF ALL RESPONSES TO PHQ QUESTIONS 1-9: 0

## 2024-08-06 NOTE — PROGRESS NOTES
by mouth daily 90 tablet 0    cloNIDine (CATAPRES) 0.1 MG tablet TAKE 1 TABLET BY MOUTH EVERY 6 HOURS AS NEEDED FOR SYSTOLIC BLOOD PRESSURE GREATER THAN 180 AND OR DIASTOLIC BLOOD PRESSURE GREATER THAN 95 30 tablet 0    lamoTRIgine (LAMICTAL) 200 MG tablet TAKE 1 TABLET BY MOUTH DAILY 90 tablet 3    folic acid (FOLVITE) 400 MCG tablet Take 1 tablet by mouth daily      Multiple Vitamins-Minerals (MULTIVITAMIN ADULT PO) Take 1 tablet by mouth daily        No current facility-administered medications for this visit.       Review of Systems    BP (!) 140/78   Pulse 91   Temp 97 °F (36.1 °C)   Wt 77.6 kg (171 lb)   SpO2 98%   BMI 20.81 kg/m²   BP Readings from Last 7 Encounters:   08/06/24 (!) 140/78   08/02/24 (!) 151/89   07/31/24 (!) 150/88   04/15/24 124/72   01/15/24 (!) 142/84   07/11/23 120/80   03/02/23 134/88     Wt Readings from Last 7 Encounters:   08/06/24 77.6 kg (171 lb)   08/01/24 77.1 kg (170 lb)   07/31/24 77.6 kg (171 lb)   04/15/24 79.4 kg (175 lb)   01/15/24 80.7 kg (178 lb)   07/11/23 79.4 kg (175 lb)   03/02/23 79.8 kg (176 lb)     BMI Readings from Last 7 Encounters:   08/06/24 20.81 kg/m²   08/01/24 20.69 kg/m²   07/31/24 20.81 kg/m²   04/15/24 21.30 kg/m²   01/15/24 21.67 kg/m²   07/11/23 21.30 kg/m²   03/02/23 21.42 kg/m²     Resp Readings from Last 7 Encounters:   08/02/24 18   10/01/19 20   09/27/19 18   03/14/19 16   09/01/18 18   11/09/17 18   06/17/17 20       Physical Exam  Constitutional:       General: He is not in acute distress.  Eyes:      General: No scleral icterus.  Cardiovascular:      Heart sounds: Normal heart sounds.   Pulmonary:      Breath sounds: Normal breath sounds.   Musculoskeletal:      Cervical back: Neck supple.   Lymphadenopathy:      Cervical: No cervical adenopathy.   Skin:     Findings: No rash.      Comments: Lesion on back or abscess was I&D mild erythema but improving.   Psychiatric:         Mood and Affect: Mood normal.         Results for orders placed

## 2024-09-04 ENCOUNTER — TELEPHONE (OUTPATIENT)
Dept: INTERNAL MEDICINE | Age: 59
End: 2024-09-04

## 2024-09-04 ENCOUNTER — OFFICE VISIT (OUTPATIENT)
Dept: INTERNAL MEDICINE | Age: 59
End: 2024-09-04
Payer: COMMERCIAL

## 2024-09-04 VITALS
WEIGHT: 169 LBS | TEMPERATURE: 98.9 F | DIASTOLIC BLOOD PRESSURE: 70 MMHG | OXYGEN SATURATION: 97 % | HEART RATE: 93 BPM | SYSTOLIC BLOOD PRESSURE: 120 MMHG | BODY MASS INDEX: 20.57 KG/M2

## 2024-09-04 DIAGNOSIS — H01.006 BLEPHARITIS OF BOTH EYES, UNSPECIFIED EYELID, UNSPECIFIED TYPE: Primary | ICD-10-CM

## 2024-09-04 DIAGNOSIS — H01.003 BLEPHARITIS OF BOTH EYES, UNSPECIFIED EYELID, UNSPECIFIED TYPE: Primary | ICD-10-CM

## 2024-09-04 PROCEDURE — 99213 OFFICE O/P EST LOW 20 MIN: CPT | Performed by: NURSE PRACTITIONER

## 2024-09-04 PROCEDURE — 3074F SYST BP LT 130 MM HG: CPT | Performed by: NURSE PRACTITIONER

## 2024-09-04 PROCEDURE — 3078F DIAST BP <80 MM HG: CPT | Performed by: NURSE PRACTITIONER

## 2024-09-04 RX ORDER — ERYTHROMYCIN 5 MG/G
OINTMENT OPHTHALMIC
Qty: 3.5 G | Refills: 0 | Status: SHIPPED | OUTPATIENT
Start: 2024-09-04 | End: 2024-09-14

## 2024-09-04 ASSESSMENT — ENCOUNTER SYMPTOMS
EYE ITCHING: 1
EYE DISCHARGE: 1

## 2024-09-04 NOTE — PROGRESS NOTES
benefit, and side effects of prescribedmedications.  All patient questions answered.  Pt voiced understanding.     There are no Patient Instructions on file for this visit.      Electronically signed by GUILLAUME Ellison on 9/4/2024 at 3:48 PM    EMR Dragon/transcription disclaimer:  Much of this encounter note is electronic transcription/translation of spoken language to printed texts.  The electronic translation of spoken language may be erroneous, or at times, nonsensical words or phrases may be inadvertently transcribed.  Although I have reviewed the note for such errors, some may still exist.

## 2024-09-04 NOTE — TELEPHONE ENCOUNTER
Patient called stated that he was on vacation took airplane home, and the air was really dry in place he visited. He said his eyelid is sagging/ heavy feeling. No vision issues, no headaches he stated, was wondering if this is just from airplane, or if he needed to come in, or if he needed eyedrops to help. Please advise.

## 2025-02-05 ENCOUNTER — APPOINTMENT (OUTPATIENT)
Dept: CT IMAGING | Age: 60
End: 2025-02-05
Payer: COMMERCIAL

## 2025-02-05 ENCOUNTER — HOSPITAL ENCOUNTER (EMERGENCY)
Age: 60
Discharge: HOME | End: 2025-02-05
Payer: COMMERCIAL

## 2025-02-05 VITALS
OXYGEN SATURATION: 100 % | HEART RATE: 83 BPM | WEIGHT: 175 LBS | BODY MASS INDEX: 21.31 KG/M2 | TEMPERATURE: 98.1 F | RESPIRATION RATE: 18 BRPM | DIASTOLIC BLOOD PRESSURE: 68 MMHG | SYSTOLIC BLOOD PRESSURE: 119 MMHG | HEIGHT: 76 IN

## 2025-02-05 DIAGNOSIS — M54.50 BILATERAL LOW BACK PAIN, UNSPECIFIED CHRONICITY, UNSPECIFIED WHETHER SCIATICA PRESENT: Primary | ICD-10-CM

## 2025-02-05 LAB
ALBUMIN SERPL-MCNC: 4.1 G/DL (ref 3.5–5.2)
ALP SERPL-CCNC: 87 U/L (ref 40–129)
ALT SERPL-CCNC: 12 U/L (ref 5–41)
ANION GAP SERPL CALCULATED.3IONS-SCNC: 10 MMOL/L (ref 8–16)
AST SERPL-CCNC: 25 U/L (ref 5–40)
BACTERIA URNS QL MICRO: NEGATIVE /HPF
BASOPHILS # BLD: 0 K/UL (ref 0–0.2)
BASOPHILS NFR BLD: 0.3 % (ref 0–1)
BILIRUB SERPL-MCNC: 0.5 MG/DL (ref 0.2–1.2)
BILIRUB UR QL STRIP: NEGATIVE
BUN SERPL-MCNC: 7 MG/DL (ref 8–23)
CALCIUM SERPL-MCNC: 9.5 MG/DL (ref 8.8–10.2)
CHLORIDE SERPL-SCNC: 100 MMOL/L (ref 98–107)
CLARITY UR: CLEAR
CO2 SERPL-SCNC: 29 MMOL/L (ref 22–29)
COLOR UR: YELLOW
CREAT SERPL-MCNC: 0.8 MG/DL (ref 0.7–1.2)
CRYSTALS URNS MICRO: NORMAL /HPF
EOSINOPHIL # BLD: 0.1 K/UL (ref 0–0.6)
EOSINOPHIL NFR BLD: 1 % (ref 0–5)
EPI CELLS #/AREA URNS AUTO: 0 /HPF (ref 0–5)
ERYTHROCYTE [DISTWIDTH] IN BLOOD BY AUTOMATED COUNT: 12.2 % (ref 11.5–14.5)
GLUCOSE SERPL-MCNC: 105 MG/DL (ref 70–99)
GLUCOSE UR STRIP.AUTO-MCNC: NEGATIVE MG/DL
HCT VFR BLD AUTO: 41.2 % (ref 42–52)
HGB BLD-MCNC: 13.9 G/DL (ref 14–18)
HGB UR STRIP.AUTO-MCNC: NEGATIVE MG/L
HYALINE CASTS #/AREA URNS AUTO: 0 /HPF (ref 0–8)
IMM GRANULOCYTES # BLD: 0 K/UL
KETONES UR STRIP.AUTO-MCNC: NEGATIVE MG/DL
LEUKOCYTE ESTERASE UR QL STRIP.AUTO: ABNORMAL
LYMPHOCYTES # BLD: 0.8 K/UL (ref 1.1–4.5)
LYMPHOCYTES NFR BLD: 11.4 % (ref 20–40)
MCH RBC QN AUTO: 32.9 PG (ref 27–31)
MCHC RBC AUTO-ENTMCNC: 33.7 G/DL (ref 33–37)
MCV RBC AUTO: 97.4 FL (ref 80–94)
MONOCYTES # BLD: 0.7 K/UL (ref 0–0.9)
MONOCYTES NFR BLD: 10.2 % (ref 0–10)
NEUTROPHILS # BLD: 5.3 K/UL (ref 1.5–7.5)
NEUTS SEG NFR BLD: 76.8 % (ref 50–65)
NITRITE UR QL STRIP.AUTO: NEGATIVE
PH UR STRIP.AUTO: 8 [PH] (ref 5–8)
PLATELET # BLD AUTO: 226 K/UL (ref 130–400)
PMV BLD AUTO: 9.4 FL (ref 9.4–12.4)
POTASSIUM SERPL-SCNC: 4 MMOL/L (ref 3.5–5.1)
PROT SERPL-MCNC: 6.3 G/DL (ref 6.4–8.3)
PROT UR STRIP.AUTO-MCNC: NEGATIVE MG/DL
RBC # BLD AUTO: 4.23 M/UL (ref 4.7–6.1)
RBC #/AREA URNS AUTO: 0 /HPF (ref 0–4)
SODIUM SERPL-SCNC: 139 MMOL/L (ref 136–145)
SP GR UR STRIP.AUTO: 1.01 (ref 1–1.03)
UROBILINOGEN UR STRIP.AUTO-MCNC: 0.2 E.U./DL
WBC # BLD AUTO: 6.9 K/UL (ref 4.8–10.8)
WBC #/AREA URNS AUTO: 5 /HPF (ref 0–5)

## 2025-02-05 PROCEDURE — 72128 CT CHEST SPINE W/O DYE: CPT

## 2025-02-05 PROCEDURE — 6360000004 HC RX CONTRAST MEDICATION: Performed by: NURSE PRACTITIONER

## 2025-02-05 PROCEDURE — 6360000002 HC RX W HCPCS: Performed by: NURSE PRACTITIONER

## 2025-02-05 PROCEDURE — 85025 COMPLETE CBC W/AUTO DIFF WBC: CPT

## 2025-02-05 PROCEDURE — 80053 COMPREHEN METABOLIC PANEL: CPT

## 2025-02-05 PROCEDURE — 36415 COLL VENOUS BLD VENIPUNCTURE: CPT

## 2025-02-05 PROCEDURE — 99285 EMERGENCY DEPT VISIT HI MDM: CPT

## 2025-02-05 PROCEDURE — 96374 THER/PROPH/DIAG INJ IV PUSH: CPT

## 2025-02-05 PROCEDURE — 72131 CT LUMBAR SPINE W/O DYE: CPT

## 2025-02-05 PROCEDURE — 81001 URINALYSIS AUTO W/SCOPE: CPT

## 2025-02-05 PROCEDURE — 74177 CT ABD & PELVIS W/CONTRAST: CPT

## 2025-02-05 RX ORDER — KETOROLAC TROMETHAMINE 30 MG/ML
15 INJECTION, SOLUTION INTRAMUSCULAR; INTRAVENOUS ONCE
Status: COMPLETED | OUTPATIENT
Start: 2025-02-05 | End: 2025-02-05

## 2025-02-05 RX ORDER — IBUPROFEN 600 MG/1
600 TABLET, FILM COATED ORAL EVERY 6 HOURS PRN
Qty: 28 TABLET | Refills: 0 | Status: SHIPPED | OUTPATIENT
Start: 2025-02-05 | End: 2025-02-12

## 2025-02-05 RX ORDER — IOPAMIDOL 755 MG/ML
70 INJECTION, SOLUTION INTRAVASCULAR
Status: COMPLETED | OUTPATIENT
Start: 2025-02-05 | End: 2025-02-05

## 2025-02-05 RX ORDER — LIDOCAINE 50 MG/G
1 PATCH TOPICAL EVERY 24 HOURS
Qty: 30 PATCH | Refills: 0 | Status: SHIPPED | OUTPATIENT
Start: 2025-02-05 | End: 2025-03-07

## 2025-02-05 RX ADMIN — KETOROLAC TROMETHAMINE 15 MG: 30 INJECTION, SOLUTION INTRAMUSCULAR; INTRAVENOUS at 14:32

## 2025-02-05 RX ADMIN — IOPAMIDOL 70 ML: 755 INJECTION, SOLUTION INTRAVENOUS at 14:30

## 2025-02-05 ASSESSMENT — ENCOUNTER SYMPTOMS
VOMITING: 0
DIARRHEA: 0
ABDOMINAL PAIN: 0
NAUSEA: 0
SHORTNESS OF BREATH: 0
BACK PAIN: 1
COUGH: 0

## 2025-02-05 NOTE — ED PROVIDER NOTES
[facility] EMERGENCY DEPARTMENT  EMERGENCY DEPARTMENT ENCOUNTER      Pt Name: Vignesh Gale  MRN: [id number]  Birthdate 1965  Date of evaluation: 2/5/2025  Provider: GUILLAUME Ovalle CNP  4:53 PM    CHIEF COMPLAINT       Chief Complaint   Patient presents with    Flank Pain     Bilateral flank pain, worse on right side since yesterday.         HISTORY OF PRESENT ILLNESS    Vignesh Gale is a 59 y.o. male who presents to the emergency department with concern for flank pain on right since yesterday. Felt like he had \"old man back pain\" but it gradually got worse through today. No fever or chills. No n/v/d. No abd pain. No urinary symptoms. No discharge. No similar previous. No numbness, weakness, tingling. No saddle anesthesia. No no bowel or bladder dysfunction.     HPI    Nursing Notes were reviewed.    REVIEW OF SYSTEMS       Review of Systems   Constitutional:  Negative for chills and fever.   HENT:  Negative for congestion.    Respiratory:  Negative for cough and shortness of breath.    Cardiovascular:  Negative for chest pain and palpitations.   Gastrointestinal:  Negative for abdominal pain, diarrhea, nausea and vomiting.   Genitourinary:  Positive for flank pain. Negative for dysuria and urgency.   Musculoskeletal:  Positive for back pain. Negative for myalgias and neck pain.   Neurological:  Negative for dizziness, syncope, weakness, light-headedness and headaches.             PAST MEDICAL HISTORY     Past Medical History:   Diagnosis Date    Arthritis     Bipolar disorder (HCC)     BPH (benign prostatic hypertrophy)     Constipation     Depression     GERD (gastroesophageal reflux disease)     Headache(784.0)     History of blood transfusion     Hypertension     Pelvic floor dysfunction     Pelvic floor dysfunction          SURGICAL HISTORY       Past Surgical History:   Procedure Laterality Date    ACHILLES TENDON SURGERY      CHOLECYSTECTOMY      COLONOSCOPY N/A 3/14/2019    Dr VARNER

## 2025-02-07 ENCOUNTER — TELEPHONE (OUTPATIENT)
Dept: INTERNAL MEDICINE | Age: 60
End: 2025-02-07

## 2025-02-07 DIAGNOSIS — D21.21: ICD-10-CM

## 2025-02-07 DIAGNOSIS — M54.40 ACUTE BILATERAL LOW BACK PAIN WITH SCIATICA, SCIATICA LATERALITY UNSPECIFIED: Primary | ICD-10-CM

## 2025-02-07 DIAGNOSIS — D21.9 MYXOMA: ICD-10-CM

## 2025-02-07 NOTE — TELEPHONE ENCOUNTER
He needs a f/u with me- is CTs at ER looked like possible benign appearing nerve lesions in right thigh and buttock area but need be sure and possible tiny borderline aneurysm that would just follow for now-- I ordered MRIs and make him an appt several weeks so mri done and can review

## 2025-02-10 DIAGNOSIS — M54.41 ACUTE RIGHT-SIDED LOW BACK PAIN WITH RIGHT-SIDED SCIATICA: Primary | ICD-10-CM

## 2025-02-10 DIAGNOSIS — D21.21 MYXOMA OF THIGH, RIGHT: ICD-10-CM

## 2025-02-17 ENCOUNTER — TELEPHONE (OUTPATIENT)
Dept: INTERNAL MEDICINE | Age: 60
End: 2025-02-17

## 2025-02-17 NOTE — TELEPHONE ENCOUNTER
[facility] called about the MRI's.  They said that they could get all the area with just the Pelvic MRI and do not need the femur MRI unless you were looking for something lower down on the femur?   They did not know if he could lay there long enough for both also.  OK to cancel the femur?

## 2025-03-02 ASSESSMENT — PATIENT HEALTH QUESTIONNAIRE - PHQ9
SUM OF ALL RESPONSES TO PHQ QUESTIONS 1-9: 1
6. FEELING BAD ABOUT YOURSELF - OR THAT YOU ARE A FAILURE OR HAVE LET YOURSELF OR YOUR FAMILY DOWN: NOT AT ALL
2. FEELING DOWN, DEPRESSED OR HOPELESS: NOT AT ALL
1. LITTLE INTEREST OR PLEASURE IN DOING THINGS: NOT AT ALL
5. POOR APPETITE OR OVEREATING: NOT AT ALL
5. POOR APPETITE OR OVEREATING: NOT AT ALL
SUM OF ALL RESPONSES TO PHQ QUESTIONS 1-9: 1
SUM OF ALL RESPONSES TO PHQ QUESTIONS 1-9: 1
6. FEELING BAD ABOUT YOURSELF - OR THAT YOU ARE A FAILURE OR HAVE LET YOURSELF OR YOUR FAMILY DOWN: NOT AT ALL
4. FEELING TIRED OR HAVING LITTLE ENERGY: SEVERAL DAYS
3. TROUBLE FALLING OR STAYING ASLEEP: NOT AT ALL
SUM OF ALL RESPONSES TO PHQ QUESTIONS 1-9: 1
3. TROUBLE FALLING OR STAYING ASLEEP: NOT AT ALL
1. LITTLE INTEREST OR PLEASURE IN DOING THINGS: NOT AT ALL
8. MOVING OR SPEAKING SO SLOWLY THAT OTHER PEOPLE COULD HAVE NOTICED. OR THE OPPOSITE, BEING SO FIGETY OR RESTLESS THAT YOU HAVE BEEN MOVING AROUND A LOT MORE THAN USUAL: NOT AT ALL
SUM OF ALL RESPONSES TO PHQ QUESTIONS 1-9: 1
10. IF YOU CHECKED OFF ANY PROBLEMS, HOW DIFFICULT HAVE THESE PROBLEMS MADE IT FOR YOU TO DO YOUR WORK, TAKE CARE OF THINGS AT HOME, OR GET ALONG WITH OTHER PEOPLE: NOT DIFFICULT AT ALL
8. MOVING OR SPEAKING SO SLOWLY THAT OTHER PEOPLE COULD HAVE NOTICED. OR THE OPPOSITE - BEING SO FIDGETY OR RESTLESS THAT YOU HAVE BEEN MOVING AROUND A LOT MORE THAN USUAL: NOT AT ALL
2. FEELING DOWN, DEPRESSED OR HOPELESS: NOT AT ALL
7. TROUBLE CONCENTRATING ON THINGS, SUCH AS READING THE NEWSPAPER OR WATCHING TELEVISION: NOT AT ALL
7. TROUBLE CONCENTRATING ON THINGS, SUCH AS READING THE NEWSPAPER OR WATCHING TELEVISION: NOT AT ALL
9. THOUGHTS THAT YOU WOULD BE BETTER OFF DEAD, OR OF HURTING YOURSELF: NOT AT ALL
9. THOUGHTS THAT YOU WOULD BE BETTER OFF DEAD, OR OF HURTING YOURSELF: NOT AT ALL
4. FEELING TIRED OR HAVING LITTLE ENERGY: SEVERAL DAYS
10. IF YOU CHECKED OFF ANY PROBLEMS, HOW DIFFICULT HAVE THESE PROBLEMS MADE IT FOR YOU TO DO YOUR WORK, TAKE CARE OF THINGS AT HOME, OR GET ALONG WITH OTHER PEOPLE: NOT DIFFICULT AT ALL

## 2025-03-03 ENCOUNTER — OFFICE VISIT (OUTPATIENT)
Dept: INTERNAL MEDICINE | Age: 60
End: 2025-03-03
Payer: COMMERCIAL

## 2025-03-03 VITALS
SYSTOLIC BLOOD PRESSURE: 122 MMHG | WEIGHT: 171 LBS | HEIGHT: 76 IN | OXYGEN SATURATION: 99 % | HEART RATE: 98 BPM | DIASTOLIC BLOOD PRESSURE: 80 MMHG | BODY MASS INDEX: 20.82 KG/M2

## 2025-03-03 DIAGNOSIS — D21.21: Primary | ICD-10-CM

## 2025-03-03 DIAGNOSIS — Z12.5 SCREENING FOR PROSTATE CANCER: ICD-10-CM

## 2025-03-03 DIAGNOSIS — I10 ESSENTIAL HYPERTENSION: Chronic | ICD-10-CM

## 2025-03-03 DIAGNOSIS — M62.89 PELVIC FLOOR DYSFUNCTION: Chronic | ICD-10-CM

## 2025-03-03 DIAGNOSIS — D21.21: ICD-10-CM

## 2025-03-03 DIAGNOSIS — M62.89 MUSCLE MASS: Primary | ICD-10-CM

## 2025-03-03 LAB
25(OH)D3 SERPL-MCNC: 87.4 NG/ML
ALBUMIN SERPL-MCNC: 4.5 G/DL (ref 3.5–5.2)
ALP SERPL-CCNC: 85 U/L (ref 40–129)
ALT SERPL-CCNC: 17 U/L (ref 5–41)
ANION GAP SERPL CALCULATED.3IONS-SCNC: 15 MMOL/L (ref 8–16)
AST SERPL-CCNC: 31 U/L (ref 5–40)
BASOPHILS # BLD: 0 K/UL (ref 0–0.2)
BASOPHILS NFR BLD: 0.4 % (ref 0–1)
BILIRUB SERPL-MCNC: 0.4 MG/DL (ref 0.2–1.2)
BUN SERPL-MCNC: 9 MG/DL (ref 8–23)
CALCIUM SERPL-MCNC: 10 MG/DL (ref 8.8–10.2)
CHLORIDE SERPL-SCNC: 99 MMOL/L (ref 98–107)
CO2 SERPL-SCNC: 27 MMOL/L (ref 22–29)
CREAT SERPL-MCNC: 1 MG/DL (ref 0.7–1.2)
CRP SERPL HS-MCNC: 0.46 MG/DL (ref 0–0.5)
EOSINOPHIL # BLD: 0 K/UL (ref 0–0.6)
EOSINOPHIL NFR BLD: 0.4 % (ref 0–5)
ERYTHROCYTE [DISTWIDTH] IN BLOOD BY AUTOMATED COUNT: 12.5 % (ref 11.5–14.5)
ERYTHROCYTE [SEDIMENTATION RATE] IN BLOOD BY WESTERGREN METHOD: 10 MM/HR (ref 0–15)
GLUCOSE SERPL-MCNC: 112 MG/DL (ref 70–99)
HCT VFR BLD AUTO: 43.1 % (ref 42–52)
HGB BLD-MCNC: 14.1 G/DL (ref 14–18)
IMM GRANULOCYTES # BLD: 0 K/UL
LYMPHOCYTES # BLD: 1.4 K/UL (ref 1.1–4.5)
LYMPHOCYTES NFR BLD: 17.5 % (ref 20–40)
MCH RBC QN AUTO: 33.1 PG (ref 27–31)
MCHC RBC AUTO-ENTMCNC: 32.7 G/DL (ref 33–37)
MCV RBC AUTO: 101.2 FL (ref 80–94)
MONOCYTES # BLD: 0.9 K/UL (ref 0–0.9)
MONOCYTES NFR BLD: 10.9 % (ref 0–10)
NEUTROPHILS # BLD: 5.6 K/UL (ref 1.5–7.5)
NEUTS SEG NFR BLD: 70.5 % (ref 50–65)
PLATELET # BLD AUTO: 247 K/UL (ref 130–400)
PMV BLD AUTO: 9.9 FL (ref 9.4–12.4)
POTASSIUM SERPL-SCNC: 4.5 MMOL/L (ref 3.5–5.1)
PROT SERPL-MCNC: 7.2 G/DL (ref 6.4–8.3)
PSA SERPL-MCNC: 2.09 NG/ML (ref 0–4)
RBC # BLD AUTO: 4.26 M/UL (ref 4.7–6.1)
SODIUM SERPL-SCNC: 141 MMOL/L (ref 136–145)
WBC # BLD AUTO: 7.9 K/UL (ref 4.8–10.8)

## 2025-03-03 PROCEDURE — 3079F DIAST BP 80-89 MM HG: CPT | Performed by: INTERNAL MEDICINE

## 2025-03-03 PROCEDURE — 99214 OFFICE O/P EST MOD 30 MIN: CPT | Performed by: INTERNAL MEDICINE

## 2025-03-03 PROCEDURE — 3074F SYST BP LT 130 MM HG: CPT | Performed by: INTERNAL MEDICINE

## 2025-03-03 SDOH — ECONOMIC STABILITY: FOOD INSECURITY: WITHIN THE PAST 12 MONTHS, YOU WORRIED THAT YOUR FOOD WOULD RUN OUT BEFORE YOU GOT MONEY TO BUY MORE.: NEVER TRUE

## 2025-03-03 SDOH — ECONOMIC STABILITY: FOOD INSECURITY: WITHIN THE PAST 12 MONTHS, THE FOOD YOU BOUGHT JUST DIDN'T LAST AND YOU DIDN'T HAVE MONEY TO GET MORE.: NEVER TRUE

## 2025-03-18 PROBLEM — D21.21: Status: ACTIVE | Noted: 2025-03-18

## 2025-03-19 NOTE — PROGRESS NOTES
mouth every morning 30 tablet 3    tadalafil (CIALIS) 5 MG tablet Take 1 tablet by mouth daily 90 tablet 1    Dextromethorphan HBr 15 MG TABS Take 30 mg by mouth daily 90 tablet 0    cloNIDine (CATAPRES) 0.1 MG tablet TAKE 1 TABLET BY MOUTH EVERY 6 HOURS AS NEEDED FOR SYSTOLIC BLOOD PRESSURE GREATER THAN 180 AND OR DIASTOLIC BLOOD PRESSURE GREATER THAN 95 30 tablet 0    lamoTRIgine (LAMICTAL) 200 MG tablet TAKE 1 TABLET BY MOUTH DAILY 90 tablet 3    folic acid (FOLVITE) 400 MCG tablet Take 1 tablet by mouth daily      Multiple Vitamins-Minerals (MULTIVITAMIN ADULT PO) Take 1 tablet by mouth daily        No current facility-administered medications for this visit.       Review of Systems    /80   Pulse 98   Ht 1.93 m (6' 4\")   Wt 77.6 kg (171 lb)   SpO2 99%   BMI 20.81 kg/m²   BP Readings from Last 7 Encounters:   03/03/25 122/80   02/05/25 119/68   09/04/24 120/70   08/06/24 (!) 140/78   08/02/24 (!) 151/89   07/31/24 (!) 150/88   04/15/24 124/72     Wt Readings from Last 7 Encounters:   03/03/25 77.6 kg (171 lb)   02/05/25 79.4 kg (175 lb)   09/04/24 76.7 kg (169 lb)   08/06/24 77.6 kg (171 lb)   08/01/24 77.1 kg (170 lb)   07/31/24 77.6 kg (171 lb)   04/15/24 79.4 kg (175 lb)     BMI Readings from Last 7 Encounters:   03/03/25 20.81 kg/m²   02/05/25 21.30 kg/m²   09/04/24 20.57 kg/m²   08/06/24 20.81 kg/m²   08/01/24 20.69 kg/m²   07/31/24 20.81 kg/m²   04/15/24 21.30 kg/m²     Resp Readings from Last 7 Encounters:   02/05/25 18   08/02/24 18   10/01/19 20   09/27/19 18   03/14/19 16   09/01/18 18   11/09/17 18       Physical Exam  Constitutional:       General: He is not in acute distress.  Eyes:      General: No scleral icterus.  Cardiovascular:      Heart sounds: Normal heart sounds.   Pulmonary:      Breath sounds: Normal breath sounds.   Musculoskeletal:      Cervical back: Neck supple.      Comments: Ok rom hips.  Some pain left hip rom.    Lymphadenopathy:      Cervical: No cervical adenopathy.

## 2025-04-21 ENCOUNTER — OFFICE VISIT (OUTPATIENT)
Dept: INTERNAL MEDICINE | Age: 60
End: 2025-04-21
Payer: COMMERCIAL

## 2025-04-21 VITALS
SYSTOLIC BLOOD PRESSURE: 118 MMHG | BODY MASS INDEX: 20.58 KG/M2 | HEART RATE: 88 BPM | WEIGHT: 169 LBS | OXYGEN SATURATION: 97 % | HEIGHT: 76 IN | DIASTOLIC BLOOD PRESSURE: 78 MMHG

## 2025-04-21 DIAGNOSIS — Z87.891 PERSONAL HISTORY OF TOBACCO USE: ICD-10-CM

## 2025-04-21 DIAGNOSIS — I10 ESSENTIAL HYPERTENSION: ICD-10-CM

## 2025-04-21 DIAGNOSIS — L72.3 SEBACEOUS CYST: ICD-10-CM

## 2025-04-21 DIAGNOSIS — M70.61 TROCHANTERIC BURSITIS OF BOTH HIPS: ICD-10-CM

## 2025-04-21 DIAGNOSIS — Z13.21 ENCOUNTER FOR VITAMIN DEFICIENCY SCREENING: ICD-10-CM

## 2025-04-21 DIAGNOSIS — Z12.5 SCREENING FOR PROSTATE CANCER: ICD-10-CM

## 2025-04-21 DIAGNOSIS — R73.9 HYPERGLYCEMIA: ICD-10-CM

## 2025-04-21 DIAGNOSIS — M70.62 TROCHANTERIC BURSITIS OF BOTH HIPS: ICD-10-CM

## 2025-04-21 DIAGNOSIS — M76.31 ILIOTIBIAL BAND SYNDROME OF RIGHT SIDE: ICD-10-CM

## 2025-04-21 DIAGNOSIS — D21.9 MYXOMA: Primary | ICD-10-CM

## 2025-04-21 DIAGNOSIS — M76.32 ILIOTIBIAL BAND SYNDROME OF LEFT SIDE: ICD-10-CM

## 2025-04-21 PROCEDURE — 3078F DIAST BP <80 MM HG: CPT | Performed by: INTERNAL MEDICINE

## 2025-04-21 PROCEDURE — 99214 OFFICE O/P EST MOD 30 MIN: CPT | Performed by: INTERNAL MEDICINE

## 2025-04-21 PROCEDURE — G0296 VISIT TO DETERM LDCT ELIG: HCPCS | Performed by: INTERNAL MEDICINE

## 2025-04-21 PROCEDURE — 3074F SYST BP LT 130 MM HG: CPT | Performed by: INTERNAL MEDICINE

## 2025-04-21 RX ORDER — MELOXICAM 15 MG/1
15 TABLET ORAL DAILY PRN
Qty: 30 TABLET | Refills: 0 | Status: SHIPPED | OUTPATIENT
Start: 2025-04-21

## 2025-04-21 RX ORDER — MELOXICAM 15 MG/1
15 TABLET ORAL DAILY PRN
Qty: 30 TABLET | Refills: 0 | Status: SHIPPED | OUTPATIENT
Start: 2025-04-21 | End: 2025-04-21 | Stop reason: SDUPTHER

## 2025-04-21 RX ORDER — DIAZEPAM 10 MG/1
15 TABLET ORAL DAILY
COMMUNITY

## 2025-04-21 NOTE — PROGRESS NOTES
Chief Complaint   Patient presents with    1 Month Follow-Up     Myxoma of right thigh        HPI: Patient is here today to follow-up recent finding of a myxoma of the right thigh.  He presented to the ER with severe flank lower back pelvic type pain workup revealed a mass he referred him to Ortho at [place] he had a friend orthopedist at [facility] got in there quicker was then referred to Ortho oncology I reviewed the records they are the physician does not feel that this is a cancer feels it is benign recommended follow-up but really did not give guidance on that.  Patient is doing some therapy still having discomfort but manageable    Past Medical History:   Diagnosis Date    Arthritis     Bipolar disorder (HCC)     BPH (benign prostatic hypertrophy)     Constipation     Depression     GERD (gastroesophageal reflux disease)     Headache(784.0)     History of blood transfusion     Hypertension     Pelvic floor dysfunction     Pelvic floor dysfunction        Past Surgical History:   Procedure Laterality Date    ACHILLES TENDON SURGERY      CHOLECYSTECTOMY      COLONOSCOPY N/A 3/14/2019    Dr GARDENIA Stratton-Diverticular disease-5 yr recall    ENDOSCOPY, COLON, DIAGNOSTIC      JOINT REPLACEMENT      left ankle replacement    TONSILLECTOMY AND ADENOIDECTOMY         Family History   Problem Relation Age of Onset    Colon Cancer Neg Hx     Colon Polyps Neg Hx     Liver Cancer Neg Hx     Liver Disease Neg Hx     Esophageal Cancer Neg Hx     Stomach Cancer Neg Hx     Rectal Cancer Neg Hx        Social History     Socioeconomic History    Marital status:      Spouse name: Not on file    Number of children: Not on file    Years of education: Not on file    Highest education level: Not on file   Occupational History    Not on file   Tobacco Use    Smoking status: Every Day     Current packs/day: 0.25     Average packs/day: 0.3 packs/day for 30.0 years (7.5 ttl pk-yrs)     Types: Cigarettes    Smokeless tobacco: Never

## 2025-04-30 RX ORDER — RABEPRAZOLE SODIUM 20 MG/1
20 TABLET, DELAYED RELEASE ORAL DAILY
Qty: 90 TABLET | Refills: 3 | Status: SHIPPED | OUTPATIENT
Start: 2025-04-30

## 2025-05-02 ENCOUNTER — RESULTS FOLLOW-UP (OUTPATIENT)
Dept: INTERNAL MEDICINE | Age: 60
End: 2025-05-02

## 2025-05-05 DIAGNOSIS — M70.62 TROCHANTERIC BURSITIS OF BOTH HIPS: ICD-10-CM

## 2025-05-05 DIAGNOSIS — M70.61 TROCHANTERIC BURSITIS OF BOTH HIPS: ICD-10-CM

## 2025-05-05 DIAGNOSIS — M76.31 ILIOTIBIAL BAND SYNDROME OF RIGHT SIDE: ICD-10-CM

## 2025-05-05 DIAGNOSIS — M76.32 ILIOTIBIAL BAND SYNDROME OF LEFT SIDE: ICD-10-CM

## 2025-05-05 RX ORDER — MELOXICAM 15 MG/1
15 TABLET ORAL DAILY PRN
Qty: 90 TABLET | Refills: 0 | Status: SHIPPED | OUTPATIENT
Start: 2025-05-05

## 2025-05-29 ENCOUNTER — OFFICE VISIT (OUTPATIENT)
Dept: INTERNAL MEDICINE | Age: 60
End: 2025-05-29
Payer: COMMERCIAL

## 2025-05-29 VITALS
WEIGHT: 178 LBS | HEART RATE: 81 BPM | SYSTOLIC BLOOD PRESSURE: 130 MMHG | HEIGHT: 76 IN | DIASTOLIC BLOOD PRESSURE: 82 MMHG | OXYGEN SATURATION: 97 % | BODY MASS INDEX: 21.68 KG/M2

## 2025-05-29 DIAGNOSIS — S81.802A WOUND OF LEFT LOWER EXTREMITY, INITIAL ENCOUNTER: ICD-10-CM

## 2025-05-29 DIAGNOSIS — S81.812A LACERATION OF LEFT LOWER EXTREMITY, INITIAL ENCOUNTER: Primary | ICD-10-CM

## 2025-05-29 PROCEDURE — 99213 OFFICE O/P EST LOW 20 MIN: CPT | Performed by: INTERNAL MEDICINE

## 2025-05-29 PROCEDURE — 3079F DIAST BP 80-89 MM HG: CPT | Performed by: INTERNAL MEDICINE

## 2025-05-29 PROCEDURE — 3075F SYST BP GE 130 - 139MM HG: CPT | Performed by: INTERNAL MEDICINE

## 2025-05-29 RX ORDER — MUPIROCIN 20 MG/G
OINTMENT TOPICAL
Qty: 22 G | Refills: 0 | Status: SHIPPED | OUTPATIENT
Start: 2025-05-29 | End: 2025-06-05

## 2025-05-29 ASSESSMENT — ENCOUNTER SYMPTOMS
CHEST TIGHTNESS: 0
COLOR CHANGE: 1
COUGH: 0
CONSTIPATION: 0
ABDOMINAL PAIN: 0
WHEEZING: 0
SORE THROAT: 0

## 2025-05-29 NOTE — PROGRESS NOTES
Chief Complaint   Patient presents with    Wound Check     Left leg       History of presenting illness:  Vignesh Gale is a59 y.o. male who presents today for follow up on his chronic medical conditions as noted below.    Patient Active Problem List    Diagnosis Date Noted    Bipolar 2 disorder ([facility]) 01/06/2017     Priority: High    Other male erectile dysfunction 05/10/2022     Priority: Medium    Myxoma of right thigh 03/18/2025    History of COVID-19 08/15/2021    SBO (small bowel obstruction) ([facility]) 09/30/2019    History of colonic diverticulitis 10/25/2018    Diverticulosis of large intestine without hemorrhage 10/25/2018    Essential hypertension 08/28/2018    Gastroesophageal reflux disease without esophagitis 08/28/2018    Acute diverticulitis     History of colon polyps 01/02/2018    Constipation     Pelvic floor dysfunction      Past Medical History:   Diagnosis Date    Arthritis     Bipolar disorder ([facility])     BPH (benign prostatic hypertrophy)     Constipation     Depression     GERD (gastroesophageal reflux disease)     Headache(784.0)     History of blood transfusion     Hypertension     Pelvic floor dysfunction     Pelvic floor dysfunction       Past Surgical History:   Procedure Laterality Date    ACHILLES TENDON SURGERY      CHOLECYSTECTOMY      COLONOSCOPY N/A 3/14/2019    Dr GARDENIA Stratton-Diverticular disease-5 yr recall    ENDOSCOPY, COLON, DIAGNOSTIC      JOINT REPLACEMENT      left ankle replacement    TONSILLECTOMY AND ADENOIDECTOMY       Current Outpatient Medications   Medication Sig Dispense Refill    amoxicillin-clavulanate (AUGMENTIN) 875-125 MG per tablet Take 1 tablet by mouth 2 times daily for 7 days 14 tablet 0    mupirocin (BACTROBAN) 2 % ointment Apply topically 3 times daily. 22 g 0    meloxicam (MOBIC) 15 MG tablet Take 1 tablet by mouth daily as needed for Pain 90 tablet 0    RABEprazole (ACIPHEX) 20 MG tablet TAKE 1 TABLET DAILY 90 tablet 3    diazePAM (VALIUM) 10 MG tablet Take

## 2025-06-23 DIAGNOSIS — I10 ESSENTIAL HYPERTENSION: Chronic | ICD-10-CM

## 2025-06-23 RX ORDER — AMLODIPINE BESYLATE 10 MG/1
10 TABLET ORAL NIGHTLY
Qty: 90 TABLET | Refills: 3 | Status: SHIPPED | OUTPATIENT
Start: 2025-06-23

## 2025-06-23 RX ORDER — LOSARTAN POTASSIUM AND HYDROCHLOROTHIAZIDE 25; 100 MG/1; MG/1
1 TABLET ORAL DAILY
Qty: 90 TABLET | Refills: 3 | Status: SHIPPED | OUTPATIENT
Start: 2025-06-23

## 2025-08-05 ENCOUNTER — OFFICE VISIT (OUTPATIENT)
Dept: INTERNAL MEDICINE | Age: 60
End: 2025-08-05
Payer: COMMERCIAL

## 2025-08-05 VITALS
HEIGHT: 76 IN | SYSTOLIC BLOOD PRESSURE: 126 MMHG | BODY MASS INDEX: 20.95 KG/M2 | WEIGHT: 172 LBS | OXYGEN SATURATION: 97 % | HEART RATE: 94 BPM | DIASTOLIC BLOOD PRESSURE: 80 MMHG

## 2025-08-05 DIAGNOSIS — D21.21: ICD-10-CM

## 2025-08-05 DIAGNOSIS — I10 ESSENTIAL HYPERTENSION: Primary | Chronic | ICD-10-CM

## 2025-08-05 DIAGNOSIS — F31.81 BIPOLAR 2 DISORDER (HCC): ICD-10-CM

## 2025-08-05 DIAGNOSIS — M62.89 PELVIC FLOOR DYSFUNCTION: Chronic | ICD-10-CM

## 2025-08-05 PROCEDURE — 3079F DIAST BP 80-89 MM HG: CPT | Performed by: INTERNAL MEDICINE

## 2025-08-05 PROCEDURE — 3074F SYST BP LT 130 MM HG: CPT | Performed by: INTERNAL MEDICINE

## 2025-08-05 PROCEDURE — 99213 OFFICE O/P EST LOW 20 MIN: CPT | Performed by: INTERNAL MEDICINE

## (undated) DEVICE — ENDO KIT,LOURDES HOSPITAL: Brand: MEDLINE INDUSTRIES, INC.